# Patient Record
Sex: FEMALE | Race: ASIAN | NOT HISPANIC OR LATINO | Employment: UNEMPLOYED | URBAN - METROPOLITAN AREA
[De-identification: names, ages, dates, MRNs, and addresses within clinical notes are randomized per-mention and may not be internally consistent; named-entity substitution may affect disease eponyms.]

---

## 2017-10-17 ENCOUNTER — OFFICE VISIT (OUTPATIENT)
Dept: PRIMARY CARE CLINIC | Facility: CLINIC | Age: 72
End: 2017-10-17
Payer: MEDICARE

## 2017-10-17 VITALS
RESPIRATION RATE: 18 BRPM | TEMPERATURE: 98 F | SYSTOLIC BLOOD PRESSURE: 119 MMHG | OXYGEN SATURATION: 95 % | DIASTOLIC BLOOD PRESSURE: 69 MMHG | WEIGHT: 120.63 LBS | HEART RATE: 63 BPM | BODY MASS INDEX: 23.68 KG/M2 | HEIGHT: 60 IN

## 2017-10-17 DIAGNOSIS — J30.89 CHRONIC NON-SEASONAL ALLERGIC RHINITIS, UNSPECIFIED TRIGGER: ICD-10-CM

## 2017-10-17 DIAGNOSIS — A04.8 H. PYLORI INFECTION: ICD-10-CM

## 2017-10-17 DIAGNOSIS — R53.83 FATIGUE, UNSPECIFIED TYPE: Primary | ICD-10-CM

## 2017-10-17 DIAGNOSIS — K21.9 GASTROESOPHAGEAL REFLUX DISEASE, ESOPHAGITIS PRESENCE NOT SPECIFIED: ICD-10-CM

## 2017-10-17 DIAGNOSIS — I10 ESSENTIAL HYPERTENSION: ICD-10-CM

## 2017-10-17 DIAGNOSIS — R10.13 ABDOMINAL PAIN, EPIGASTRIC: ICD-10-CM

## 2017-10-17 PROCEDURE — 99999 PR PBB SHADOW E&M-NEW PATIENT-LVL III: CPT | Mod: PBBFAC,,, | Performed by: INTERNAL MEDICINE

## 2017-10-17 PROCEDURE — 96372 THER/PROPH/DIAG INJ SC/IM: CPT | Mod: S$GLB,,, | Performed by: INTERNAL MEDICINE

## 2017-10-17 PROCEDURE — 99213 OFFICE O/P EST LOW 20 MIN: CPT | Mod: 25,S$GLB,, | Performed by: INTERNAL MEDICINE

## 2017-10-17 PROCEDURE — 99499 UNLISTED E&M SERVICE: CPT | Mod: S$GLB,,, | Performed by: INTERNAL MEDICINE

## 2017-10-17 RX ORDER — LEVOCETIRIZINE DIHYDROCHLORIDE 5 MG/1
5 TABLET, FILM COATED ORAL NIGHTLY
Qty: 90 TABLET | Refills: 3 | Status: SHIPPED | OUTPATIENT
Start: 2017-10-17 | End: 2018-10-11 | Stop reason: SDUPTHER

## 2017-10-17 RX ORDER — CYANOCOBALAMIN 1000 UG/ML
2000 INJECTION, SOLUTION INTRAMUSCULAR; SUBCUTANEOUS
Status: COMPLETED | OUTPATIENT
Start: 2017-10-17 | End: 2017-10-17

## 2017-10-17 RX ORDER — DICLOFENAC SODIUM 50 MG/1
50 TABLET, DELAYED RELEASE ORAL 2 TIMES DAILY
Qty: 180 TABLET | Refills: 3 | Status: CANCELLED | OUTPATIENT
Start: 2017-10-17 | End: 2018-01-15

## 2017-10-17 RX ORDER — ESOMEPRAZOLE MAGNESIUM 40 MG/1
40 CAPSULE, DELAYED RELEASE ORAL
Qty: 90 CAPSULE | Refills: 3 | Status: SHIPPED | OUTPATIENT
Start: 2017-10-17 | End: 2018-10-11 | Stop reason: SDUPTHER

## 2017-10-17 RX ORDER — METOPROLOL SUCCINATE 25 MG/1
25 TABLET, EXTENDED RELEASE ORAL DAILY
COMMUNITY
End: 2018-10-11 | Stop reason: SDUPTHER

## 2017-10-17 RX ORDER — ESOMEPRAZOLE MAGNESIUM 40 MG/1
40 CAPSULE, DELAYED RELEASE ORAL
COMMUNITY
End: 2017-10-17 | Stop reason: SDUPTHER

## 2017-10-17 RX ORDER — LEVOCETIRIZINE DIHYDROCHLORIDE 5 MG/1
5 TABLET, FILM COATED ORAL NIGHTLY
COMMUNITY
End: 2017-10-17 | Stop reason: SDUPTHER

## 2017-10-17 RX ORDER — DICLOFENAC SODIUM 50 MG/1
50 TABLET, DELAYED RELEASE ORAL 2 TIMES DAILY
COMMUNITY
End: 2019-05-28 | Stop reason: ALTCHOICE

## 2017-10-17 RX ADMIN — CYANOCOBALAMIN 2000 MCG: 1000 INJECTION, SOLUTION INTRAMUSCULAR; SUBCUTANEOUS at 04:10

## 2017-10-17 NOTE — PROGRESS NOTES
B12 2,000mg IM given left gluteal, tolerated well.  No adverse reaction noted.  Voiced no complaints.

## 2017-10-18 NOTE — PROGRESS NOTES
Subjective:       Patient ID: Louis Bronson is a 72 y.o. female.    Chief Complaint: Annual Exam and Allergies    HPI  Pt was admitted to [facility] for c/o epigastric pain had cardiac w/u with Dr Davenport normal and EGD ops Hpylory as per pt no Mrs from [facility] still with epigastric pain not as bad had po abx and on PPI c/o tired anxiety  Review of Systems    Objective:      Physical Exam   Constitutional: She is oriented to person, place, and time. She appears well-developed and well-nourished. No distress.   HENT:   Head: Normocephalic and atraumatic.   Right Ear: External ear normal.   Left Ear: External ear normal.   Nose: Nose normal.   Mouth/Throat: Oropharynx is clear and moist. No oropharyngeal exudate.   Eyes: Conjunctivae and EOM are normal. Pupils are equal, round, and reactive to light. Right eye exhibits no discharge. Left eye exhibits no discharge.   Neck: Normal range of motion. Neck supple. No thyromegaly present.   Cardiovascular: Normal rate, regular rhythm, normal heart sounds and intact distal pulses.  Exam reveals no gallop and no friction rub.    No murmur heard.  Pulmonary/Chest: Effort normal and breath sounds normal. No respiratory distress. She has no wheezes. She has no rales. She exhibits no tenderness.   Abdominal: Soft. Bowel sounds are normal. She exhibits no distension. There is no tenderness. There is no rebound and no guarding.   Musculoskeletal: Normal range of motion. She exhibits no edema, tenderness or deformity.   Lymphadenopathy:     She has no cervical adenopathy.   Neurological: She is alert and oriented to person, place, and time.   Skin: Skin is warm and dry. Capillary refill takes less than 2 seconds. No rash noted. No erythema.   Psychiatric: She has a normal mood and affect. Judgment and thought content normal.   Nursing note and vitals reviewed.      Assessment:       1. Fatigue, unspecified type    2. Abdominal pain, epigastric    3. Gastroesophageal reflux disease,  esophagitis presence not specified    4. H. pylori infection    5. Chronic non-seasonal allergic rhinitis, unspecified trigger        Plan:       Fatigue, unspecified type  -     cyanocobalamin injection 2,000 mcg; Inject 2 mLs (2,000 mcg total) into the muscle one time.    Abdominal pain, epigastric  Comments:  get MRs from [facility]    Gastroesophageal reflux disease, esophagitis presence not specified  -     esomeprazole (NEXIUM) 40 MG capsule; Take 1 capsule (40 mg total) by mouth before breakfast.  Dispense: 90 capsule; Refill: 3    H. pylori infection    Chronic non-seasonal allergic rhinitis, unspecified trigger  -     levocetirizine (XYZAL) 5 MG tablet; Take 1 tablet (5 mg total) by mouth every evening.  Dispense: 90 tablet; Refill: 3    Other orders  -     Cancel: diclofenac (VOLTAREN) 50 MG EC tablet; Take 1 tablet (50 mg total) by mouth 2 (two) times daily.  Dispense: 180 tablet; Refill: 3

## 2017-10-24 ENCOUNTER — CLINICAL SUPPORT (OUTPATIENT)
Dept: PRIMARY CARE CLINIC | Facility: CLINIC | Age: 72
End: 2017-10-24
Payer: MEDICARE

## 2017-10-24 ENCOUNTER — TELEPHONE (OUTPATIENT)
Dept: PRIMARY CARE CLINIC | Facility: CLINIC | Age: 72
End: 2017-10-24

## 2017-10-24 DIAGNOSIS — Z00.00 ROUTINE GENERAL MEDICAL EXAMINATION AT A HEALTH CARE FACILITY: Primary | ICD-10-CM

## 2017-10-24 DIAGNOSIS — I10 BENIGN HYPERTENSION: ICD-10-CM

## 2017-10-24 DIAGNOSIS — E78.5 HYPERLIPIDEMIA, UNSPECIFIED HYPERLIPIDEMIA TYPE: ICD-10-CM

## 2017-10-24 LAB
ALBUMIN SERPL BCP-MCNC: 3.5 G/DL
ALP SERPL-CCNC: 93 U/L
ALT SERPL W/O P-5'-P-CCNC: 23 U/L
ANION GAP SERPL CALC-SCNC: 7 MMOL/L
AST SERPL-CCNC: 26 U/L
BASOPHILS # BLD AUTO: 0.02 K/UL
BASOPHILS NFR BLD: 0.4 %
BILIRUB SERPL-MCNC: 0.4 MG/DL
BUN SERPL-MCNC: 22 MG/DL
CALCIUM SERPL-MCNC: 9.3 MG/DL
CHLORIDE SERPL-SCNC: 105 MMOL/L
CHOLEST SERPL-MCNC: 190 MG/DL
CHOLEST/HDLC SERPL: 3.3 {RATIO}
CO2 SERPL-SCNC: 28 MMOL/L
CREAT SERPL-MCNC: 0.8 MG/DL
DIFFERENTIAL METHOD: NORMAL
EOSINOPHIL # BLD AUTO: 0.1 K/UL
EOSINOPHIL NFR BLD: 2.3 %
ERYTHROCYTE [DISTWIDTH] IN BLOOD BY AUTOMATED COUNT: 13.9 %
EST. GFR  (AFRICAN AMERICAN): >60 ML/MIN/1.73 M^2
EST. GFR  (NON AFRICAN AMERICAN): >60 ML/MIN/1.73 M^2
GLUCOSE SERPL-MCNC: 95 MG/DL
HCT VFR BLD AUTO: 38.9 %
HDLC SERPL-MCNC: 58 MG/DL
HDLC SERPL: 30.5 %
HGB BLD-MCNC: 12.6 G/DL
IMM GRANULOCYTES # BLD AUTO: 0.01 K/UL
IMM GRANULOCYTES NFR BLD AUTO: 0.2 %
LDLC SERPL CALC-MCNC: 107.6 MG/DL
LYMPHOCYTES # BLD AUTO: 1.9 K/UL
LYMPHOCYTES NFR BLD: 36.3 %
MCH RBC QN AUTO: 30.9 PG
MCHC RBC AUTO-ENTMCNC: 32.4 G/DL
MCV RBC AUTO: 95 FL
MONOCYTES # BLD AUTO: 0.4 K/UL
MONOCYTES NFR BLD: 7.9 %
NEUTROPHILS # BLD AUTO: 2.8 K/UL
NEUTROPHILS NFR BLD: 52.9 %
NONHDLC SERPL-MCNC: 132 MG/DL
NRBC BLD-RTO: 0 /100 WBC
PLATELET # BLD AUTO: 259 K/UL
PMV BLD AUTO: 9.8 FL
POTASSIUM SERPL-SCNC: 4.5 MMOL/L
PROT SERPL-MCNC: 7 G/DL
RBC # BLD AUTO: 4.08 M/UL
SODIUM SERPL-SCNC: 140 MMOL/L
TRIGL SERPL-MCNC: 122 MG/DL
TSH SERPL DL<=0.005 MIU/L-ACNC: 2.19 UIU/ML
WBC # BLD AUTO: 5.2 K/UL

## 2017-10-24 PROCEDURE — 84443 ASSAY THYROID STIM HORMONE: CPT

## 2017-10-24 PROCEDURE — 99999 PR PBB SHADOW E&M-EST. PATIENT-LVL II: CPT | Mod: PBBFAC,,,

## 2017-10-24 PROCEDURE — 85025 COMPLETE CBC W/AUTO DIFF WBC: CPT

## 2017-10-24 PROCEDURE — 80053 COMPREHEN METABOLIC PANEL: CPT

## 2017-10-24 PROCEDURE — 80061 LIPID PANEL: CPT

## 2017-11-28 RX ORDER — INSULIN PUMP SYRINGE, 3 ML
EACH MISCELLANEOUS
Qty: 1 EACH | Refills: 0 | Status: SHIPPED | OUTPATIENT
Start: 2017-11-28 | End: 2019-05-14

## 2017-12-29 ENCOUNTER — TELEPHONE (OUTPATIENT)
Dept: PRIMARY CARE CLINIC | Facility: CLINIC | Age: 72
End: 2017-12-29

## 2017-12-29 NOTE — TELEPHONE ENCOUNTER
----- Message from Bibi King sent at 12/29/2017  1:47 PM CST -----  Contact: Prisca with [facility]   Prisca with [facility] want to speak with a nurse regarding patient test strip machine and lancets. She faxed over papers and needs the diagnostic code and the dr signature. Please call back at [phone]

## 2017-12-29 NOTE — TELEPHONE ENCOUNTER
Spoke with pharmacy  And requested that they re-fax the paper that needs to have  A dx and physician signature. She stated she will fax it again right now . I explained that as soon as I receive it I will give it to Dr. Lerma to do and I will fax it. Back.

## 2017-12-29 NOTE — TELEPHONE ENCOUNTER
----- Message from Bibi King sent at 12/29/2017  1:47 PM CST -----  Contact: Prisca with [facility]   Prisca with [facility] want to speak with a nurse regarding patient test strip machine and lancets. She faxed over papers and needs the diagnostic code and the dr signature. Please call back at [phone]

## 2018-04-24 ENCOUNTER — TELEPHONE (OUTPATIENT)
Dept: OBSTETRICS AND GYNECOLOGY | Facility: CLINIC | Age: 73
End: 2018-04-24

## 2018-04-24 NOTE — TELEPHONE ENCOUNTER
"FYI - New pt - pt's son called to schedule her appt because she is really uncomfortable "down there". pts daughter will be coming to the appt with her to translate.   "

## 2018-04-30 ENCOUNTER — OFFICE VISIT (OUTPATIENT)
Dept: OBSTETRICS AND GYNECOLOGY | Facility: CLINIC | Age: 73
End: 2018-04-30
Attending: STUDENT IN AN ORGANIZED HEALTH CARE EDUCATION/TRAINING PROGRAM
Payer: MEDICARE

## 2018-04-30 VITALS
SYSTOLIC BLOOD PRESSURE: 128 MMHG | BODY MASS INDEX: 23.94 KG/M2 | DIASTOLIC BLOOD PRESSURE: 64 MMHG | WEIGHT: 121.94 LBS | HEIGHT: 60 IN

## 2018-04-30 DIAGNOSIS — Z78.0 POSTMENOPAUSAL: ICD-10-CM

## 2018-04-30 DIAGNOSIS — Z12.4 ENCOUNTER FOR PAPANICOLAOU SMEAR FOR CERVICAL CANCER SCREENING: ICD-10-CM

## 2018-04-30 DIAGNOSIS — Z12.39 BREAST CANCER SCREENING: ICD-10-CM

## 2018-04-30 DIAGNOSIS — Z11.51 ENCOUNTER FOR SCREENING FOR HUMAN PAPILLOMAVIRUS (HPV): ICD-10-CM

## 2018-04-30 DIAGNOSIS — Z01.419 WELL WOMAN EXAM: Primary | ICD-10-CM

## 2018-04-30 DIAGNOSIS — R30.0 DYSURIA: ICD-10-CM

## 2018-04-30 PROCEDURE — 87086 URINE CULTURE/COLONY COUNT: CPT

## 2018-04-30 PROCEDURE — 87624 HPV HI-RISK TYP POOLED RSLT: CPT

## 2018-04-30 PROCEDURE — G0101 CA SCREEN;PELVIC/BREAST EXAM: HCPCS | Mod: S$GLB,,, | Performed by: STUDENT IN AN ORGANIZED HEALTH CARE EDUCATION/TRAINING PROGRAM

## 2018-04-30 PROCEDURE — 87510 GARDNER VAG DNA DIR PROBE: CPT

## 2018-04-30 PROCEDURE — 99999 PR PBB SHADOW E&M-EST. PATIENT-LVL IV: CPT | Mod: PBBFAC,,, | Performed by: STUDENT IN AN ORGANIZED HEALTH CARE EDUCATION/TRAINING PROGRAM

## 2018-04-30 PROCEDURE — 88175 CYTOPATH C/V AUTO FLUID REDO: CPT

## 2018-04-30 PROCEDURE — 87480 CANDIDA DNA DIR PROBE: CPT

## 2018-04-30 RX ORDER — FLUCONAZOLE 150 MG/1
150 TABLET ORAL ONCE
Qty: 2 TABLET | Refills: 1 | Status: SHIPPED | OUTPATIENT
Start: 2018-04-30 | End: 2018-04-30

## 2018-04-30 RX ORDER — NYSTATIN AND TRIAMCINOLONE ACETONIDE 100000; 1 [USP'U]/G; MG/G
CREAM TOPICAL
Qty: 30 G | Refills: 1 | Status: SHIPPED | OUTPATIENT
Start: 2018-04-30 | End: 2019-05-14

## 2018-04-30 NOTE — PROGRESS NOTES
Chief Complaint: Vaginal Irritation     HPI:      Louis Bronson is a 72 y.o.  who presents today for evaluation of vaginal irritation and burning.  She reports her symptoms have improved over the past month.  She reports itching and redness.  She denies any blood in her urine.  Denies any vaginal bleeding or hetmatochezia.  She also reports some clumpy white discharge.  LMP: No LMP recorded (lmp unknown). Patient is postmenopausal.  No other issues, problems, or complaints.  Specifically, patient denies postmenopausal vaginal bleeding, discharge, pelvic pain, urinary problems, or changes in appetite. Ms. Bronson is not currently sexually active.     Previous Pap: No result found   Previous Mammogram: 7 years ago per patient  Most Recent Dexa: 8 years ago per patient  Colonoscopy: 18 Normal    GYN Hx:  Menarche 14.  Menopause 50-50 yo. No postmenopausal bleeding.   OB History      Para Term  AB Living    13 13 13     12    SAB TAB Ectopic Multiple Live Births            12        Past Medical History:   Diagnosis Date    Hyperlipidemia     Hypertension      History reviewed. No pertinent surgical history.  Social History     Social History    Marital status:      Spouse name: N/A    Number of children: N/A    Years of education: N/A     Occupational History    Not on file.     Social History Main Topics    Smoking status: Never Smoker    Smokeless tobacco: Never Used    Alcohol use No    Drug use: No    Sexual activity: Not on file     Other Topics Concern    Not on file     Social History Narrative    No narrative on file     Family History   Problem Relation Age of Onset    Breast cancer Neg Hx     Colon cancer Neg Hx     Ovarian cancer Neg Hx        ROS:     GENERAL: Denies unintentional weight gain or weight loss. Feeling well overall.   SKIN: Denies rash or lesions.   HEENT: Denies headaches, or vision changes.   CARDIOVASCULAR: Denies palpitations or chest  pain.   RESPIRATORY: Denies shortness of breath or dyspnea on exertion.  BREASTS: Denies pain, lumps, or nipple discharge.   ABDOMEN: Denies abdominal pain, constipation, diarrhea, nausea, vomiting, change in appetite.  URINARY: Denies frequency, dysuria, hematuria.  NEUROLOGIC: Denies syncope or weakness.   PSYCHIATRIC: Denies depression, anxiety or mood swings.    Physical Exam:      PHYSICAL EXAM:  /64   Ht 5' (1.524 m)   Wt 55.3 kg (121 lb 14.6 oz)   LMP  (LMP Unknown)   BMI 23.81 kg/m²   Body mass index is 23.81 kg/m².     APPEARANCE: Well nourished, well developed, in no acute distress.  PSYCH: Appropriate mood and affect.  SKIN: No acne or hirsutism.  NECK: Neck symmetric without masses or thyromegaly.  NODES: No inguinal, axillary, or supraclavicular lymph node enlargement.  CHEST: Normal respiratory effort.    CARDIOVASCULAR:  Regular rate and rhythm.  LUNGS:  Clear to auscultation bilaterally.  BREASTS: Symmetrical, no skin changes or visible lesions.  No palpable masses or nipple discharge bilaterally.  ABDOMEN: Soft.  No tenderness or masses.    PELVIC: Normal external genitalia without lesions.  Bilateral labia majora erythematous.  Normal hair distribution.  Adequate perineal body, normal urethral meatus.  Vagina moist and well rugated without lesions or discharge.  Cervix pink, without lesions, discharge or tenderness.  No significant cystocele or rectocele.  Bimanual exam shows uterus to be normal size, regular, mobile and nontender.  Adnexa without masses or tenderness.    EXTREMITIES: No edema.  No tenderness to palpation.    Labs:  Urine dip negative    Assessment/Plan:     72 y.o.     Postmenopausal  -     DXA Bone Density Spine And Hip; Future; Expected date: 2018    Breast cancer screening  -     Mammo Digital Screening Bilat with Tomosynthesis with CAD; Future; Expected date: 2018  -     DXA Bone Density Spine And Hip; Future; Expected date:  2018    Dysuria  -     Urine culture    Other orders  -     fluconazole (DIFLUCAN) 150 MG Tab; Take 1 tablet (150 mg total) by mouth once. Take one tablet. If symptoms persist, repeat dose in 3 days.  Dispense: 2 tablet; Refill: 1  -     nystatin-triamcinolone (MYCOLOG II) cream; Apply to affected area 2 times daily  Dispense: 30 g; Refill: 1          Counselin.  Annual exam performed today without difficulty.  Patient was counseled today on current ASCCP pap guidelines, the recommendation for yearly pelvic exams, healthy diet and exercise routines, annual mammograms.  Pap smear collected and will follow up results.  Dexa ordered.  All questions answered.    2.  Vulvovaginal candidiasis:  Diflucan and mycolog cream given.  Urine culture also sent.  RTC in 2 weeks for follow up.    3.  Follow up with PCP for other health maintenance.  4.  RTC in 2 weeks or sooner if needed.    Use of the [facility] Patient Portal discussed and encouraged during today's visit.

## 2018-05-01 LAB
BACTERIA UR CULT: NORMAL
CANDIDA RRNA VAG QL PROBE: NEGATIVE
G VAGINALIS RRNA GENITAL QL PROBE: NEGATIVE
T VAGINALIS RRNA GENITAL QL PROBE: NEGATIVE

## 2018-05-04 ENCOUNTER — PATIENT MESSAGE (OUTPATIENT)
Dept: OBSTETRICS AND GYNECOLOGY | Facility: CLINIC | Age: 73
End: 2018-05-04

## 2018-05-04 LAB
HPV16 AG SPEC QL: NEGATIVE
HPV16+18+H RISK 12 DNA CVX-IMP: NEGATIVE
HPV18 DNA SPEC QL NAA+PROBE: NEGATIVE

## 2018-05-14 ENCOUNTER — OFFICE VISIT (OUTPATIENT)
Dept: OBSTETRICS AND GYNECOLOGY | Facility: CLINIC | Age: 73
End: 2018-05-14
Attending: STUDENT IN AN ORGANIZED HEALTH CARE EDUCATION/TRAINING PROGRAM
Payer: MEDICARE

## 2018-05-14 VITALS
WEIGHT: 121.06 LBS | DIASTOLIC BLOOD PRESSURE: 72 MMHG | BODY MASS INDEX: 23.77 KG/M2 | SYSTOLIC BLOOD PRESSURE: 128 MMHG | HEIGHT: 60 IN

## 2018-05-14 DIAGNOSIS — N95.2 VAGINAL ATROPHY: Primary | ICD-10-CM

## 2018-05-14 DIAGNOSIS — N39.42 URINARY INCONTINENCE WITHOUT SENSORY AWARENESS: ICD-10-CM

## 2018-05-14 LAB
BILIRUB UR QL STRIP: NEGATIVE
CLARITY UR REFRACT.AUTO: CLEAR
COLOR UR AUTO: COLORLESS
GLUCOSE UR QL STRIP: NEGATIVE
HGB UR QL STRIP: NEGATIVE
KETONES UR QL STRIP: NEGATIVE
LEUKOCYTE ESTERASE UR QL STRIP: NEGATIVE
NITRITE UR QL STRIP: NEGATIVE
PH UR STRIP: 7 [PH] (ref 5–8)
PROT UR QL STRIP: NEGATIVE
SP GR UR STRIP: 1 (ref 1–1.03)
URN SPEC COLLECT METH UR: ABNORMAL
UROBILINOGEN UR STRIP-ACNC: NEGATIVE EU/DL

## 2018-05-14 PROCEDURE — 99213 OFFICE O/P EST LOW 20 MIN: CPT | Mod: S$GLB,,, | Performed by: STUDENT IN AN ORGANIZED HEALTH CARE EDUCATION/TRAINING PROGRAM

## 2018-05-14 PROCEDURE — 3078F DIAST BP <80 MM HG: CPT | Mod: CPTII,S$GLB,, | Performed by: STUDENT IN AN ORGANIZED HEALTH CARE EDUCATION/TRAINING PROGRAM

## 2018-05-14 PROCEDURE — 87086 URINE CULTURE/COLONY COUNT: CPT

## 2018-05-14 PROCEDURE — 3074F SYST BP LT 130 MM HG: CPT | Mod: CPTII,S$GLB,, | Performed by: STUDENT IN AN ORGANIZED HEALTH CARE EDUCATION/TRAINING PROGRAM

## 2018-05-14 PROCEDURE — 99999 PR PBB SHADOW E&M-EST. PATIENT-LVL III: CPT | Mod: PBBFAC,,, | Performed by: STUDENT IN AN ORGANIZED HEALTH CARE EDUCATION/TRAINING PROGRAM

## 2018-05-14 PROCEDURE — 81003 URINALYSIS AUTO W/O SCOPE: CPT

## 2018-05-14 RX ORDER — ESTRADIOL 0.1 MG/G
CREAM VAGINAL
Qty: 42.5 G | Refills: 1 | Status: SHIPPED | OUTPATIENT
Start: 2018-05-14 | End: 2018-12-17 | Stop reason: SDUPTHER

## 2018-05-14 NOTE — PROGRESS NOTES
Chief Complaint: Vaginal atrophy follow up     HPI:      Louis Bronson is a 73 y.o.  who presents today for follow up.  Patient reports that her symptoms improved with the mycolog and diflucan.  She does report that over the weekend she traveled to [place] for a  and started to notice the symptoms again.  She denies any other issues or concern.  No vaginal bleeding, no hematuria.      OB History      Para Term  AB Living    13 13 13     12    SAB TAB Ectopic Multiple Live Births            12        Past Medical History:   Diagnosis Date    Hyperlipidemia     Hypertension      History reviewed. No pertinent surgical history.  Social History     Social History    Marital status:      Spouse name: N/A    Number of children: N/A    Years of education: N/A     Occupational History    Not on file.     Social History Main Topics    Smoking status: Never Smoker    Smokeless tobacco: Never Used    Alcohol use No    Drug use: No    Sexual activity: Not on file     Other Topics Concern    Not on file     Social History Narrative    No narrative on file     Family History   Problem Relation Age of Onset    Breast cancer Neg Hx     Colon cancer Neg Hx     Ovarian cancer Neg Hx        ROS:     GENERAL: Denies unintentional weight gain or weight loss. Feeling well overall.   SKIN: Denies rash or lesions.   HEENT: Denies headaches, or vision changes.   CARDIOVASCULAR: Denies palpitations or chest pain.   RESPIRATORY: Denies shortness of breath or dyspnea on exertion.  BREASTS: Denies pain, lumps, or nipple discharge.   ABDOMEN: Denies abdominal pain, constipation, diarrhea, nausea, vomiting, change in appetite.  URINARY: Denies frequency, dysuria, hematuria.  NEUROLOGIC: Denies syncope or weakness.   PSYCHIATRIC: Denies depression, anxiety or mood swings.    Physical Exam:      PHYSICAL EXAM:  /72   Ht 5' (1.524 m)   Wt 54.9 kg (121 lb 0.5 oz)   LMP  (LMP Unknown)    BMI 23.64 kg/m²   Body mass index is 23.64 kg/m².     APPEARANCE: Well nourished, well developed, in no acute distress.  PSYCH: Appropriate mood and affect.  SKIN: No acne or hirsutism.  ABDOMEN: Soft.  No tenderness or masses.    PELVIC: Normal external genitalia without lesions.  Normal hair distribution.  Adequate perineal body.  Vagina atrophic and urethral meatus inflamed and erythematous. No vaginal discharge present.  Adnexa without masses or tenderness.    EXTREMITIES: No edema.  No tenderness to palpation.    Labs:  Urine dip negative    Assessment/Plan:     73 y.o.     Vaginal atrophy  -     Ambulatory Referral to Urogynecology  -     Urine culture  -     Urinalysis    Urinary incontinence without sensory awareness   -     Urine culture    Other orders  -     estradiol (ESTRACE) 0.01 % (0.1 mg/gram) vaginal cream; Apply a dime sized about nightly x 2 weeks, then 3 times per week x 2 weeks, then twice weekly x 2 weeks, then weekly.  Dispense: 42.5 g; Refill: 1          Counselin.  Vaginal atrophy:  Will start vaginal estrogen.  R/B/A of medication reviewed with patient and daughter.  Instructed on use.  All questions answered.  RTC in 6 weeks for follow up.  2.  Dysuria:  Urine culture again collected.  Inflammation present around urethral meatus.  Will follow up results and refer to Urogyn.  All questions answered.  3.  Follow up with PCP for other health maintenance.  4.  RTC in 6 weeks or sooner if needed.    Use of the [facility] Patient Portal discussed and encouraged during today's visit.

## 2018-05-15 ENCOUNTER — APPOINTMENT (OUTPATIENT)
Dept: RADIOLOGY | Facility: CLINIC | Age: 73
End: 2018-05-15
Attending: STUDENT IN AN ORGANIZED HEALTH CARE EDUCATION/TRAINING PROGRAM
Payer: MEDICARE

## 2018-05-15 DIAGNOSIS — Z12.39 BREAST CANCER SCREENING: ICD-10-CM

## 2018-05-15 DIAGNOSIS — Z78.0 POSTMENOPAUSAL: ICD-10-CM

## 2018-05-15 LAB — BACTERIA UR CULT: NO GROWTH

## 2018-05-15 PROCEDURE — 77080 DXA BONE DENSITY AXIAL: CPT | Mod: TC,PO

## 2018-05-15 PROCEDURE — 77080 DXA BONE DENSITY AXIAL: CPT | Mod: 26,,, | Performed by: INTERNAL MEDICINE

## 2018-05-23 ENCOUNTER — PATIENT MESSAGE (OUTPATIENT)
Dept: OBSTETRICS AND GYNECOLOGY | Facility: CLINIC | Age: 73
End: 2018-05-23

## 2018-05-23 NOTE — TELEPHONE ENCOUNTER
Phone call made to patient regarding bone density results.  No answer.  Voicemail left to return MD phone call.

## 2018-06-25 ENCOUNTER — OFFICE VISIT (OUTPATIENT)
Dept: OBSTETRICS AND GYNECOLOGY | Facility: CLINIC | Age: 73
End: 2018-06-25
Attending: STUDENT IN AN ORGANIZED HEALTH CARE EDUCATION/TRAINING PROGRAM
Payer: MEDICARE

## 2018-06-25 VITALS
BODY MASS INDEX: 23.74 KG/M2 | WEIGHT: 120.94 LBS | SYSTOLIC BLOOD PRESSURE: 126 MMHG | DIASTOLIC BLOOD PRESSURE: 80 MMHG | HEIGHT: 60 IN

## 2018-06-25 DIAGNOSIS — N95.2 VAGINAL ATROPHY: Primary | ICD-10-CM

## 2018-06-25 PROCEDURE — 3074F SYST BP LT 130 MM HG: CPT | Mod: CPTII,S$GLB,, | Performed by: STUDENT IN AN ORGANIZED HEALTH CARE EDUCATION/TRAINING PROGRAM

## 2018-06-25 PROCEDURE — 99999 PR PBB SHADOW E&M-EST. PATIENT-LVL III: CPT | Mod: PBBFAC,,, | Performed by: STUDENT IN AN ORGANIZED HEALTH CARE EDUCATION/TRAINING PROGRAM

## 2018-06-25 PROCEDURE — 3079F DIAST BP 80-89 MM HG: CPT | Mod: CPTII,S$GLB,, | Performed by: STUDENT IN AN ORGANIZED HEALTH CARE EDUCATION/TRAINING PROGRAM

## 2018-06-25 PROCEDURE — 99213 OFFICE O/P EST LOW 20 MIN: CPT | Mod: S$GLB,,, | Performed by: STUDENT IN AN ORGANIZED HEALTH CARE EDUCATION/TRAINING PROGRAM

## 2018-06-25 NOTE — PROGRESS NOTES
Chief Complaint: Vaginal atrophy     HPI:      Louis Bronson is a 73 y.o.  who presents today for follow up of vaginal atrophy and irritation.  Reports improvement with mycolog cream and estrogen cream.  Denies any issues or concerns.  No vaginal discharge or bleeding.  Reoprts she is now using estradiol cream once weekly.      OB History      Para Term  AB Living    13 13 13     12    SAB TAB Ectopic Multiple Live Births            12        Past Medical History:   Diagnosis Date    Hyperlipidemia     Hypertension      History reviewed. No pertinent surgical history.  Social History     Social History    Marital status:      Spouse name: N/A    Number of children: N/A    Years of education: N/A     Occupational History    Not on file.     Social History Main Topics    Smoking status: Never Smoker    Smokeless tobacco: Never Used    Alcohol use No    Drug use: No    Sexual activity: Not on file     Other Topics Concern    Not on file     Social History Narrative    No narrative on file     Family History   Problem Relation Age of Onset    Breast cancer Neg Hx     Colon cancer Neg Hx     Ovarian cancer Neg Hx        ROS:     GENERAL: Denies unintentional weight gain or weight loss. Feeling well overall.   SKIN: Denies rash or lesions.   HEENT: Denies headaches, or vision changes.   ABDOMEN: Denies abdominal pain, constipation, diarrhea, nausea, vomiting, change in appetite.  URINARY: Denies frequency, dysuria, hematuria.  NEUROLOGIC: Denies syncope or weakness.   PSYCHIATRIC: Denies depression, anxiety or mood swings.    Physical Exam:      PHYSICAL EXAM:  /80   Ht 5' (1.524 m)   Wt 54.9 kg (120 lb 14.8 oz)   LMP  (LMP Unknown)   BMI 23.62 kg/m²   Body mass index is 23.62 kg/m².     APPEARANCE: Well nourished, well developed, in no acute distress.  PSYCH: Appropriate mood and affect.  SKIN: No acne or hirsutism.  ABDOMEN: Soft.  No tenderness or masses.     PELVIC: Normal external genitalia without lesions.  Normal hair distribution.  Adequate perineal body, normal urethral meatus - remains inflamed and erythematous although some improvement.  Atrophy still present but improved.  Cervix pink, without lesions, discharge or tenderness.  No significant cystocele or rectocele.  Bimanual exam shows uterus to be normal size, regular, mobile and nontender.  Adnexa without masses or tenderness.    EXTREMITIES: No edema.  No tenderness to palpation.    Assessment/Plan:     73 y.o.     Vaginal atrophy          Counselin.  Vaginal atrophy:  Improved with estradiol - will continue current treatment.  Some continued erythema and irritation around urethral meatus.  Will send to Urogynecology for further evaluation.  Discussed with patient and daughter and all questions answered.      Use of the [facility] Patient Portal discussed and encouraged during today's visit.

## 2018-07-17 ENCOUNTER — INITIAL CONSULT (OUTPATIENT)
Dept: UROGYNECOLOGY | Facility: CLINIC | Age: 73
End: 2018-07-17
Attending: STUDENT IN AN ORGANIZED HEALTH CARE EDUCATION/TRAINING PROGRAM
Payer: MEDICARE

## 2018-07-17 VITALS
SYSTOLIC BLOOD PRESSURE: 92 MMHG | BODY MASS INDEX: 23.67 KG/M2 | WEIGHT: 120.56 LBS | HEIGHT: 60 IN | DIASTOLIC BLOOD PRESSURE: 60 MMHG

## 2018-07-17 DIAGNOSIS — K59.01 SLOW TRANSIT CONSTIPATION: ICD-10-CM

## 2018-07-17 DIAGNOSIS — R35.0 URINARY FREQUENCY: ICD-10-CM

## 2018-07-17 DIAGNOSIS — N95.2 VAGINAL ATROPHY: ICD-10-CM

## 2018-07-17 DIAGNOSIS — N36.2 URETHRAL CARUNCLE: Primary | ICD-10-CM

## 2018-07-17 PROCEDURE — 87086 URINE CULTURE/COLONY COUNT: CPT

## 2018-07-17 PROCEDURE — 3074F SYST BP LT 130 MM HG: CPT | Mod: CPTII,S$GLB,, | Performed by: OBSTETRICS & GYNECOLOGY

## 2018-07-17 PROCEDURE — 99214 OFFICE O/P EST MOD 30 MIN: CPT | Mod: S$GLB,,, | Performed by: OBSTETRICS & GYNECOLOGY

## 2018-07-17 PROCEDURE — 99999 PR PBB SHADOW E&M-EST. PATIENT-LVL III: CPT | Mod: PBBFAC,,, | Performed by: OBSTETRICS & GYNECOLOGY

## 2018-07-17 PROCEDURE — 3078F DIAST BP <80 MM HG: CPT | Mod: CPTII,S$GLB,, | Performed by: OBSTETRICS & GYNECOLOGY

## 2018-07-17 RX ORDER — DOCUSATE SODIUM 100 MG/1
100 CAPSULE, LIQUID FILLED ORAL 2 TIMES DAILY
Qty: 30 CAPSULE | Refills: 11 | Status: SHIPPED | OUTPATIENT
Start: 2018-07-17 | End: 2018-08-16

## 2018-07-17 NOTE — LETTER
July 17, 2018      Ngoc Irene MD  [address]  [address]  [facility] [address]           [facility] [address] [place] [address]  Phone: [phone]          Patient: Louis Bronson   MR Number: [id number]   YOB: 1945   Date of Visit: 7/17/2018       Dear Dr. Ngoc Irene:    Thank you for referring Louis Bronson to me for evaluation. Attached you will find relevant portions of my assessment and plan of care.    If you have questions, please do not hesitate to call me. I look forward to following Louis Bronson along with you.    Sincerely,    Batsheva Barragan,     Enclosure  CC:  No Recipients    If you would like to receive this communication electronically, please contact [email or web address] or [phone] to request more information on [facility] [employer] access.    For providers and/or their staff who would like to refer a patient to [facility], please contact us through our one-stop-shop provider referral line, [facility] , at [phone].    If you feel you have received this communication in error or would no longer like to receive these types of communications, please e-mail [email or web address]

## 2018-07-17 NOTE — PATIENT INSTRUCTIONS
1 Vaginal atrophy (dryness):  Urethral caruncle: Use 1 gram of estrogen cream in vagina nightly x 2 weeks, then twice a week thereafter.      2.  Osteopenia  Recommend evaluation with endocrine     3. Constipation:  Controlling constipation may help bladder urgency/leakage and fiber may better control cholesterol and blood glucose.  Start daily fiber.  Take 1 tsp of fiber powder (psyllium or other sugar-free powder).  Mix in 8 oz of water.  Take x 3-5 days.  Then, increase fiber by 1 tsp every 3-5 days until stool is easy to pass.  Stop and continue at that dose.   Do not exceed 6 tsps/day.  Start stool  Daily

## 2018-07-17 NOTE — PROGRESS NOTES
Subjective:       Patient ID: Louis Bronson is a 73 y.o. female.    Chief Complaint:  vaginal atrophy and vaginal lesion      History of Present Illness  HPI 73 Y O F with  has a past medical history of Hyperlipidemia and Hypertension. referred by Dr. Coates for evaluation of vaginal atrophy and a ? Vaginal lesion noted on exam.     [facility]     Question 7/17/2018 11:25 AM CDT   General Urogynecology: Are you experiencing the following?    Dysuria (painful urination) No   Nocturia:  waking up at night to empty your bladder  Yes   If you answered yes to the previous question, how many times does this happen per night? 1-2   Enuresis (urine loss during sleep) No   Dribbling urine after you urinate No   Hematuria (urine appears red) No   Type of stream Strong   Urinary frequency: How often a day are you going to the bathroom per day?  Less than 10   Urinary Tract Infections: How many Urinary Tract Infections have you had in the past year? One (1) in the past year   If you have had a UTI in the past year, what treatments have you had so far?  Other   Urinary Incontinence (General): Are you experiencing the following?    Past consultation for incontinence: Have you ever seen someone for the evaluation of incontinence? No   If you answered yes to the previous question, please select all the therapies you have tried.  None of the above   Please note the effectiveness of the therapies.    Need to wear protection to keep clothes dry  No   If you answered yes to the previous question, please emiliano the protection you use.  None   If you wear protection, how much wetness is typically on each pad? N/a- I do not wear protection   If you wear protection, how often do you have to change per day, if applicable?  0   Stress Symptoms: Are you experiencing the following?    Leakage of urine with cough, laugh and/or sneeze No   If you answered yes to the previous question, what is the frequency in days, weeks and/or months?  "Never   Leakage of urine with sex No   Leakage of urine with bending/ lifting No   Leakage of urine with briskly walking or jogging No   If you lose urine for any other reason not previously mentioned, please note it below, if applicable.     Urge Symptoms: Are you experiencing the following?    Urgency ("got to go" feeling) No   Urge: How frequently do you feel an urge to urinate (feeling like you "gotta go" to the bathroom and can't wait) Monthly   Do you experience a leakage of urine when you have a feeling of urgency?  No   Leakage of urine when unaware No   Past use of anticholinergics (medications used to treat overactive bladder) No   If you answered yes to the previous question, please emiliano the anticholinergics you have used:     Have you ever used Mirbetriq (aka Mirabegron)?  No   Prolapse Symptoms: Are you experiencing any of the following?     Falling out/ Bulging/ Heaviness in the vagina No   Vaginal/ Abdominal Pain/ Pressure No   Need to strain/ Push to void No   Need to wait on the toilet before you void No   Unusual position to urinate (using your hands to push back the vaginal bulge) No   Sensation of incomplete emptying No   Past use of pessary device No   If you answered yes to the previous question, please list the devices you have used below.     Bowel Symptoms: Are you experiencing any of the following?    Constipation Yes   Diarrhea  No   Hematochezia (bloody stool) No   Incomplete evacuation of stool No   Involuntary loss of formed stool No   Fecal smearing/urgency No   Involuntary loss of gas No   Vaginal Symptoms: Are you experiencing any of the following?     Abnormal vaginal bleeding  No   Vaginal dryness Yes   Sexually active  Yes   Dyspareunia (painful intercourse) No   Estrogen use  Yes       GYN & OB History  No LMP recorded (lmp unknown). Patient is postmenopausal.   Date of Last Pap: 5/3/2018    OB History    Para Term  AB Living   14 13 13     12   SAB TAB Ectopic " Multiple Live Births           13      # Outcome Date GA Lbr Edvin/2nd Weight Sex Delivery Anes PTL Lv   14             13 Term     M Vag-Spont   RAFAEL   12 Term     F Vag-Spont   RAFAEL   11 Term     F Vag-Spont   RAFAEL   10 Term     M Vag-Spont   RAFAEL   9 Term     M Vag-Spont   RAFAEL   8 Term     M Vag-Spont   RAFAEL   7 Term     F Vag-Spont   RAFAEL   6 Term     M Vag-Spont   RAFAEL   5 Term     M Vag-Spont   RAFAEL   4 Term     M Vag-Spont   RAFAEL   3 Term     M Vag-Spont   RAFAEL   2 Term     M Vag-Spont      1 Term     M Vag-Spont   RAFAEL        OB  Largest:  6#  Forceps: No   Episiotomy:  Yes    GYN  Menarche: 13  Menstrual cycle: n/a  Menopause: 50's  Hysterectomy: no   Ovaries: present       Review of Systems  Review of Systems   Constitutional: Negative for activity change, appetite change, chills, diaphoresis, fatigue, fever and unexpected weight change.   Respiratory: Negative for cough.    Gastrointestinal: Negative for abdominal distention, abdominal pain, anal bleeding, blood in stool, constipation, diarrhea, nausea, rectal pain and vomiting.   Genitourinary: Negative for decreased urine volume, difficulty urinating, dyspareunia, dysuria, enuresis, flank pain, frequency, genital sores, hematuria, menstrual problem, pelvic pain, urgency, vaginal bleeding, vaginal discharge and vaginal pain.   Musculoskeletal: Negative for back pain.   Skin: Negative for color change, pallor, rash and wound.   Allergic/Immunologic: Negative for environmental allergies, food allergies and immunocompromised state.   Hematological: Negative for adenopathy. Does not bruise/bleed easily.   Psychiatric/Behavioral: Negative for agitation, behavioral problems, confusion and sleep disturbance.           Objective:     Physical Exam   Constitutional: She is oriented to person, place, and time. She appears well-developed.   HENT:   Head: Normocephalic and atraumatic.   Eyes: Conjunctivae and EOM are normal.   Neck: Normal range of motion. Neck supple.    Cardiovascular: Normal rate, regular rhythm, S1 normal, S2 normal, normal heart sounds and intact distal pulses.    Pulmonary/Chest: Effort normal and breath sounds normal. She exhibits no tenderness.   Abdominal: Soft. Bowel sounds are normal. She exhibits no distension and no mass. There is no hepatosplenomegaly, splenomegaly or hepatomegaly. There is no tenderness. There is no rigidity, no rebound, no guarding and no CVA tenderness. No hernia.   Genitourinary: Pelvic exam was performed with patient supine. Rectum normal, vagina normal, uterus normal, cervix normal, skenes normal and bartholins normal. Right labia normal and left labia normal. Urethra exhibits urethral caruncle and hypermobility. Urethra exhibits no urethral diverticulum and no urethral mass. Right bartholin is not enlarged and not tender. Left bartholin is not enlarged and not tender. Rectal exam shows resting tone normal and active tone normal. Rectal exam shows no external hemorrhoid, no fissure, no tenderness, anal tone normal and no dovetailing. Guaiac negative stool. There is atrophy in the vagina. No foreign body, tenderness, bleeding, unspecified prolapse of vaginal walls, fistula, mesh exposure or lavator tenderness in the vagina. No vaginal discharge found. Right adnexum displays no mass and no tenderness. Left adnexum displays no mass and no tenderness. Cervix exhibits no motion tenderness and no discharge. Uterus is not tender and not experiencing uterine prolapse.   PVR: 80 ML  Empty cough stress test: Positive.  Kegel: 2/5    POP-Q  Aa: -1 Ba: -1 C: -5   GH: 3 PB: 2 TVL: 9   Ap: -1 Bp: -1 D: -6                     Musculoskeletal: Normal range of motion.   Lymphadenopathy:     She has no axillary adenopathy.        Right: No inguinal adenopathy present.        Left: No inguinal adenopathy present.   Neurological: She is alert and oriented to person, place, and time. She has normal strength and normal reflexes. Cranial nerves II  through XII intact. No cranial nerve deficit.   Skin: Skin is warm, dry and intact.   Psychiatric: She has a normal mood and affect. Her speech is normal and behavior is normal. Judgment normal. Cognition and memory are normal.           HCM  Pap's: ap's normal   Mammo: Normal 2018  Colonoscopy: Normal 2018  Dexa: Osteopenia 2018        Assessment:        1. Urethral caruncle    2. Slow transit constipation    3. Vaginal atrophy    4. Urinary frequency            Plan:      1 Vaginal atrophy (dryness):  Urethral caruncle: Use 1 gram of estrogen cream in vagina nightly x 2 weeks, then twice a week thereafter.      2.  Osteopenia  Recommend evaluation with endocrine     3. Constipation:  Controlling constipation may help bladder urgency/leakage and fiber may better control cholesterol and blood glucose.  Start daily fiber.  Take 1 tsp of fiber powder (psyllium or other sugar-free powder).  Mix in 8 oz of water.  Take x 3-5 days.  Then, increase fiber by 1 tsp every 3-5 days until stool is easy to pass.  Stop and continue at that dose.   Do not exceed 6 tsps/day.  Start stool  Daily colace.      Approximately  40 min were spent in consult, 90 % in discussion.     Thank you for requesting consultation of your patient.  I look forward to participating in her care.    Batsheva Barragan DO  Female Pelvic Medicine and Reconstructive Surgery  [facility] [place], [place]

## 2018-07-18 LAB — BACTERIA UR CULT: NO GROWTH

## 2018-07-21 ENCOUNTER — PATIENT MESSAGE (OUTPATIENT)
Dept: UROGYNECOLOGY | Facility: CLINIC | Age: 73
End: 2018-07-21

## 2018-10-11 ENCOUNTER — OFFICE VISIT (OUTPATIENT)
Dept: PRIMARY CARE CLINIC | Facility: CLINIC | Age: 73
End: 2018-10-11
Payer: MEDICARE

## 2018-10-11 VITALS
OXYGEN SATURATION: 98 % | SYSTOLIC BLOOD PRESSURE: 142 MMHG | HEIGHT: 60 IN | BODY MASS INDEX: 23.07 KG/M2 | TEMPERATURE: 98 F | DIASTOLIC BLOOD PRESSURE: 86 MMHG | WEIGHT: 117.5 LBS | HEART RATE: 72 BPM | RESPIRATION RATE: 18 BRPM

## 2018-10-11 DIAGNOSIS — J30.2 SEASONAL ALLERGIC RHINITIS, UNSPECIFIED TRIGGER: ICD-10-CM

## 2018-10-11 DIAGNOSIS — J30.89 CHRONIC NON-SEASONAL ALLERGIC RHINITIS: ICD-10-CM

## 2018-10-11 DIAGNOSIS — R73.9 HYPERGLYCEMIA: ICD-10-CM

## 2018-10-11 DIAGNOSIS — K29.70 GASTRITIS, PRESENCE OF BLEEDING UNSPECIFIED, UNSPECIFIED CHRONICITY, UNSPECIFIED GASTRITIS TYPE: ICD-10-CM

## 2018-10-11 DIAGNOSIS — I10 BENIGN HYPERTENSION: ICD-10-CM

## 2018-10-11 DIAGNOSIS — E78.5 HYPERLIPIDEMIA, UNSPECIFIED HYPERLIPIDEMIA TYPE: ICD-10-CM

## 2018-10-11 DIAGNOSIS — R10.13 ABDOMINAL PAIN, EPIGASTRIC: ICD-10-CM

## 2018-10-11 DIAGNOSIS — M79.10 MYALGIA: ICD-10-CM

## 2018-10-11 DIAGNOSIS — R53.83 FATIGUE, UNSPECIFIED TYPE: Primary | ICD-10-CM

## 2018-10-11 DIAGNOSIS — K21.9 GASTROESOPHAGEAL REFLUX DISEASE, ESOPHAGITIS PRESENCE NOT SPECIFIED: ICD-10-CM

## 2018-10-11 DIAGNOSIS — H10.13 ALLERGIC CONJUNCTIVITIS OF BOTH EYES: ICD-10-CM

## 2018-10-11 PROCEDURE — 99213 OFFICE O/P EST LOW 20 MIN: CPT | Mod: S$PBB,,, | Performed by: INTERNAL MEDICINE

## 2018-10-11 PROCEDURE — 1101F PT FALLS ASSESS-DOCD LE1/YR: CPT | Mod: CPTII,,, | Performed by: INTERNAL MEDICINE

## 2018-10-11 PROCEDURE — 99999 PR PBB SHADOW E&M-EST. PATIENT-LVL III: CPT | Mod: PBBFAC,,, | Performed by: INTERNAL MEDICINE

## 2018-10-11 PROCEDURE — 96372 THER/PROPH/DIAG INJ SC/IM: CPT | Mod: PBBFAC,PN

## 2018-10-11 PROCEDURE — 3077F SYST BP >= 140 MM HG: CPT | Mod: CPTII,,, | Performed by: INTERNAL MEDICINE

## 2018-10-11 PROCEDURE — 99499 UNLISTED E&M SERVICE: CPT | Mod: S$GLB,,, | Performed by: INTERNAL MEDICINE

## 2018-10-11 PROCEDURE — 3079F DIAST BP 80-89 MM HG: CPT | Mod: CPTII,,, | Performed by: INTERNAL MEDICINE

## 2018-10-11 PROCEDURE — 99213 OFFICE O/P EST LOW 20 MIN: CPT | Mod: PBBFAC,PN | Performed by: INTERNAL MEDICINE

## 2018-10-11 RX ORDER — LEVOCETIRIZINE DIHYDROCHLORIDE 5 MG/1
5 TABLET, FILM COATED ORAL NIGHTLY
Qty: 30 TABLET | Refills: 3 | Status: SHIPPED | OUTPATIENT
Start: 2018-10-11 | End: 2019-11-08 | Stop reason: SDUPTHER

## 2018-10-11 RX ORDER — ATORVASTATIN CALCIUM 20 MG/1
20 TABLET, FILM COATED ORAL DAILY
Qty: 90 TABLET | Refills: 3 | Status: SHIPPED | OUTPATIENT
Start: 2018-10-11 | End: 2019-11-08 | Stop reason: SDUPTHER

## 2018-10-11 RX ORDER — BETAMETHASONE SODIUM PHOSPHATE AND BETAMETHASONE ACETATE 3; 3 MG/ML; MG/ML
6 INJECTION, SUSPENSION INTRA-ARTICULAR; INTRALESIONAL; INTRAMUSCULAR; SOFT TISSUE
Status: COMPLETED | OUTPATIENT
Start: 2018-10-11 | End: 2018-10-11

## 2018-10-11 RX ORDER — ESOMEPRAZOLE MAGNESIUM 40 MG/1
40 CAPSULE, DELAYED RELEASE ORAL
Qty: 90 CAPSULE | Refills: 3 | Status: SHIPPED | OUTPATIENT
Start: 2018-10-11 | End: 2019-11-08 | Stop reason: SDUPTHER

## 2018-10-11 RX ORDER — CYANOCOBALAMIN 1000 UG/ML
1000 INJECTION, SOLUTION INTRAMUSCULAR; SUBCUTANEOUS
Status: COMPLETED | OUTPATIENT
Start: 2018-10-11 | End: 2018-10-11

## 2018-10-11 RX ORDER — MONTELUKAST SODIUM 10 MG/1
10 TABLET ORAL NIGHTLY
Qty: 30 TABLET | Refills: 5 | Status: SHIPPED | OUTPATIENT
Start: 2018-10-11 | End: 2018-11-10

## 2018-10-11 RX ORDER — METOPROLOL SUCCINATE 25 MG/1
25 TABLET, EXTENDED RELEASE ORAL DAILY
Qty: 90 TABLET | Refills: 3 | Status: SHIPPED | OUTPATIENT
Start: 2018-10-11 | End: 2019-05-14 | Stop reason: SDUPTHER

## 2018-10-11 RX ADMIN — CYANOCOBALAMIN 1000 MCG: 1000 INJECTION INTRAMUSCULAR; SUBCUTANEOUS at 12:10

## 2018-10-11 RX ADMIN — BETAMETHASONE SODIUM PHOSPHATE AND BETAMETHASONE ACETATE 6 MG: 3; 3 INJECTION, SUSPENSION INTRA-ARTICULAR; INTRALESIONAL; INTRAMUSCULAR at 12:10

## 2018-10-11 NOTE — PROGRESS NOTES
Patient ID by name and . NKDA. Celestone 12 mg IM injection given with B12 1000 mcg in left ventrogluteal using aseptic technique. Aspirated with no blood noted. Patient tolerated well. Given per physicians order. No adverse reactions noted.

## 2018-10-12 NOTE — PROGRESS NOTES
Subjective:       Patient ID: Louis Bronson is a 73 y.o. female.    Chief Complaint: Follow-up    HPI  patient was recently hospitalized for treatment of ulcer in the stomach had EGD and colonoscopy was normal EGD and gastritis no ulcer patients that was working outside a lot in the heat gets sick brought to the hospital by son also had cardiac workup that was negative and had her flu shot and pneumococcal vaccine in the hospital before discharge patient here for follow-up her main complaint now is some headaches and tired passed away in her lower extremities and some diffuse joint pain no fever no short of breath no skin rash no nausea vomiting or diarrhea  Review of Systems    Objective:      Physical Exam   Constitutional: She is oriented to person, place, and time. She appears well-developed and well-nourished. No distress.   HENT:   Head: Normocephalic and atraumatic.   Right Ear: External ear normal.   Left Ear: External ear normal.   Nose: Nose normal.   Mouth/Throat: Oropharynx is clear and moist. No oropharyngeal exudate.   Eyes: Conjunctivae and EOM are normal. Pupils are equal, round, and reactive to light. Right eye exhibits no discharge. Left eye exhibits no discharge.   Neck: Normal range of motion. Neck supple. No thyromegaly present.   Cardiovascular: Normal rate, regular rhythm, normal heart sounds and intact distal pulses. Exam reveals no gallop and no friction rub.   No murmur heard.  Pulmonary/Chest: Effort normal and breath sounds normal. No respiratory distress. She has no wheezes. She has no rales. She exhibits no tenderness.   Abdominal: Soft. Bowel sounds are normal. She exhibits no distension. There is no tenderness. There is no rebound and no guarding.   Musculoskeletal: Normal range of motion. She exhibits no edema, tenderness or deformity.   Lymphadenopathy:     She has no cervical adenopathy.   Neurological: She is alert and oriented to person, place, and time.   Skin: Skin is warm and  dry. Capillary refill takes less than 2 seconds. No rash noted. No erythema.   Psychiatric: She has a normal mood and affect. Judgment and thought content normal.   Nursing note and vitals reviewed.      Assessment:       1. Fatigue, unspecified type    2. Myalgia    3. Gastritis, presence of bleeding unspecified, unspecified chronicity, unspecified gastritis type    4. Hyperlipidemia, unspecified hyperlipidemia type    5. Benign hypertension    6. Abdominal pain, epigastric    7. Gastroesophageal reflux disease, esophagitis presence not specified    8. Chronic non-seasonal allergic rhinitis    9. Seasonal allergic rhinitis, unspecified trigger    10. Allergic conjunctivitis of both eyes    11. Hyperglycemia        Plan:       Fatigue, unspecified type  -     cyanocobalamin injection 1,000 mcg; Inject 1 mL (1,000 mcg total) into the muscle one time.    Myalgia  -     betamethasone acetate-betamethasone sodium phosphate injection 6 mg; Inject 1 mL (6 mg total) into the muscle one time.    Gastritis, presence of bleeding unspecified, unspecified chronicity, unspecified gastritis type    Hyperlipidemia, unspecified hyperlipidemia type  -     Lipid panel; Future; Expected date: 10/11/2018  -     atorvastatin (LIPITOR) 20 MG tablet; Take 1 tablet (20 mg total) by mouth once daily.  Dispense: 90 tablet; Refill: 3    Benign hypertension  -     CBC auto differential; Future; Expected date: 10/11/2018  -     Comprehensive metabolic panel; Future; Expected date: 10/11/2018  -     metoprolol succinate (TOPROL-XL) 25 MG 24 hr tablet; Take 1 tablet (25 mg total) by mouth once daily.  Dispense: 90 tablet; Refill: 3    Abdominal pain, epigastric  Comments:  Status post EGD with gastritis  Orders:  -     Sedimentation rate; Future; Expected date: 10/11/2018  -     Lipase; Future; Expected date: 10/11/2018    Gastroesophageal reflux disease, esophagitis presence not specified  -     esomeprazole (NEXIUM) 40 MG capsule; Take 1  capsule (40 mg total) by mouth before breakfast.  Dispense: 90 capsule; Refill: 3    Chronic non-seasonal allergic rhinitis  -     levocetirizine (XYZAL) 5 MG tablet; Take 1 tablet (5 mg total) by mouth every evening.  Dispense: 30 tablet; Refill: 3    Seasonal allergic rhinitis, unspecified trigger  -     montelukast (SINGULAIR) 10 mg tablet; Take 1 tablet (10 mg total) by mouth every evening.  Dispense: 30 tablet; Refill: 5    Allergic conjunctivitis of both eyes  -     olopatadine (PAZEO) 0.7 % Drop; Place 1 drop into both eyes once daily.  Dispense: 2.5 mL; Refill: 2    Hyperglycemia  -     blood sugar diagnostic Strp; 1 strip by Misc.(Non-Drug; Combo Route) route 2 (two) times daily.  Dispense: 100 strip; Refill: 5  -     Hemoglobin A1c; Future; Expected date: 10/11/2018  -     Microalbumin/creatinine urine ratio; Future; Expected date: 10/11/2018          a repeat blood test today and will give the injection of Celestone and B12 further treatment after the lab results available

## 2018-10-15 ENCOUNTER — OFFICE VISIT (OUTPATIENT)
Dept: UROGYNECOLOGY | Facility: CLINIC | Age: 73
End: 2018-10-15
Payer: MEDICARE

## 2018-10-15 VITALS
SYSTOLIC BLOOD PRESSURE: 110 MMHG | DIASTOLIC BLOOD PRESSURE: 80 MMHG | BODY MASS INDEX: 22.98 KG/M2 | WEIGHT: 117.06 LBS | HEIGHT: 60 IN

## 2018-10-15 DIAGNOSIS — K59.01 SLOW TRANSIT CONSTIPATION: ICD-10-CM

## 2018-10-15 DIAGNOSIS — N36.2 URETHRAL CARUNCLE: Primary | ICD-10-CM

## 2018-10-15 DIAGNOSIS — N39.3 SUI (STRESS URINARY INCONTINENCE, FEMALE): ICD-10-CM

## 2018-10-15 DIAGNOSIS — N95.2 ATROPHIC VAGINITIS: ICD-10-CM

## 2018-10-15 PROCEDURE — 3074F SYST BP LT 130 MM HG: CPT | Mod: CPTII,,, | Performed by: OBSTETRICS & GYNECOLOGY

## 2018-10-15 PROCEDURE — 3079F DIAST BP 80-89 MM HG: CPT | Mod: CPTII,,, | Performed by: OBSTETRICS & GYNECOLOGY

## 2018-10-15 PROCEDURE — 99213 OFFICE O/P EST LOW 20 MIN: CPT | Mod: PBBFAC | Performed by: OBSTETRICS & GYNECOLOGY

## 2018-10-15 PROCEDURE — 1101F PT FALLS ASSESS-DOCD LE1/YR: CPT | Mod: CPTII,,, | Performed by: OBSTETRICS & GYNECOLOGY

## 2018-10-15 PROCEDURE — 99213 OFFICE O/P EST LOW 20 MIN: CPT | Mod: S$PBB,,, | Performed by: OBSTETRICS & GYNECOLOGY

## 2018-10-15 PROCEDURE — 99999 PR PBB SHADOW E&M-EST. PATIENT-LVL III: CPT | Mod: PBBFAC,,, | Performed by: OBSTETRICS & GYNECOLOGY

## 2018-10-15 RX ORDER — DOCUSATE SODIUM 100 MG/1
100 CAPSULE, LIQUID FILLED ORAL 2 TIMES DAILY
Qty: 60 CAPSULE | Refills: 11 | Status: SHIPPED | OUTPATIENT
Start: 2018-10-15 | End: 2018-11-14

## 2018-10-15 NOTE — PROGRESS NOTES
Subjective:       Patient ID: Louis Bronson is a 73 y.o. female.    Chief Complaint:  urethral caruncle      History of Present Illness  HPI 73 Y O F with  has a past medical history of Hyperlipidemia and Hypertension. referred by Dr. Coates for evaluation of vaginal atrophy and a ? Vaginal lesion noted on exam.   Here for follow up doing well, symptoms much improved. Using the vagina estrogen cream as instructed. Does have some bothersome constipation. Rare occasional stress incontinence not bothersome.     [facility]     Question 7/17/2018 11:25 AM CDT   General Urogynecology: Are you experiencing the following?    Dysuria (painful urination) No   Nocturia:  waking up at night to empty your bladder  Yes   If you answered yes to the previous question, how many times does this happen per night? 1-2   Enuresis (urine loss during sleep) No   Dribbling urine after you urinate No   Hematuria (urine appears red) No   Type of stream Strong   Urinary frequency: How often a day are you going to the bathroom per day?  Less than 10   Urinary Tract Infections: How many Urinary Tract Infections have you had in the past year? One (1) in the past year   If you have had a UTI in the past year, what treatments have you had so far?  Other   Urinary Incontinence (General): Are you experiencing the following?    Past consultation for incontinence: Have you ever seen someone for the evaluation of incontinence? No   If you answered yes to the previous question, please select all the therapies you have tried.  None of the above   Please note the effectiveness of the therapies.    Need to wear protection to keep clothes dry  No   If you answered yes to the previous question, please emiliano the protection you use.  None   If you wear protection, how much wetness is typically on each pad? N/a- I do not wear protection   If you wear protection, how often do you have to change per day, if applicable?  0   Stress Symptoms: Are you  "experiencing the following?    Leakage of urine with cough, laugh and/or sneeze No   If you answered yes to the previous question, what is the frequency in days, weeks and/or months? Never   Leakage of urine with sex No   Leakage of urine with bending/ lifting No   Leakage of urine with briskly walking or jogging No   If you lose urine for any other reason not previously mentioned, please note it below, if applicable.     Urge Symptoms: Are you experiencing the following?    Urgency ("got to go" feeling) Yes   Urge: How frequently do you feel an urge to urinate (feeling like you "gotta go" to the bathroom and can't wait) Monthly   Do you experience a leakage of urine when you have a feeling of urgency?  No   Leakage of urine when unaware No   Past use of anticholinergics (medications used to treat overactive bladder) No   If you answered yes to the previous question, please emiliano the anticholinergics you have used:     Have you ever used Mirbetriq (aka Mirabegron)?  No   Prolapse Symptoms: Are you experiencing any of the following?     Falling out/ Bulging/ Heaviness in the vagina No   Vaginal/ Abdominal Pain/ Pressure No   Need to strain/ Push to void No   Need to wait on the toilet before you void No   Unusual position to urinate (using your hands to push back the vaginal bulge) No   Sensation of incomplete emptying No   Past use of pessary device No   If you answered yes to the previous question, please list the devices you have used below.     Bowel Symptoms: Are you experiencing any of the following?    Constipation Yes   Diarrhea  No   Hematochezia (bloody stool) No   Incomplete evacuation of stool No   Involuntary loss of formed stool No   Fecal smearing/urgency No   Involuntary loss of gas No   Vaginal Symptoms: Are you experiencing any of the following?     Abnormal vaginal bleeding  No   Vaginal dryness Yes   Sexually active  Yes   Dyspareunia (painful intercourse) No   Estrogen use  Yes       GYN & OB " History  No LMP recorded (lmp unknown). Patient is postmenopausal.   Date of Last Pap: 5/3/2018    OB History    Para Term  AB Living   14 13 13     12   SAB TAB Ectopic Multiple Live Births           13      # Outcome Date GA Lbr Edvin/2nd Weight Sex Delivery Anes PTL Lv   14             13 Term     M Vag-Spont   RAFAEL   12 Term     F Vag-Spont   RAFAEL   11 Term     F Vag-Spont   RAFAEL   10 Term     M Vag-Spont   RAFAEL   9 Term     M Vag-Spont   RAFAEL   8 Term     M Vag-Spont   RAFAEL   7 Term     F Vag-Spont   RAFAEL   6 Term     M Vag-Spont   RAFAEL   5 Term     M Vag-Spont   RAFAEL   4 Term     M Vag-Spont   RAFAEL   3 Term     M Vag-Spont   RAFAEL   2 Term     M Vag-Spont      1 Term     M Vag-Spont   RAFAEL        OB  Largest:  6#  Forceps: No   Episiotomy:  Yes    GYN  Menarche: 13  Menstrual cycle: n/a  Menopause: 50's  Hysterectomy: no   Ovaries: present       Review of Systems  Review of Systems   Constitutional: Negative for activity change, appetite change, chills, diaphoresis, fatigue, fever and unexpected weight change.   Respiratory: Negative for cough.    Gastrointestinal: Negative for abdominal distention, abdominal pain, anal bleeding, blood in stool, constipation, diarrhea, nausea, rectal pain and vomiting.   Genitourinary: Negative for decreased urine volume, difficulty urinating, dyspareunia, dysuria, enuresis, flank pain, frequency, genital sores, hematuria, menstrual problem, pelvic pain, urgency, vaginal bleeding, vaginal discharge and vaginal pain.   Musculoskeletal: Negative for back pain.   Skin: Negative for color change, pallor, rash and wound.   Allergic/Immunologic: Negative for environmental allergies, food allergies and immunocompromised state.   Hematological: Negative for adenopathy. Does not bruise/bleed easily.   Psychiatric/Behavioral: Negative for agitation, behavioral problems, confusion and sleep disturbance.           Objective:     Physical Exam   Constitutional: She is oriented to  person, place, and time. She appears well-developed.   HENT:   Head: Normocephalic and atraumatic.   Eyes: Conjunctivae and EOM are normal.   Neck: Normal range of motion. Neck supple.   Cardiovascular: Normal rate, regular rhythm, S1 normal, S2 normal, normal heart sounds and intact distal pulses.   Pulmonary/Chest: Effort normal and breath sounds normal. She exhibits no tenderness.   Abdominal: Soft. Bowel sounds are normal. She exhibits no distension and no mass. There is no hepatosplenomegaly, splenomegaly or hepatomegaly. There is no tenderness. There is no rigidity, no rebound, no guarding and no CVA tenderness. No hernia.   Genitourinary: Pelvic exam was performed with patient supine. Rectum normal, vagina normal, uterus normal, cervix normal, skenes normal and bartholins normal. Right labia normal and left labia normal. Urethra exhibits urethral caruncle and hypermobility. Urethra exhibits no urethral diverticulum and no urethral mass. Right bartholin is not enlarged and not tender. Left bartholin is not enlarged and not tender. Rectal exam shows resting tone normal and active tone normal. Rectal exam shows no external hemorrhoid, no fissure, no tenderness, anal tone normal and no dovetailing. Guaiac negative stool. There is atrophy in the vagina. No foreign body, tenderness, bleeding, unspecified prolapse of vaginal walls, fistula, mesh exposure or lavator tenderness in the vagina. No vaginal discharge found. Right adnexum displays no mass and no tenderness. Left adnexum displays no mass and no tenderness. Cervix exhibits no motion tenderness and no discharge. Uterus is not tender and not experiencing uterine prolapse.   PVR: 80 ML  Empty cough stress test: Positive.  Kegel: 2/5    POP-Q  Aa: -1 Ba: -1 C: -5   GH: 3 PB: 2 TVL: 9   Ap: -1 Bp: -1 D: -6                     Musculoskeletal: Normal range of motion.   Lymphadenopathy:     She has no axillary adenopathy.        Right: No inguinal adenopathy  present.        Left: No inguinal adenopathy present.   Neurological: She is alert and oriented to person, place, and time. She has normal strength and normal reflexes. Cranial nerves II through XII intact. No cranial nerve deficit.   Skin: Skin is warm, dry and intact.   Psychiatric: She has a normal mood and affect. Her speech is normal and behavior is normal. Judgment normal. Cognition and memory are normal.           HCM  Pap's: ap's normal   Mammo: Normal 2018  Colonoscopy: Normal 2018  Dexa: Osteopenia 2018        Assessment:        1. Urethral caruncle    2. DINAH (stress urinary incontinence, female)    3. Slow transit constipation    4. Atrophic vaginitis            Plan:      1 Vaginal atrophy (dryness):  Urethral caruncle: Use 1 gram of estrogen cream in vagina twice a week     2.  Osteopenia  Recommend evaluation with PMD     3. Constipation:   Controlling constipation may help bladder urgency/leakage and fiber may better control cholesterol and blood glucose.  Start daily fiber.  Take 1 tsp of fiber powder (psyllium or other sugar-free powder).  Mix in 8 oz of water.  Take x 3-5 days.  Then, increase fiber by 1 tsp every 3-5 days until stool is easy to pass.  Stop and continue at that dose.   Do not exceed 6 tsps/day.  Start stool  Daily colace.      4. Stress urinary Urinary incontinence rare and not bothersome to patient   --Empty bladder every 3 hours.  Empty well: wait a minute, lean forward on toilet.    --Avoid dietary irritants (see sheet).  Keep diary x 3-5 days to determine your irritants.  --KEGELS: do 10 in AM and 10 in PM, holding each x 10 seconds.  When you feel urge to go, STOP, KEGEL, and when urge has passed, then go to bathroom.  Consider PT in future.      Approximately  25 min were spent in consult, 90 % in discussion.     Batsheva Barragan DO  Female Pelvic Medicine and Reconstructive Surgery  [facility] [place], [place]

## 2018-10-15 NOTE — PATIENT INSTRUCTIONS
1 Vaginal atrophy (dryness):  Urethral caruncle: Use 1 gram of estrogen cream in vagina twice a week     2.  Osteopenia  Recommend evaluation with PMD     3. Constipation:   Controlling constipation may help bladder urgency/leakage and fiber may better control cholesterol and blood glucose.  Start daily fiber.  Take 1 tsp of fiber powder (psyllium or other sugar-free powder).  Mix in 8 oz of water.  Take x 3-5 days.  Then, increase fiber by 1 tsp every 3-5 days until stool is easy to pass.  Stop and continue at that dose.   Do not exceed 6 tsps/day.  Start stool  Daily colace.      Approximately  25 min were spent in consult, 90 % in discussion.

## 2018-10-22 ENCOUNTER — OFFICE VISIT (OUTPATIENT)
Dept: PRIMARY CARE CLINIC | Facility: CLINIC | Age: 73
End: 2018-10-22
Payer: MEDICARE

## 2018-10-22 VITALS
OXYGEN SATURATION: 96 % | RESPIRATION RATE: 18 BRPM | HEART RATE: 65 BPM | SYSTOLIC BLOOD PRESSURE: 119 MMHG | DIASTOLIC BLOOD PRESSURE: 75 MMHG | TEMPERATURE: 99 F | HEIGHT: 60 IN | WEIGHT: 116.5 LBS | BODY MASS INDEX: 22.87 KG/M2

## 2018-10-22 DIAGNOSIS — I10 BENIGN HYPERTENSION: ICD-10-CM

## 2018-10-22 DIAGNOSIS — E78.5 HYPERLIPIDEMIA, UNSPECIFIED HYPERLIPIDEMIA TYPE: ICD-10-CM

## 2018-10-22 DIAGNOSIS — M85.80 OSTEOPENIA, UNSPECIFIED LOCATION: Primary | ICD-10-CM

## 2018-10-22 PROCEDURE — 99999 PR PBB SHADOW E&M-EST. PATIENT-LVL IV: CPT | Mod: PBBFAC,,, | Performed by: INTERNAL MEDICINE

## 2018-10-22 PROCEDURE — 99213 OFFICE O/P EST LOW 20 MIN: CPT | Mod: S$PBB,,, | Performed by: INTERNAL MEDICINE

## 2018-10-22 PROCEDURE — 1101F PT FALLS ASSESS-DOCD LE1/YR: CPT | Mod: CPTII,,, | Performed by: INTERNAL MEDICINE

## 2018-10-22 PROCEDURE — 3078F DIAST BP <80 MM HG: CPT | Mod: CPTII,,, | Performed by: INTERNAL MEDICINE

## 2018-10-22 PROCEDURE — 99214 OFFICE O/P EST MOD 30 MIN: CPT | Mod: PBBFAC,PN | Performed by: INTERNAL MEDICINE

## 2018-10-22 PROCEDURE — 3074F SYST BP LT 130 MM HG: CPT | Mod: CPTII,,, | Performed by: INTERNAL MEDICINE

## 2018-10-23 NOTE — PROGRESS NOTES
Subjective:       Patient ID: Louis Bronson is a 73 y.o. female.    Chief Complaint: Follow-up; Results; and Not Feeling Well    HPI  patient is safe for blood test results :  Her labs are normal bone density osteopenia recommend calcium with vitamin D no shortness of breath or chest pain  Review of Systems    Objective:      Physical Exam   Constitutional: She is oriented to person, place, and time. She appears well-developed and well-nourished. No distress.   HENT:   Head: Normocephalic and atraumatic.   Right Ear: External ear normal.   Left Ear: External ear normal.   Nose: Nose normal.   Mouth/Throat: Oropharynx is clear and moist. No oropharyngeal exudate.   Eyes: Conjunctivae and EOM are normal. Pupils are equal, round, and reactive to light. Right eye exhibits no discharge. Left eye exhibits no discharge.   Neck: Normal range of motion. Neck supple. No thyromegaly present.   Cardiovascular: Normal rate, regular rhythm, normal heart sounds and intact distal pulses. Exam reveals no gallop and no friction rub.   No murmur heard.  Pulmonary/Chest: Effort normal and breath sounds normal. No respiratory distress. She has no wheezes. She has no rales. She exhibits no tenderness.   Abdominal: Soft. Bowel sounds are normal. She exhibits no distension. There is no tenderness. There is no rebound and no guarding.   Musculoskeletal: Normal range of motion. She exhibits no edema, tenderness or deformity.   Lymphadenopathy:     She has no cervical adenopathy.   Neurological: She is alert and oriented to person, place, and time.   Skin: Skin is warm and dry. Capillary refill takes less than 2 seconds. No rash noted. No erythema.   Psychiatric: She has a normal mood and affect. Judgment and thought content normal.   Nursing note and vitals reviewed.      Assessment:       1. Osteopenia, unspecified location    2. Benign hypertension    3. Hyperlipidemia, unspecified hyperlipidemia type        Plan:       Osteopenia,  unspecified location    Benign hypertension  Comments:  Stable on medication no change in treatment    Hyperlipidemia, unspecified hyperlipidemia type  Comments:  Control with diet and medication continue with treatment

## 2018-11-30 ENCOUNTER — TELEPHONE (OUTPATIENT)
Dept: PRIMARY CARE CLINIC | Facility: CLINIC | Age: 73
End: 2018-11-30

## 2018-11-30 NOTE — TELEPHONE ENCOUNTER
----- Message from Breanne Iqbal sent at 11/30/2018  8:06 AM CST -----  Contact: daughter, Kami Bronson  Patient needs appointment on 12/04/18 due to arms and hands hurting. Patient would like to be seen with spouse, LOKESH BRONSON [[id number]] at 9:30. Please call patient's daughter, Kami Bronson at [phone]. Thanks!

## 2018-12-04 ENCOUNTER — OFFICE VISIT (OUTPATIENT)
Dept: PRIMARY CARE CLINIC | Facility: CLINIC | Age: 73
End: 2018-12-04
Payer: MEDICARE

## 2018-12-04 VITALS
OXYGEN SATURATION: 97 % | BODY MASS INDEX: 23.07 KG/M2 | RESPIRATION RATE: 18 BRPM | HEIGHT: 60 IN | HEART RATE: 63 BPM | SYSTOLIC BLOOD PRESSURE: 107 MMHG | TEMPERATURE: 98 F | DIASTOLIC BLOOD PRESSURE: 63 MMHG | WEIGHT: 117.5 LBS

## 2018-12-04 DIAGNOSIS — S13.9XXA NECK SPRAIN, INITIAL ENCOUNTER: ICD-10-CM

## 2018-12-04 DIAGNOSIS — M75.51 BURSITIS OF RIGHT SHOULDER: Primary | ICD-10-CM

## 2018-12-04 PROCEDURE — 99999 PR PBB SHADOW E&M-EST. PATIENT-LVL IV: CPT | Mod: PBBFAC,,, | Performed by: INTERNAL MEDICINE

## 2018-12-04 PROCEDURE — 99213 OFFICE O/P EST LOW 20 MIN: CPT | Mod: 25,S$GLB,, | Performed by: INTERNAL MEDICINE

## 2018-12-04 PROCEDURE — 3078F DIAST BP <80 MM HG: CPT | Mod: CPTII,S$GLB,, | Performed by: INTERNAL MEDICINE

## 2018-12-04 PROCEDURE — 3074F SYST BP LT 130 MM HG: CPT | Mod: CPTII,S$GLB,, | Performed by: INTERNAL MEDICINE

## 2018-12-04 PROCEDURE — 1101F PT FALLS ASSESS-DOCD LE1/YR: CPT | Mod: CPTII,S$GLB,, | Performed by: INTERNAL MEDICINE

## 2018-12-04 PROCEDURE — 96372 THER/PROPH/DIAG INJ SC/IM: CPT | Mod: S$GLB,,, | Performed by: INTERNAL MEDICINE

## 2018-12-04 RX ORDER — TRIAMCINOLONE ACETONIDE 40 MG/ML
80 INJECTION, SUSPENSION INTRA-ARTICULAR; INTRAMUSCULAR
Status: COMPLETED | OUTPATIENT
Start: 2018-12-04 | End: 2018-12-04

## 2018-12-04 RX ADMIN — TRIAMCINOLONE ACETONIDE 80 MG: 40 INJECTION, SUSPENSION INTRA-ARTICULAR; INTRAMUSCULAR at 11:12

## 2018-12-04 NOTE — PROGRESS NOTES
Patient verified by name and . Allergies verified with patient. Administered Kenalog 2cc/80mg IM to Right Ventrogluteal using aseptic technique per physician's order. Aspirated with no blood noted. Patient tolerated well with no adverse effects.

## 2018-12-04 NOTE — PROGRESS NOTES
Subjective:       Patient ID: Louis Bronson is a 73 y.o. female.    Chief Complaint: Arm Pain    HPI  patient complained of right shoulder and dry upper shoulder the right lower neck pain for several day did worsen after see work in her garden every day pulling lifting.  No other joint pain no short of breath or chest pain no dyspnea with exertion cannot raise the shoulder of more than 80 degree cannot extend more than 10 degree patient did well with the cortisone injection couple months ago request another injection today  Review of Systems    Objective:      Physical Exam   Constitutional: She is oriented to person, place, and time. She appears well-developed and well-nourished. No distress.   HENT:   Head: Normocephalic and atraumatic.   Right Ear: External ear normal.   Left Ear: External ear normal.   Nose: Nose normal.   Mouth/Throat: Oropharynx is clear and moist. No oropharyngeal exudate.   Eyes: Conjunctivae and EOM are normal. Pupils are equal, round, and reactive to light. Right eye exhibits no discharge. Left eye exhibits no discharge.   Neck: Normal range of motion. Neck supple. No thyromegaly present.   Cardiovascular: Normal rate, regular rhythm, normal heart sounds and intact distal pulses. Exam reveals no gallop and no friction rub.   No murmur heard.  Pulmonary/Chest: Effort normal and breath sounds normal. No respiratory distress. She has no wheezes. She has no rales. She exhibits no tenderness.   Abdominal: Soft. Bowel sounds are normal. She exhibits no distension. There is no tenderness. There is no rebound and no guarding.   Musculoskeletal: Normal range of motion. She exhibits tenderness (Tenderness of the right shoulder right scapula and right lower leg right lower neck a with abduction of the right shoulder more than 70 degree or any extension). She exhibits no edema or deformity.   Lymphadenopathy:     She has no cervical adenopathy.   Neurological: She is alert and oriented to person,  place, and time.   Skin: Skin is warm and dry. Capillary refill takes less than 2 seconds. No rash noted. No erythema.   Psychiatric: She has a normal mood and affect. Judgment and thought content normal.   Nursing note and vitals reviewed.      Assessment:       1. Bursitis of right shoulder    2. Neck sprain, initial encounter        Plan:       Bursitis of right shoulder  -     triamcinolone acetonide injection 80 mg    Neck sprain, initial encounter  -     triamcinolone acetonide injection 80 mg

## 2018-12-05 NOTE — PATIENT INSTRUCTIONS
Discussed physical therapy with patient live far away cannot come 3 times a week for therapy need transportation

## 2018-12-17 RX ORDER — ESTRADIOL 0.1 MG/G
CREAM VAGINAL
Qty: 42.5 G | Refills: 1 | Status: SHIPPED | OUTPATIENT
Start: 2018-12-17 | End: 2019-12-23

## 2019-02-04 ENCOUNTER — OFFICE VISIT (OUTPATIENT)
Dept: PRIMARY CARE CLINIC | Facility: CLINIC | Age: 74
End: 2019-02-04
Payer: MEDICARE

## 2019-02-04 VITALS
TEMPERATURE: 98 F | HEART RATE: 86 BPM | SYSTOLIC BLOOD PRESSURE: 115 MMHG | RESPIRATION RATE: 18 BRPM | HEIGHT: 62 IN | WEIGHT: 114.38 LBS | BODY MASS INDEX: 21.05 KG/M2 | OXYGEN SATURATION: 97 % | DIASTOLIC BLOOD PRESSURE: 72 MMHG

## 2019-02-04 DIAGNOSIS — I10 BENIGN HYPERTENSION: Primary | ICD-10-CM

## 2019-02-04 DIAGNOSIS — M75.51 BURSITIS OF RIGHT SHOULDER: ICD-10-CM

## 2019-02-04 PROCEDURE — 99999 PR PBB SHADOW E&M-EST. PATIENT-LVL IV: ICD-10-PCS | Mod: PBBFAC,,, | Performed by: INTERNAL MEDICINE

## 2019-02-04 PROCEDURE — 3074F SYST BP LT 130 MM HG: CPT | Mod: CPTII,S$GLB,, | Performed by: INTERNAL MEDICINE

## 2019-02-04 PROCEDURE — 1101F PT FALLS ASSESS-DOCD LE1/YR: CPT | Mod: CPTII,S$GLB,, | Performed by: INTERNAL MEDICINE

## 2019-02-04 PROCEDURE — 1101F PR PT FALLS ASSESS DOC 0-1 FALLS W/OUT INJ PAST YR: ICD-10-PCS | Mod: CPTII,S$GLB,, | Performed by: INTERNAL MEDICINE

## 2019-02-04 PROCEDURE — 99499 RISK ADDL DX/OHS AUDIT: ICD-10-PCS | Mod: S$GLB,,, | Performed by: INTERNAL MEDICINE

## 2019-02-04 PROCEDURE — 3078F PR MOST RECENT DIASTOLIC BLOOD PRESSURE < 80 MM HG: ICD-10-PCS | Mod: CPTII,S$GLB,, | Performed by: INTERNAL MEDICINE

## 2019-02-04 PROCEDURE — 99213 OFFICE O/P EST LOW 20 MIN: CPT | Mod: S$GLB,,, | Performed by: INTERNAL MEDICINE

## 2019-02-04 PROCEDURE — 3078F DIAST BP <80 MM HG: CPT | Mod: CPTII,S$GLB,, | Performed by: INTERNAL MEDICINE

## 2019-02-04 PROCEDURE — 99499 UNLISTED E&M SERVICE: CPT | Mod: S$GLB,,, | Performed by: INTERNAL MEDICINE

## 2019-02-04 PROCEDURE — 99999 PR PBB SHADOW E&M-EST. PATIENT-LVL IV: CPT | Mod: PBBFAC,,, | Performed by: INTERNAL MEDICINE

## 2019-02-04 PROCEDURE — 3074F PR MOST RECENT SYSTOLIC BLOOD PRESSURE < 130 MM HG: ICD-10-PCS | Mod: CPTII,S$GLB,, | Performed by: INTERNAL MEDICINE

## 2019-02-04 PROCEDURE — 99213 PR OFFICE/OUTPT VISIT, EST, LEVL III, 20-29 MIN: ICD-10-PCS | Mod: S$GLB,,, | Performed by: INTERNAL MEDICINE

## 2019-02-04 NOTE — PROGRESS NOTES
Subjective:       Patient ID: Louis Bronson is a 73 y.o. female.    Chief Complaint: Follow-up    HPI patient is here for follow-up complain of aching pain in the right shoulder when working outside in her garden no short of breath or chest pain blood pressure stable had flu vaccine a needs refill on her medication  Review of Systems    Objective:      Physical Exam   Constitutional: She is oriented to person, place, and time. She appears well-developed and well-nourished. No distress.   HENT:   Head: Normocephalic and atraumatic.   Right Ear: External ear normal.   Left Ear: External ear normal.   Nose: Nose normal.   Mouth/Throat: Oropharynx is clear and moist. No oropharyngeal exudate.   Eyes: Conjunctivae and EOM are normal. Pupils are equal, round, and reactive to light. Right eye exhibits no discharge. Left eye exhibits no discharge.   Neck: Normal range of motion. Neck supple. No thyromegaly present.   Cardiovascular: Normal rate, regular rhythm, normal heart sounds and intact distal pulses. Exam reveals no gallop and no friction rub.   No murmur heard.  Pulmonary/Chest: Effort normal and breath sounds normal. No respiratory distress. She has no wheezes. She has no rales. She exhibits no tenderness.   Abdominal: Soft. Bowel sounds are normal. She exhibits no distension. There is no tenderness. There is no rebound and no guarding.   Musculoskeletal: Normal range of motion. She exhibits no edema, tenderness or deformity.   Lymphadenopathy:     She has no cervical adenopathy.   Neurological: She is alert and oriented to person, place, and time.   Skin: Skin is warm and dry. Capillary refill takes less than 2 seconds. No rash noted. No erythema.   Psychiatric: She has a normal mood and affect. Judgment and thought content normal.   Nursing note and vitals reviewed.      Assessment:       1. Benign hypertension    2. Bursitis of right shoulder        Plan:       Benign hypertension  Comments:  Stable on  medication    Bursitis of right shoulder  Comments:  Tylenol p.r.n. pain avoid NSAID due to history of peptic ulcer disease a

## 2019-04-30 ENCOUNTER — PATIENT OUTREACH (OUTPATIENT)
Dept: ADMINISTRATIVE | Facility: HOSPITAL | Age: 74
End: 2019-04-30

## 2019-04-30 DIAGNOSIS — Z12.39 BREAST CANCER SCREENING: ICD-10-CM

## 2019-04-30 DIAGNOSIS — Z11.59 NEED FOR HEPATITIS C SCREENING TEST: ICD-10-CM

## 2019-04-30 NOTE — PROGRESS NOTES
Immunizations reviewed. Legacy reviewed. Orders placed for Hepatitis C Screening and Mammogram. Placed call to patient. Spoke w/ patient's daughter, Hieu Bronson. Patient scheduled for routine Mammogram. Can do Hep C Screening at appt. Pre-visit chart review completed.

## 2019-05-02 ENCOUNTER — TELEPHONE (OUTPATIENT)
Dept: PRIMARY CARE CLINIC | Facility: CLINIC | Age: 74
End: 2019-05-02

## 2019-05-02 NOTE — TELEPHONE ENCOUNTER
Spoke with daughter and notified that I changed patients mammogram appointment to have her mammogram done after her appointment with Dr Lerma

## 2019-05-02 NOTE — TELEPHONE ENCOUNTER
----- Message from Karla Black sent at 5/2/2019  8:04 AM CDT -----  Please call Daughter Kami [phone] ... Pt has appt on 05/14 th for Dr and 05/15 for mammogram .. Asking to have mammogram rescheduled to 05/14 after her Dr visit ? Last year 's mammogram was 05/14/2018

## 2019-05-14 ENCOUNTER — OFFICE VISIT (OUTPATIENT)
Dept: PRIMARY CARE CLINIC | Facility: CLINIC | Age: 74
End: 2019-05-14
Payer: MEDICARE

## 2019-05-14 ENCOUNTER — CLINICAL SUPPORT (OUTPATIENT)
Dept: PRIMARY CARE CLINIC | Facility: CLINIC | Age: 74
End: 2019-05-14
Payer: MEDICARE

## 2019-05-14 VITALS
BODY MASS INDEX: 20.48 KG/M2 | DIASTOLIC BLOOD PRESSURE: 75 MMHG | RESPIRATION RATE: 18 BRPM | SYSTOLIC BLOOD PRESSURE: 124 MMHG | OXYGEN SATURATION: 95 % | TEMPERATURE: 98 F | HEIGHT: 62 IN | HEART RATE: 77 BPM | WEIGHT: 111.31 LBS

## 2019-05-14 DIAGNOSIS — R10.13 ABDOMINAL PAIN, EPIGASTRIC: Primary | ICD-10-CM

## 2019-05-14 DIAGNOSIS — E78.5 HYPERLIPIDEMIA, UNSPECIFIED HYPERLIPIDEMIA TYPE: ICD-10-CM

## 2019-05-14 DIAGNOSIS — R53.83 FATIGUE, UNSPECIFIED TYPE: ICD-10-CM

## 2019-05-14 DIAGNOSIS — I10 BENIGN HYPERTENSION: ICD-10-CM

## 2019-05-14 DIAGNOSIS — R10.13 ABDOMINAL PAIN, EPIGASTRIC: ICD-10-CM

## 2019-05-14 DIAGNOSIS — Z11.59 NEED FOR HEPATITIS C SCREENING TEST: ICD-10-CM

## 2019-05-14 LAB
ALBUMIN SERPL BCP-MCNC: 4 G/DL (ref 3.5–5.2)
ALP SERPL-CCNC: 59 U/L (ref 38–126)
ALT SERPL W/O P-5'-P-CCNC: 24 U/L (ref 14–54)
ANION GAP SERPL CALC-SCNC: 9 MMOL/L (ref 8–16)
AST SERPL-CCNC: 29 U/L (ref 15–41)
BASOPHILS # BLD AUTO: 0.1 K/UL (ref 0–0.2)
BASOPHILS NFR BLD: 1.5 % (ref 0–1.9)
BILIRUB SERPL-MCNC: 0.8 MG/DL (ref 0.3–1.2)
BUN SERPL-MCNC: 18 MG/DL (ref 8–23)
CALCIUM SERPL-MCNC: 9.1 MG/DL (ref 8.6–10)
CHLORIDE SERPL-SCNC: 103 MMOL/L (ref 101–111)
CHOLEST SERPL-MCNC: 157 MG/DL (ref 80–200)
CHOLEST/HDLC SERPL: 2.5 {RATIO} (ref 2–5)
CO2 SERPL-SCNC: 28 MMOL/L (ref 23–29)
CREAT SERPL-MCNC: 0.7 MG/DL (ref 0.5–1.4)
DIFFERENTIAL METHOD: ABNORMAL
EOSINOPHIL # BLD AUTO: 0.1 K/UL (ref 0–0.5)
EOSINOPHIL NFR BLD: 2.8 % (ref 0–8)
ERYTHROCYTE [DISTWIDTH] IN BLOOD BY AUTOMATED COUNT: 13.6 % (ref 11.5–14.5)
ERYTHROCYTE [SEDIMENTATION RATE] IN BLOOD BY WESTERGREN METHOD: 10 MM/HR (ref 0–20)
EST. GFR  (AFRICAN AMERICAN): >60 ML/MIN/1.73 M^2
EST. GFR  (NON AFRICAN AMERICAN): >60 ML/MIN/1.73 M^2
GLUCOSE SERPL-MCNC: 85 MG/DL (ref 74–118)
HCT VFR BLD AUTO: 40.6 % (ref 37–48.5)
HDLC SERPL-MCNC: 62 MG/DL (ref 40–75)
HDLC SERPL: 39.5 % (ref 20–50)
HGB BLD-MCNC: 13.6 G/DL (ref 12–16)
LDLC SERPL CALC-MCNC: 75 MG/DL
LIPASE SERPL-CCNC: 32 U/L (ref 4–60)
LYMPHOCYTES # BLD AUTO: 1.9 K/UL (ref 1–4.8)
LYMPHOCYTES NFR BLD: 38.8 % (ref 18–48)
MCH RBC QN AUTO: 32 PG (ref 27–31)
MCHC RBC AUTO-ENTMCNC: 33.4 G/DL (ref 32–36)
MCV RBC AUTO: 96 FL (ref 82–98)
MONOCYTES # BLD AUTO: 0.4 K/UL (ref 0.3–1)
MONOCYTES NFR BLD: 7.4 % (ref 4–15)
NEUTROPHILS # BLD AUTO: 2.4 K/UL (ref 1.8–7.7)
NEUTROPHILS NFR BLD: 49.5 % (ref 38–73)
NONHDLC SERPL-MCNC: 95 MG/DL
PLATELET # BLD AUTO: 268 K/UL (ref 150–350)
PMV BLD AUTO: 7.9 FL (ref 9.2–12.9)
POTASSIUM SERPL-SCNC: 4.2 MMOL/L (ref 3.5–5.1)
PROT SERPL-MCNC: 7.3 G/DL (ref 6–8.4)
RBC # BLD AUTO: 4.24 M/UL (ref 4–5.4)
SODIUM SERPL-SCNC: 140 MMOL/L (ref 136–145)
T4 FREE SERPL-MCNC: 1.12 NG/DL (ref 0.61–1.12)
TRIGL SERPL-MCNC: 100 MG/DL (ref 30–150)
TSH SERPL DL<=0.005 MIU/L-ACNC: 0.31 UIU/ML (ref 0.45–5.33)
WBC # BLD AUTO: 4.8 K/UL (ref 3.9–12.7)

## 2019-05-14 PROCEDURE — 3078F PR MOST RECENT DIASTOLIC BLOOD PRESSURE < 80 MM HG: ICD-10-PCS | Mod: CPTII,S$GLB,, | Performed by: INTERNAL MEDICINE

## 2019-05-14 PROCEDURE — 99213 PR OFFICE/OUTPT VISIT, EST, LEVL III, 20-29 MIN: ICD-10-PCS | Mod: 25,S$GLB,, | Performed by: INTERNAL MEDICINE

## 2019-05-14 PROCEDURE — 80053 COMPREHEN METABOLIC PANEL: CPT

## 2019-05-14 PROCEDURE — 3074F PR MOST RECENT SYSTOLIC BLOOD PRESSURE < 130 MM HG: ICD-10-PCS | Mod: CPTII,S$GLB,, | Performed by: INTERNAL MEDICINE

## 2019-05-14 PROCEDURE — 1101F PT FALLS ASSESS-DOCD LE1/YR: CPT | Mod: CPTII,S$GLB,, | Performed by: INTERNAL MEDICINE

## 2019-05-14 PROCEDURE — 85025 COMPLETE CBC W/AUTO DIFF WBC: CPT

## 2019-05-14 PROCEDURE — 99213 OFFICE O/P EST LOW 20 MIN: CPT | Mod: 25,S$GLB,, | Performed by: INTERNAL MEDICINE

## 2019-05-14 PROCEDURE — 96372 THER/PROPH/DIAG INJ SC/IM: CPT | Mod: 59,S$GLB,, | Performed by: INTERNAL MEDICINE

## 2019-05-14 PROCEDURE — 99999 PR PBB SHADOW E&M-EST. PATIENT-LVL I: ICD-10-PCS | Mod: PBBFAC,,,

## 2019-05-14 PROCEDURE — 99499 RISK ADDL DX/OHS AUDIT: ICD-10-PCS | Mod: S$GLB,,, | Performed by: INTERNAL MEDICINE

## 2019-05-14 PROCEDURE — 3078F DIAST BP <80 MM HG: CPT | Mod: CPTII,S$GLB,, | Performed by: INTERNAL MEDICINE

## 2019-05-14 PROCEDURE — 99499 UNLISTED E&M SERVICE: CPT | Mod: S$GLB,,, | Performed by: INTERNAL MEDICINE

## 2019-05-14 PROCEDURE — 3074F SYST BP LT 130 MM HG: CPT | Mod: CPTII,S$GLB,, | Performed by: INTERNAL MEDICINE

## 2019-05-14 PROCEDURE — 99999 PR PBB SHADOW E&M-EST. PATIENT-LVL I: CPT | Mod: PBBFAC,,,

## 2019-05-14 PROCEDURE — 83690 ASSAY OF LIPASE: CPT

## 2019-05-14 PROCEDURE — 84443 ASSAY THYROID STIM HORMONE: CPT

## 2019-05-14 PROCEDURE — 1101F PR PT FALLS ASSESS DOC 0-1 FALLS W/OUT INJ PAST YR: ICD-10-PCS | Mod: CPTII,S$GLB,, | Performed by: INTERNAL MEDICINE

## 2019-05-14 PROCEDURE — 85651 RBC SED RATE NONAUTOMATED: CPT

## 2019-05-14 PROCEDURE — 99999 PR PBB SHADOW E&M-EST. PATIENT-LVL IV: ICD-10-PCS | Mod: PBBFAC,,, | Performed by: INTERNAL MEDICINE

## 2019-05-14 PROCEDURE — 86803 HEPATITIS C AB TEST: CPT

## 2019-05-14 PROCEDURE — 84439 ASSAY OF FREE THYROXINE: CPT

## 2019-05-14 PROCEDURE — 80061 LIPID PANEL: CPT

## 2019-05-14 PROCEDURE — 96372 PR INJECTION,THERAP/PROPH/DIAG2ST, IM OR SUBCUT: ICD-10-PCS | Mod: 59,S$GLB,, | Performed by: INTERNAL MEDICINE

## 2019-05-14 PROCEDURE — 99999 PR PBB SHADOW E&M-EST. PATIENT-LVL IV: CPT | Mod: PBBFAC,,, | Performed by: INTERNAL MEDICINE

## 2019-05-14 RX ORDER — CYANOCOBALAMIN 1000 UG/ML
2000 INJECTION, SOLUTION INTRAMUSCULAR; SUBCUTANEOUS
Status: DISCONTINUED | OUTPATIENT
Start: 2019-05-14 | End: 2022-03-15

## 2019-05-14 RX ORDER — METOPROLOL SUCCINATE 25 MG/1
25 TABLET, EXTENDED RELEASE ORAL DAILY
Qty: 90 TABLET | Refills: 3 | Status: SHIPPED | OUTPATIENT
Start: 2019-05-14 | End: 2020-05-25

## 2019-05-14 RX ADMIN — CYANOCOBALAMIN 2000 MCG: 1000 INJECTION, SOLUTION INTRAMUSCULAR; SUBCUTANEOUS at 12:05

## 2019-05-14 NOTE — PROGRESS NOTES
Subjective:       Patient ID: Louis Bronson is a 74 y.o. female.    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 Davenport no intervention needed will try to get medical record patient blood tests look very good diabetes well control no need to change any medication patient request B12 injection with has been helping her fatigue tired and patient scheduled for mammogram today to be done today at the [facility]  Review of Systems    Objective:      Physical Exam   Constitutional: She is oriented to person, place, and time. She appears well-developed and well-nourished. No distress.   HENT:   Head: Normocephalic and atraumatic.   Right Ear: External ear normal.   Left Ear: External ear normal.   Nose: Nose normal.   Mouth/Throat: Oropharynx is clear and moist. No oropharyngeal exudate.   Eyes: Pupils are equal, round, and reactive to light. Conjunctivae and EOM are normal. Right eye exhibits no discharge. Left eye exhibits no discharge.   Neck: Normal range of motion. Neck supple. No thyromegaly present.   Cardiovascular: Normal rate, regular rhythm, normal heart sounds and intact distal pulses. Exam reveals no gallop and no friction rub.   No murmur heard.  Pulmonary/Chest: Effort normal and breath sounds normal. No respiratory distress. She has no wheezes. She has no rales. She exhibits no tenderness.   Abdominal: Soft. Bowel sounds are normal. She exhibits no distension. There is no tenderness. There is no rebound and no guarding.   Musculoskeletal: Normal range of motion. She exhibits no edema, tenderness or deformity.   Lymphadenopathy:     She has no cervical adenopathy.   Neurological: She is alert and oriented to  person, place, and time.   Skin: Skin is warm and dry. Capillary refill takes less than 2 seconds. No rash noted. No erythema.   Psychiatric: She has a normal mood and affect. Judgment and thought content normal.   Nursing note and vitals reviewed.      Assessment:       1. Abdominal pain, epigastric    2. Benign hypertension    3. Hyperlipidemia, unspecified hyperlipidemia type    4. Fatigue, unspecified type        Plan:       Abdominal pain, epigastric  -     US Abdomen Complete; Future; Expected date: 05/14/2019  -     Lipase; Future; Expected date: 05/14/2019    Benign hypertension  -     metoprolol succinate (TOPROL-XL) 25 MG 24 hr tablet; Take 1 tablet (25 mg total) by mouth once daily.  Dispense: 90 tablet; Refill: 3  -     CBC auto differential; Future; Expected date: 05/14/2019  -     Comprehensive metabolic panel; Future; Expected date: 05/14/2019  -     Sedimentation rate; Future; Expected date: 05/14/2019  -     TSH; Future; Expected date: 05/14/2019    Hyperlipidemia, unspecified hyperlipidemia type  -     Lipid panel; Future; Expected date: 05/14/2019    Fatigue, unspecified type  -     cyanocobalamin injection 2,000 mcg  -     TSH; Future; Expected date: 05/14/2019

## 2019-05-14 NOTE — PROGRESS NOTES
Verified pt ID using name and  per family member. NKDA. Administered B12 2000 mcg in left VG per physician order using aseptic technique. Aspirated and no blood return noted. Pt tolerated well with no adverse reactions noted.

## 2019-05-15 LAB — HCV AB SERPL QL IA: NEGATIVE

## 2019-05-23 ENCOUNTER — TELEPHONE (OUTPATIENT)
Dept: PRIMARY CARE CLINIC | Facility: CLINIC | Age: 74
End: 2019-05-23

## 2019-05-23 DIAGNOSIS — R10.13 ABDOMINAL PAIN, EPIGASTRIC: Primary | ICD-10-CM

## 2019-05-23 DIAGNOSIS — K86.89 PANCREATIC MASS: ICD-10-CM

## 2019-05-23 NOTE — TELEPHONE ENCOUNTER
----- Message from Rai Lerma MD sent at 5/23/2019  7:55 AM CDT -----  Please call the patient regarding her abd u/s ? Pancreatic mass need CT abd and pelvic with and without contrast asap

## 2019-05-27 ENCOUNTER — HOSPITAL ENCOUNTER (OUTPATIENT)
Dept: RADIOLOGY | Facility: HOSPITAL | Age: 74
Discharge: HOME | End: 2019-05-27
Attending: INTERNAL MEDICINE
Payer: MEDICARE

## 2019-05-27 ENCOUNTER — OFFICE VISIT (OUTPATIENT)
Dept: PRIMARY CARE CLINIC | Facility: CLINIC | Age: 74
End: 2019-05-27
Payer: MEDICARE

## 2019-05-27 VITALS
DIASTOLIC BLOOD PRESSURE: 57 MMHG | OXYGEN SATURATION: 97 % | RESPIRATION RATE: 18 BRPM | SYSTOLIC BLOOD PRESSURE: 96 MMHG | TEMPERATURE: 98 F | HEIGHT: 62 IN | HEART RATE: 96 BPM | WEIGHT: 110.69 LBS | BODY MASS INDEX: 20.37 KG/M2

## 2019-05-27 DIAGNOSIS — K86.89 PANCREATIC MASS: Primary | ICD-10-CM

## 2019-05-27 DIAGNOSIS — K86.89 PANCREATIC MASS: ICD-10-CM

## 2019-05-27 PROCEDURE — 74177 CT ABD & PELVIS W/CONTRAST: CPT | Mod: 26,,, | Performed by: RADIOLOGY

## 2019-05-27 PROCEDURE — 25500020 PHARM REV CODE 255: Performed by: INTERNAL MEDICINE

## 2019-05-27 PROCEDURE — 3074F SYST BP LT 130 MM HG: CPT | Mod: CPTII,S$GLB,, | Performed by: INTERNAL MEDICINE

## 2019-05-27 PROCEDURE — 74177 CT ABD & PELVIS W/CONTRAST: CPT | Mod: TC

## 2019-05-27 PROCEDURE — 1101F PT FALLS ASSESS-DOCD LE1/YR: CPT | Mod: CPTII,S$GLB,, | Performed by: INTERNAL MEDICINE

## 2019-05-27 PROCEDURE — 1101F PR PT FALLS ASSESS DOC 0-1 FALLS W/OUT INJ PAST YR: ICD-10-PCS | Mod: CPTII,S$GLB,, | Performed by: INTERNAL MEDICINE

## 2019-05-27 PROCEDURE — 3078F DIAST BP <80 MM HG: CPT | Mod: CPTII,S$GLB,, | Performed by: INTERNAL MEDICINE

## 2019-05-27 PROCEDURE — 99999 PR PBB SHADOW E&M-EST. PATIENT-LVL III: CPT | Mod: PBBFAC,,, | Performed by: INTERNAL MEDICINE

## 2019-05-27 PROCEDURE — 99212 PR OFFICE/OUTPT VISIT, EST, LEVL II, 10-19 MIN: ICD-10-PCS | Mod: S$GLB,,, | Performed by: INTERNAL MEDICINE

## 2019-05-27 PROCEDURE — 99999 PR PBB SHADOW E&M-EST. PATIENT-LVL III: ICD-10-PCS | Mod: PBBFAC,,, | Performed by: INTERNAL MEDICINE

## 2019-05-27 PROCEDURE — 99212 OFFICE O/P EST SF 10 MIN: CPT | Mod: S$GLB,,, | Performed by: INTERNAL MEDICINE

## 2019-05-27 PROCEDURE — 74177 CT ABDOMEN PELVIS WITH CONTRAST: ICD-10-PCS | Mod: 26,,, | Performed by: RADIOLOGY

## 2019-05-27 PROCEDURE — 3074F PR MOST RECENT SYSTOLIC BLOOD PRESSURE < 130 MM HG: ICD-10-PCS | Mod: CPTII,S$GLB,, | Performed by: INTERNAL MEDICINE

## 2019-05-27 PROCEDURE — 3078F PR MOST RECENT DIASTOLIC BLOOD PRESSURE < 80 MM HG: ICD-10-PCS | Mod: CPTII,S$GLB,, | Performed by: INTERNAL MEDICINE

## 2019-05-27 RX ADMIN — IOHEXOL 75 ML: 350 INJECTION, SOLUTION INTRAVENOUS at 05:05

## 2019-05-28 NOTE — PROGRESS NOTES
Subjective:       Patient ID: Louis Bronson is a 74 y.o. female.    Chief Complaint: Follow-up    HPI Pt I luize for f/u had abd u/s to evaluate epigastric pain u/s show ? Pancreatic mass need CT scan abd pelvis w and w/o contrast   Review of Systems    Objective:      Physical Exam   Constitutional: She is oriented to person, place, and time. She appears well-developed and well-nourished. No distress.   HENT:   Head: Normocephalic and atraumatic.   Right Ear: External ear normal.   Left Ear: External ear normal.   Nose: Nose normal.   Mouth/Throat: Oropharynx is clear and moist. No oropharyngeal exudate.   Eyes: Pupils are equal, round, and reactive to light. Conjunctivae and EOM are normal. Right eye exhibits no discharge. Left eye exhibits no discharge.   Neck: Normal range of motion. Neck supple. No thyromegaly present.   Cardiovascular: Normal rate, regular rhythm, normal heart sounds and intact distal pulses. Exam reveals no gallop and no friction rub.   No murmur heard.  Pulmonary/Chest: Effort normal and breath sounds normal. No respiratory distress. She has no wheezes. She has no rales. She exhibits no tenderness.   Abdominal: Soft. Bowel sounds are normal. She exhibits no distension. There is no tenderness. There is no rebound and no guarding.   Musculoskeletal: Normal range of motion. She exhibits no edema, tenderness or deformity.   Lymphadenopathy:     She has no cervical adenopathy.   Neurological: She is alert and oriented to person, place, and time.   Skin: Skin is warm and dry. Capillary refill takes less than 2 seconds. No rash noted. No erythema.   Psychiatric: She has a normal mood and affect. Judgment and thought content normal.   Nursing note and vitals reviewed.      Assessment:       1. Pancreatic mass        Plan:       Pancreatic mass  Comments:  on abd u/s need ct scan abd and pelvic try to do today  Orders:  -     Cancel: CT Abdomen Pelvis W Wo Contrast; Future; Expected date:  05/27/2019

## 2019-06-26 ENCOUNTER — HOSPITAL ENCOUNTER (OUTPATIENT)
Dept: RADIOLOGY | Facility: OTHER | Age: 74
Discharge: HOME | End: 2019-06-26
Attending: INTERNAL MEDICINE
Payer: MEDICARE

## 2019-06-26 DIAGNOSIS — K25.9 GASTRIC ULCER DUE TO HELICOBACTER PYLORI: ICD-10-CM

## 2019-06-26 DIAGNOSIS — R10.13 ABDOMINAL PAIN, EPIGASTRIC: ICD-10-CM

## 2019-06-26 DIAGNOSIS — B96.81 GASTRIC ULCER DUE TO HELICOBACTER PYLORI: ICD-10-CM

## 2019-06-26 DIAGNOSIS — R93.89 ULTRASOUND SCAN ABNORMAL: ICD-10-CM

## 2019-06-26 DIAGNOSIS — K86.89 MASS OF PANCREAS: ICD-10-CM

## 2019-06-26 PROCEDURE — 74160 CT ABDOMEN W/CONTRAST: CPT | Mod: TC

## 2019-06-26 PROCEDURE — 74160 CT ABDOMEN W/CONTRAST: CPT | Mod: 26,,, | Performed by: RADIOLOGY

## 2019-06-26 PROCEDURE — 25500020 PHARM REV CODE 255: Performed by: INTERNAL MEDICINE

## 2019-06-26 PROCEDURE — 74160 CT ABDOMEN WITH CONTRAST: ICD-10-PCS | Mod: 26,,, | Performed by: RADIOLOGY

## 2019-06-26 RX ADMIN — IOHEXOL 75 ML: 350 INJECTION, SOLUTION INTRAVENOUS at 10:06

## 2019-11-08 DIAGNOSIS — E78.5 HYPERLIPIDEMIA, UNSPECIFIED HYPERLIPIDEMIA TYPE: ICD-10-CM

## 2019-11-08 DIAGNOSIS — K21.9 GASTROESOPHAGEAL REFLUX DISEASE, ESOPHAGITIS PRESENCE NOT SPECIFIED: ICD-10-CM

## 2019-11-08 DIAGNOSIS — J30.89 CHRONIC NON-SEASONAL ALLERGIC RHINITIS: ICD-10-CM

## 2019-11-10 RX ORDER — ATORVASTATIN CALCIUM 20 MG/1
TABLET, FILM COATED ORAL
Qty: 90 TABLET | Refills: 0 | Status: SHIPPED | OUTPATIENT
Start: 2019-11-10 | End: 2020-04-06

## 2019-11-10 RX ORDER — LEVOCETIRIZINE DIHYDROCHLORIDE 5 MG/1
TABLET, FILM COATED ORAL
Qty: 30 TABLET | Refills: 0 | Status: SHIPPED | OUTPATIENT
Start: 2019-11-10 | End: 2020-01-02

## 2019-11-10 RX ORDER — ESOMEPRAZOLE MAGNESIUM 40 MG/1
CAPSULE, DELAYED RELEASE ORAL
Qty: 90 CAPSULE | Refills: 0 | Status: SHIPPED | OUTPATIENT
Start: 2019-11-10 | End: 2020-02-26

## 2019-12-23 RX ORDER — ESTRADIOL 0.1 MG/G
CREAM VAGINAL
Qty: 43 G | Refills: 0 | Status: SHIPPED | OUTPATIENT
Start: 2019-12-23 | End: 2020-09-21 | Stop reason: SDUPTHER

## 2019-12-23 NOTE — TELEPHONE ENCOUNTER
Called patient to schedule annual exam. No answer. Left a message asking patient to return call so that appointment could be scheduled.

## 2020-01-01 DIAGNOSIS — J30.89 CHRONIC NON-SEASONAL ALLERGIC RHINITIS: ICD-10-CM

## 2020-01-02 RX ORDER — LEVOCETIRIZINE DIHYDROCHLORIDE 5 MG/1
TABLET, FILM COATED ORAL
Qty: 30 TABLET | Refills: 0 | Status: SHIPPED | OUTPATIENT
Start: 2020-01-02 | End: 2020-04-06

## 2020-02-24 DIAGNOSIS — K21.9 GASTROESOPHAGEAL REFLUX DISEASE, ESOPHAGITIS PRESENCE NOT SPECIFIED: ICD-10-CM

## 2020-02-26 RX ORDER — ESOMEPRAZOLE MAGNESIUM 40 MG/1
CAPSULE, DELAYED RELEASE ORAL
Qty: 90 CAPSULE | Refills: 0 | Status: SHIPPED | OUTPATIENT
Start: 2020-02-26 | End: 2020-05-25

## 2020-03-09 ENCOUNTER — OFFICE VISIT (OUTPATIENT)
Dept: PRIMARY CARE CLINIC | Facility: CLINIC | Age: 75
End: 2020-03-09
Payer: MEDICARE

## 2020-03-09 VITALS
BODY MASS INDEX: 19.13 KG/M2 | HEART RATE: 100 BPM | HEIGHT: 62 IN | DIASTOLIC BLOOD PRESSURE: 76 MMHG | WEIGHT: 103.94 LBS | SYSTOLIC BLOOD PRESSURE: 112 MMHG | TEMPERATURE: 98 F | RESPIRATION RATE: 18 BRPM | OXYGEN SATURATION: 97 %

## 2020-03-09 DIAGNOSIS — H10.13 ALLERGIC CONJUNCTIVITIS OF BOTH EYES: ICD-10-CM

## 2020-03-09 DIAGNOSIS — R39.9 UTI SYMPTOMS: Primary | ICD-10-CM

## 2020-03-09 DIAGNOSIS — N30.90 CYSTITIS: ICD-10-CM

## 2020-03-09 DIAGNOSIS — R31.0 GROSS HEMATURIA: ICD-10-CM

## 2020-03-09 LAB
BILIRUB SERPL-MCNC: NORMAL MG/DL
BLOOD URINE, POC: ABNORMAL
COLOR, POC UA: YELLOW
GLUCOSE UR QL STRIP: NORMAL
KETONES UR QL STRIP: ABNORMAL
LEUKOCYTE ESTERASE URINE, POC: ABNORMAL
NITRITE, POC UA: ABNORMAL
PH, POC UA: 6
PROTEIN, POC: ABNORMAL
SPECIFIC GRAVITY, POC UA: 1.01
UROBILINOGEN, POC UA: ABNORMAL

## 2020-03-09 PROCEDURE — 81002 URINALYSIS NONAUTO W/O SCOPE: CPT | Mod: S$GLB,,, | Performed by: INTERNAL MEDICINE

## 2020-03-09 PROCEDURE — 1125F AMNT PAIN NOTED PAIN PRSNT: CPT | Mod: S$GLB,,, | Performed by: INTERNAL MEDICINE

## 2020-03-09 PROCEDURE — 99213 OFFICE O/P EST LOW 20 MIN: CPT | Mod: 25,S$GLB,, | Performed by: INTERNAL MEDICINE

## 2020-03-09 PROCEDURE — 99213 PR OFFICE/OUTPT VISIT, EST, LEVL III, 20-29 MIN: ICD-10-PCS | Mod: 25,S$GLB,, | Performed by: INTERNAL MEDICINE

## 2020-03-09 PROCEDURE — 3074F PR MOST RECENT SYSTOLIC BLOOD PRESSURE < 130 MM HG: ICD-10-PCS | Mod: CPTII,S$GLB,, | Performed by: INTERNAL MEDICINE

## 2020-03-09 PROCEDURE — 81002 POCT URINE DIPSTICK WITHOUT MICROSCOPE: ICD-10-PCS | Mod: S$GLB,,, | Performed by: INTERNAL MEDICINE

## 2020-03-09 PROCEDURE — 99999 PR PBB SHADOW E&M-EST. PATIENT-LVL III: ICD-10-PCS | Mod: PBBFAC,,, | Performed by: INTERNAL MEDICINE

## 2020-03-09 PROCEDURE — 1125F PR PAIN SEVERITY QUANTIFIED, PAIN PRESENT: ICD-10-PCS | Mod: S$GLB,,, | Performed by: INTERNAL MEDICINE

## 2020-03-09 PROCEDURE — 99999 PR PBB SHADOW E&M-EST. PATIENT-LVL III: CPT | Mod: PBBFAC,,, | Performed by: INTERNAL MEDICINE

## 2020-03-09 PROCEDURE — 3078F DIAST BP <80 MM HG: CPT | Mod: CPTII,S$GLB,, | Performed by: INTERNAL MEDICINE

## 2020-03-09 PROCEDURE — 1101F PR PT FALLS ASSESS DOC 0-1 FALLS W/OUT INJ PAST YR: ICD-10-PCS | Mod: CPTII,S$GLB,, | Performed by: INTERNAL MEDICINE

## 2020-03-09 PROCEDURE — 3074F SYST BP LT 130 MM HG: CPT | Mod: CPTII,S$GLB,, | Performed by: INTERNAL MEDICINE

## 2020-03-09 PROCEDURE — 1159F PR MEDICATION LIST DOCUMENTED IN MEDICAL RECORD: ICD-10-PCS | Mod: S$GLB,,, | Performed by: INTERNAL MEDICINE

## 2020-03-09 PROCEDURE — 1101F PT FALLS ASSESS-DOCD LE1/YR: CPT | Mod: CPTII,S$GLB,, | Performed by: INTERNAL MEDICINE

## 2020-03-09 PROCEDURE — 3078F PR MOST RECENT DIASTOLIC BLOOD PRESSURE < 80 MM HG: ICD-10-PCS | Mod: CPTII,S$GLB,, | Performed by: INTERNAL MEDICINE

## 2020-03-09 PROCEDURE — 1159F MED LIST DOCD IN RCRD: CPT | Mod: S$GLB,,, | Performed by: INTERNAL MEDICINE

## 2020-03-09 RX ORDER — CIPROFLOXACIN 500 MG/1
500 TABLET ORAL 2 TIMES DAILY
Qty: 20 TABLET | Refills: 0 | Status: SHIPPED | OUTPATIENT
Start: 2020-03-09 | End: 2020-04-06

## 2020-03-09 RX ORDER — PHENAZOPYRIDINE HYDROCHLORIDE 200 MG/1
200 TABLET, FILM COATED ORAL 2 TIMES DAILY PRN
Qty: 15 TABLET | Refills: 0 | Status: SHIPPED | OUTPATIENT
Start: 2020-03-09 | End: 2020-03-19

## 2020-03-09 NOTE — PROGRESS NOTES
Subjective:       Patient ID: Louis Bronson is a 74 y.o. female.    Chief Complaint: Urinary Tract Infection (x 5 days)    HPI pt is here with her daughter c/o see bright blood in urine on and off x 4 days and lower abd dyscomfort ank  lower back pain no fever chill no n/v no sob cp pt also has intermittent  Epigastric pain seen by GI no EGD yet but had alb  Review of Systems  s told stomach infection put on po abx still symptomatic but has seen blood since this am pt is not on any anticoagulants  Objective:since       Physical Exam   Constitutional: She is oriented to person, place, and time. She appears well-developed and well-nourished. No distress.   HENT:   Head: Normocephalic and atraumatic.   Right Ear: External ear normal.   Left Ear: External ear normal.   Nose: Nose normal.   Mouth/Throat: Oropharynx is clear and moist. No oropharyngeal exudate.   Eyes: Pupils are equal, round, and reactive to light. Conjunctivae and EOM are normal. Right eye exhibits no discharge. Left eye exhibits no discharge.   Neck: Normal range of motion. Neck supple. No thyromegaly present.   Cardiovascular: Normal rate, regular rhythm, normal heart sounds and intact distal pulses. Exam reveals no gallop and no friction rub.   No murmur heard.  Pulmonary/Chest: Effort normal and breath sounds normal. No respiratory distress. She has no wheezes. She has no rales. She exhibits no tenderness.   Abdominal: Soft. Bowel sounds are normal. She exhibits no distension. There is no tenderness. There is no rebound and no guarding.   Musculoskeletal: Normal range of motion. She exhibits no edema, tenderness or deformity.   Lymphadenopathy:     She has no cervical adenopathy.   Neurological: She is alert and oriented to person, place, and time.   Skin: Skin is warm and dry. Capillary refill takes less than 2 seconds. No rash noted. No erythema.   Psychiatric: She has a normal mood and affect. Judgment and thought content normal.   Nursing note  and vitals reviewed.      Assessment:       1. UTI symptoms    2. Cystitis    3. Allergic conjunctivitis of both eyes    4. Gross hematuria        Plan:       UTI symptoms  -     Cancel: POCT URINE DIPSTICK WITHOUT MICROSCOPE  -     POCT urine dipstick without microscope  -     ciprofloxacin HCl (CIPRO) 500 MG tablet; Take 1 tablet (500 mg total) by mouth 2 (two) times daily.  Dispense: 20 tablet; Refill: 0  -     phenazopyridine (PYRIDIUM) 200 MG tablet; Take 1 tablet (200 mg total) by mouth 2 (two) times daily as needed for Pain.  Dispense: 15 tablet; Refill: 0    Cystitis  -     POCT urine dipstick without microscope  -     ciprofloxacin HCl (CIPRO) 500 MG tablet; Take 1 tablet (500 mg total) by mouth 2 (two) times daily.  Dispense: 20 tablet; Refill: 0  -     phenazopyridine (PYRIDIUM) 200 MG tablet; Take 1 tablet (200 mg total) by mouth 2 (two) times daily as needed for Pain.  Dispense: 15 tablet; Refill: 0    Allergic conjunctivitis of both eyes  -     olopatadine (PAZEO) 0.7 % Drop; Place 1 drop into both eyes once daily.  Dispense: 2.5 mL; Refill: 2    Gross hematuria  Comments:  had ct scan abd pelvic kidneys were normal on CT scan5/19

## 2020-03-23 ENCOUNTER — CLINICAL SUPPORT (OUTPATIENT)
Dept: PRIMARY CARE CLINIC | Facility: CLINIC | Age: 75
End: 2020-03-23
Payer: MEDICARE

## 2020-03-23 DIAGNOSIS — R39.9 UTI SYMPTOMS: Primary | ICD-10-CM

## 2020-03-23 LAB
BILIRUB SERPL-MCNC: NORMAL MG/DL
BLOOD URINE, POC: NORMAL
COLOR, POC UA: YELLOW
GLUCOSE UR QL STRIP: NORMAL
KETONES UR QL STRIP: NORMAL
LEUKOCYTE ESTERASE URINE, POC: NORMAL
NITRITE, POC UA: NORMAL
PH, POC UA: 6
PROTEIN, POC: NORMAL
SPECIFIC GRAVITY, POC UA: 1.01
UROBILINOGEN, POC UA: NORMAL

## 2020-03-23 PROCEDURE — 81002 URINALYSIS NONAUTO W/O SCOPE: CPT | Mod: S$GLB,,, | Performed by: INTERNAL MEDICINE

## 2020-03-23 PROCEDURE — 81002 POCT URINE DIPSTICK WITHOUT MICROSCOPE: ICD-10-PCS | Mod: S$GLB,,, | Performed by: INTERNAL MEDICINE

## 2020-04-06 DIAGNOSIS — J30.89 CHRONIC NON-SEASONAL ALLERGIC RHINITIS: ICD-10-CM

## 2020-04-06 DIAGNOSIS — N30.90 CYSTITIS: ICD-10-CM

## 2020-04-06 DIAGNOSIS — E78.5 HYPERLIPIDEMIA, UNSPECIFIED HYPERLIPIDEMIA TYPE: ICD-10-CM

## 2020-04-06 DIAGNOSIS — R39.9 UTI SYMPTOMS: ICD-10-CM

## 2020-04-06 RX ORDER — ATORVASTATIN CALCIUM 20 MG/1
TABLET, FILM COATED ORAL
Qty: 90 TABLET | Refills: 0 | Status: SHIPPED | OUTPATIENT
Start: 2020-04-06 | End: 2020-08-18

## 2020-04-06 RX ORDER — LEVOCETIRIZINE DIHYDROCHLORIDE 5 MG/1
TABLET, FILM COATED ORAL
Qty: 30 TABLET | Refills: 1 | Status: SHIPPED | OUTPATIENT
Start: 2020-04-06 | End: 2020-04-27

## 2020-04-06 RX ORDER — CIPROFLOXACIN 500 MG/1
TABLET ORAL
Qty: 14 TABLET | Refills: 0 | Status: SHIPPED | OUTPATIENT
Start: 2020-04-06 | End: 2020-09-21

## 2020-04-25 DIAGNOSIS — J30.89 CHRONIC NON-SEASONAL ALLERGIC RHINITIS: ICD-10-CM

## 2020-04-27 RX ORDER — LEVOCETIRIZINE DIHYDROCHLORIDE 5 MG/1
5 TABLET, FILM COATED ORAL NIGHTLY PRN
Qty: 30 TABLET | Refills: 0 | Status: SHIPPED | OUTPATIENT
Start: 2020-04-27 | End: 2020-06-18

## 2020-05-21 DIAGNOSIS — I10 BENIGN HYPERTENSION: ICD-10-CM

## 2020-05-23 DIAGNOSIS — I10 BENIGN HYPERTENSION: ICD-10-CM

## 2020-05-23 DIAGNOSIS — K21.9 GASTROESOPHAGEAL REFLUX DISEASE, ESOPHAGITIS PRESENCE NOT SPECIFIED: ICD-10-CM

## 2020-05-25 RX ORDER — METOPROLOL SUCCINATE 25 MG/1
TABLET, EXTENDED RELEASE ORAL
Qty: 90 TABLET | Refills: 1 | Status: SHIPPED | OUTPATIENT
Start: 2020-05-25 | End: 2020-12-14

## 2020-05-25 RX ORDER — ESOMEPRAZOLE MAGNESIUM 40 MG/1
CAPSULE, DELAYED RELEASE ORAL
Qty: 90 CAPSULE | Refills: 1 | Status: SHIPPED | OUTPATIENT
Start: 2020-05-25 | End: 2020-10-22

## 2020-07-03 DIAGNOSIS — Z12.31 ENCOUNTER FOR SCREENING MAMMOGRAM FOR BREAST CANCER: ICD-10-CM

## 2020-08-18 DIAGNOSIS — J30.89 CHRONIC NON-SEASONAL ALLERGIC RHINITIS: ICD-10-CM

## 2020-08-18 DIAGNOSIS — E78.5 HYPERLIPIDEMIA, UNSPECIFIED HYPERLIPIDEMIA TYPE: ICD-10-CM

## 2020-08-18 RX ORDER — LEVOCETIRIZINE DIHYDROCHLORIDE 5 MG/1
TABLET, FILM COATED ORAL
Qty: 30 TABLET | Refills: 0 | Status: SHIPPED | OUTPATIENT
Start: 2020-08-18 | End: 2020-10-09

## 2020-08-18 RX ORDER — ATORVASTATIN CALCIUM 20 MG/1
TABLET, FILM COATED ORAL
Qty: 90 TABLET | Refills: 0 | Status: SHIPPED | OUTPATIENT
Start: 2020-08-18 | End: 2020-12-14

## 2020-08-18 NOTE — TELEPHONE ENCOUNTER
Spoke with pt. Daughter notified 2 rx's have been sent to the pharmacy - scheduled f/u apt. Same day as pt.  on 9/21. Both pt. Will arrive at 2:30

## 2020-08-25 ENCOUNTER — TELEPHONE (OUTPATIENT)
Dept: PRIMARY CARE CLINIC | Facility: CLINIC | Age: 75
End: 2020-08-25

## 2020-08-25 DIAGNOSIS — I10 BENIGN HYPERTENSION: Primary | ICD-10-CM

## 2020-08-25 DIAGNOSIS — E78.5 HYPERLIPIDEMIA, UNSPECIFIED HYPERLIPIDEMIA TYPE: ICD-10-CM

## 2020-08-25 NOTE — TELEPHONE ENCOUNTER
----- Message from Keyona Miller sent at 8/25/2020  9:18 AM CDT -----  Contact: flaco(daughter) [phone]  Pt daughter would like orders placed for a full comprehensive metabolic and lipid panel. Please call and advise once orders have been placed. Thank you

## 2020-09-21 ENCOUNTER — OFFICE VISIT (OUTPATIENT)
Dept: PRIMARY CARE CLINIC | Facility: CLINIC | Age: 75
End: 2020-09-21
Payer: MEDICARE

## 2020-09-21 VITALS
DIASTOLIC BLOOD PRESSURE: 58 MMHG | HEART RATE: 102 BPM | BODY MASS INDEX: 19.88 KG/M2 | SYSTOLIC BLOOD PRESSURE: 100 MMHG | HEIGHT: 62 IN | WEIGHT: 108 LBS | RESPIRATION RATE: 16 BRPM | TEMPERATURE: 99 F | OXYGEN SATURATION: 97 %

## 2020-09-21 DIAGNOSIS — H10.13 ALLERGIC CONJUNCTIVITIS OF BOTH EYES: ICD-10-CM

## 2020-09-21 DIAGNOSIS — N89.8 VAGINAL DRYNESS: ICD-10-CM

## 2020-09-21 DIAGNOSIS — N30.00 ACUTE CYSTITIS WITHOUT HEMATURIA: ICD-10-CM

## 2020-09-21 DIAGNOSIS — Z23 ENCOUNTER FOR VACCINATION: ICD-10-CM

## 2020-09-21 DIAGNOSIS — U07.1 COVID-19 VIRUS INFECTION: ICD-10-CM

## 2020-09-21 DIAGNOSIS — R53.83 FATIGUE, UNSPECIFIED TYPE: Primary | ICD-10-CM

## 2020-09-21 DIAGNOSIS — I10 BENIGN HYPERTENSION: ICD-10-CM

## 2020-09-21 PROCEDURE — 96372 PR INJECTION,THERAP/PROPH/DIAG2ST, IM OR SUBCUT: ICD-10-PCS | Mod: S$GLB,,, | Performed by: INTERNAL MEDICINE

## 2020-09-21 PROCEDURE — 99499 RISK ADDL DX/OHS AUDIT: ICD-10-PCS | Mod: S$GLB,,, | Performed by: INTERNAL MEDICINE

## 2020-09-21 PROCEDURE — 3078F DIAST BP <80 MM HG: CPT | Mod: CPTII,S$GLB,, | Performed by: INTERNAL MEDICINE

## 2020-09-21 PROCEDURE — 99214 PR OFFICE/OUTPT VISIT, EST, LEVL IV, 30-39 MIN: ICD-10-PCS | Mod: 25,S$GLB,, | Performed by: INTERNAL MEDICINE

## 2020-09-21 PROCEDURE — 1126F AMNT PAIN NOTED NONE PRSNT: CPT | Mod: S$GLB,,, | Performed by: INTERNAL MEDICINE

## 2020-09-21 PROCEDURE — 96372 THER/PROPH/DIAG INJ SC/IM: CPT | Mod: S$GLB,,, | Performed by: INTERNAL MEDICINE

## 2020-09-21 PROCEDURE — 99999 PR PBB SHADOW E&M-EST. PATIENT-LVL IV: ICD-10-PCS | Mod: PBBFAC,,, | Performed by: INTERNAL MEDICINE

## 2020-09-21 PROCEDURE — 1159F MED LIST DOCD IN RCRD: CPT | Mod: S$GLB,,, | Performed by: INTERNAL MEDICINE

## 2020-09-21 PROCEDURE — 3078F PR MOST RECENT DIASTOLIC BLOOD PRESSURE < 80 MM HG: ICD-10-PCS | Mod: CPTII,S$GLB,, | Performed by: INTERNAL MEDICINE

## 2020-09-21 PROCEDURE — 1101F PT FALLS ASSESS-DOCD LE1/YR: CPT | Mod: CPTII,S$GLB,, | Performed by: INTERNAL MEDICINE

## 2020-09-21 PROCEDURE — 99499 UNLISTED E&M SERVICE: CPT | Mod: S$GLB,,, | Performed by: INTERNAL MEDICINE

## 2020-09-21 PROCEDURE — 1126F PR PAIN SEVERITY QUANTIFIED, NO PAIN PRESENT: ICD-10-PCS | Mod: S$GLB,,, | Performed by: INTERNAL MEDICINE

## 2020-09-21 PROCEDURE — 1101F PR PT FALLS ASSESS DOC 0-1 FALLS W/OUT INJ PAST YR: ICD-10-PCS | Mod: CPTII,S$GLB,, | Performed by: INTERNAL MEDICINE

## 2020-09-21 PROCEDURE — 99999 PR PBB SHADOW E&M-EST. PATIENT-LVL IV: CPT | Mod: PBBFAC,,, | Performed by: INTERNAL MEDICINE

## 2020-09-21 PROCEDURE — 3074F PR MOST RECENT SYSTOLIC BLOOD PRESSURE < 130 MM HG: ICD-10-PCS | Mod: CPTII,S$GLB,, | Performed by: INTERNAL MEDICINE

## 2020-09-21 PROCEDURE — 99214 OFFICE O/P EST MOD 30 MIN: CPT | Mod: 25,S$GLB,, | Performed by: INTERNAL MEDICINE

## 2020-09-21 PROCEDURE — 3074F SYST BP LT 130 MM HG: CPT | Mod: CPTII,S$GLB,, | Performed by: INTERNAL MEDICINE

## 2020-09-21 PROCEDURE — 1159F PR MEDICATION LIST DOCUMENTED IN MEDICAL RECORD: ICD-10-PCS | Mod: S$GLB,,, | Performed by: INTERNAL MEDICINE

## 2020-09-21 RX ORDER — CIPROFLOXACIN 500 MG/1
500 TABLET ORAL 2 TIMES DAILY
Qty: 20 TABLET | Refills: 0 | Status: SHIPPED | OUTPATIENT
Start: 2020-09-21 | End: 2021-03-17

## 2020-09-21 RX ORDER — CYANOCOBALAMIN 1000 UG/ML
2000 INJECTION, SOLUTION INTRAMUSCULAR; SUBCUTANEOUS ONCE
Status: COMPLETED | OUTPATIENT
Start: 2020-09-21 | End: 2020-09-21

## 2020-09-21 RX ORDER — ZOSTER VACCINE RECOMBINANT, ADJUVANTED 50 MCG/0.5
0.5 KIT INTRAMUSCULAR ONCE
Qty: 1 EACH | Refills: 0 | Status: SHIPPED | OUTPATIENT
Start: 2020-09-21 | End: 2020-09-21

## 2020-09-21 RX ORDER — ESTRADIOL 0.1 MG/G
CREAM VAGINAL
Qty: 43 G | Refills: 2 | Status: SHIPPED | OUTPATIENT
Start: 2020-09-21 | End: 2022-03-15

## 2020-09-21 RX ADMIN — CYANOCOBALAMIN 2000 MCG: 1000 INJECTION, SOLUTION INTRAMUSCULAR; SUBCUTANEOUS at 04:09

## 2020-09-21 NOTE — PROGRESS NOTES
Subjective:       Patient ID: Louis Bronson is a 75 y.o. female.    Chief Complaint: Follow-up (review labs)    HPI  Pt vsist today for f/u she ahs c/o UTI symptoms on and off better lats time with cipro request refill to be used prn she also ha svag dryness seen by GYN estrogen vag cream prescribe better and her rt shoulder hurt sometime after working in her veg garden no sob cp no DORSEY no wt loss or gain no change in bowel habbit or urination no fever chill no n/v/d pt had covid 19 infection but fully recovered  Review of Systems    Objective:      Physical Exam  Vitals signs and nursing note reviewed.   Constitutional:       General: She is not in acute distress.     Appearance: She is well-developed.   HENT:      Head: Normocephalic and atraumatic.      Right Ear: Ear canal and external ear normal.      Left Ear: Ear canal and external ear normal.      Nose: Nose normal.      Mouth/Throat:      Pharynx: No oropharyngeal exudate.   Eyes:      Extraocular Movements: Extraocular movements intact.      Conjunctiva/sclera: Conjunctivae normal.      Pupils: Pupils are equal, round, and reactive to light.      Comments: Edema lower eye lids   Neck:      Musculoskeletal: Normal range of motion and neck supple.      Thyroid: No thyromegaly.   Cardiovascular:      Rate and Rhythm: Normal rate and regular rhythm.      Heart sounds: Normal heart sounds. No murmur. No friction rub. No gallop.    Pulmonary:      Effort: Pulmonary effort is normal. No respiratory distress.      Breath sounds: Normal breath sounds. No wheezing.   Abdominal:      General: Bowel sounds are normal. There is no distension.      Palpations: Abdomen is soft.      Tenderness: There is no abdominal tenderness.   Musculoskeletal: Normal range of motion.         General: No tenderness or deformity.   Lymphadenopathy:      Cervical: No cervical adenopathy.   Skin:     General: Skin is warm and dry.      Findings: No erythema or rash.   Neurological:       General: No focal deficit present.      Mental Status: She is alert and oriented to person, place, and time.   Psychiatric:         Mood and Affect: Mood normal.         Thought Content: Thought content normal.         Judgment: Judgment normal.         Assessment:       1. Fatigue, unspecified type    2. Allergic conjunctivitis of both eyes    3. Vaginal dryness    4. Acute cystitis without hematuria    5. Encounter for vaccination    6. Benign hypertension    7. COVID-19 virus infection        Plan:       Fatigue, unspecified type  -     cyanocobalamin injection 2,000 mcg    Allergic conjunctivitis of both eyes  -     olopatadine (PAZEO) 0.7 % Drop; Place 1 drop into both eyes once daily.  Dispense: 2.5 mL; Refill: 2    Vaginal dryness  -     estradioL (ESTRACE) 0.01 % (0.1 mg/gram) vaginal cream; Apply vag 2 x a week  Dispense: 43 g; Refill: 2    Acute cystitis without hematuria  -     ciprofloxacin HCl (CIPRO) 500 MG tablet; Take 1 tablet (500 mg total) by mouth 2 (two) times daily.  Dispense: 20 tablet; Refill: 0    Encounter for vaccination  -     varicella-zoster gE-AS01B, PF, (SHINGRIX, PF,) 50 mcg/0.5 mL injection; Inject 0.5 mLs into the muscle once. for 1 dose  Dispense: 1 each; Refill: 0    Benign hypertension  Comments:  stable on medications    COVID-19 virus infection  Comments:  no complication except fatigue        Medication List with Changes/Refills   New Medications    CIPROFLOXACIN HCL (CIPRO) 500 MG TABLET    Take 1 tablet (500 mg total) by mouth 2 (two) times daily.    VARICELLA-ZOSTER GE-AS01B, PF, (SHINGRIX, PF,) 50 MCG/0.5 ML INJECTION    Inject 0.5 mLs into the muscle once. for 1 dose   Current Medications    ATORVASTATIN (LIPITOR) 20 MG TABLET    Take 1 tablet by mouth once daily    ESOMEPRAZOLE (NEXIUM) 40 MG CAPSULE    TAKE 1 CAPSULE BY MOUTH ONCE DAILY BEFORE BREAKFAST    LEVOCETIRIZINE (XYZAL) 5 MG TABLET    TAKE 1 TABLET BY MOUTH NIGHTLY AS NEEDED FOR ALLERGIES    METOPROLOL  SUCCINATE (TOPROL-XL) 25 MG 24 HR TABLET    Take 1 tablet by mouth once daily   Changed and/or Refilled Medications    Modified Medication Previous Medication    ESTRADIOL (ESTRACE) 0.01 % (0.1 MG/GRAM) VAGINAL CREAM estradiol (ESTRACE) 0.01 % (0.1 mg/gram) vaginal cream       Apply vag 2 x a week    APPLY A DIME SIZED AMOUNT WEEKLY    OLOPATADINE (PAZEO) 0.7 % DROP olopatadine (PAZEO) 0.7 % Drop       Place 1 drop into both eyes once daily.    Place 1 drop into both eyes once daily.   Discontinued Medications    CIPROFLOXACIN HCL (CIPRO) 500 MG TABLET    Take 1 tablet by mouth twice daily

## 2020-09-21 NOTE — PROGRESS NOTES
Verified pt ID using name and . Allergies verified with pt daughter. Administered B-12 2,000 mcg IM in Left VG per physician order using aseptic technique. Aspirated and no blood return noted. Pt tolerated well with no adverse reactions noted.

## 2020-12-14 ENCOUNTER — OFFICE VISIT (OUTPATIENT)
Dept: PRIMARY CARE CLINIC | Facility: CLINIC | Age: 75
End: 2020-12-14
Payer: MEDICARE

## 2020-12-14 VITALS
HEIGHT: 62 IN | HEART RATE: 102 BPM | WEIGHT: 107.5 LBS | SYSTOLIC BLOOD PRESSURE: 102 MMHG | TEMPERATURE: 98 F | RESPIRATION RATE: 18 BRPM | OXYGEN SATURATION: 100 % | DIASTOLIC BLOOD PRESSURE: 62 MMHG | BODY MASS INDEX: 19.78 KG/M2

## 2020-12-14 DIAGNOSIS — L50.9 URTICARIA: ICD-10-CM

## 2020-12-14 DIAGNOSIS — Z23 ENCOUNTER FOR VACCINATION: ICD-10-CM

## 2020-12-14 DIAGNOSIS — E78.5 HYPERLIPIDEMIA, UNSPECIFIED HYPERLIPIDEMIA TYPE: ICD-10-CM

## 2020-12-14 DIAGNOSIS — M75.51 BURSITIS OF RIGHT SHOULDER: Primary | ICD-10-CM

## 2020-12-14 PROCEDURE — 1126F PR PAIN SEVERITY QUANTIFIED, NO PAIN PRESENT: ICD-10-PCS | Mod: S$GLB,,, | Performed by: INTERNAL MEDICINE

## 2020-12-14 PROCEDURE — 99499 UNLISTED E&M SERVICE: CPT | Mod: S$GLB,,, | Performed by: INTERNAL MEDICINE

## 2020-12-14 PROCEDURE — 1159F MED LIST DOCD IN RCRD: CPT | Mod: S$GLB,,, | Performed by: INTERNAL MEDICINE

## 2020-12-14 PROCEDURE — 3074F PR MOST RECENT SYSTOLIC BLOOD PRESSURE < 130 MM HG: ICD-10-PCS | Mod: CPTII,S$GLB,, | Performed by: INTERNAL MEDICINE

## 2020-12-14 PROCEDURE — G0009 PNEUMOCOCCAL POLYSACCHARIDE VACCINE 23-VALENT =>2YO SQ IM: ICD-10-PCS | Mod: S$GLB,,, | Performed by: INTERNAL MEDICINE

## 2020-12-14 PROCEDURE — 99499 RISK ADDL DX/OHS AUDIT: ICD-10-PCS | Mod: S$GLB,,, | Performed by: INTERNAL MEDICINE

## 2020-12-14 PROCEDURE — 99999 PR PBB SHADOW E&M-EST. PATIENT-LVL IV: ICD-10-PCS | Mod: PBBFAC,,, | Performed by: INTERNAL MEDICINE

## 2020-12-14 PROCEDURE — 1101F PT FALLS ASSESS-DOCD LE1/YR: CPT | Mod: CPTII,S$GLB,, | Performed by: INTERNAL MEDICINE

## 2020-12-14 PROCEDURE — 1126F AMNT PAIN NOTED NONE PRSNT: CPT | Mod: S$GLB,,, | Performed by: INTERNAL MEDICINE

## 2020-12-14 PROCEDURE — 99213 OFFICE O/P EST LOW 20 MIN: CPT | Mod: 25,S$GLB,, | Performed by: INTERNAL MEDICINE

## 2020-12-14 PROCEDURE — 99999 PR PBB SHADOW E&M-EST. PATIENT-LVL IV: CPT | Mod: PBBFAC,,, | Performed by: INTERNAL MEDICINE

## 2020-12-14 PROCEDURE — 3074F SYST BP LT 130 MM HG: CPT | Mod: CPTII,S$GLB,, | Performed by: INTERNAL MEDICINE

## 2020-12-14 PROCEDURE — 3288F FALL RISK ASSESSMENT DOCD: CPT | Mod: CPTII,S$GLB,, | Performed by: INTERNAL MEDICINE

## 2020-12-14 PROCEDURE — 99213 PR OFFICE/OUTPT VISIT, EST, LEVL III, 20-29 MIN: ICD-10-PCS | Mod: 25,S$GLB,, | Performed by: INTERNAL MEDICINE

## 2020-12-14 PROCEDURE — 3288F PR FALLS RISK ASSESSMENT DOCUMENTED: ICD-10-PCS | Mod: CPTII,S$GLB,, | Performed by: INTERNAL MEDICINE

## 2020-12-14 PROCEDURE — 3078F PR MOST RECENT DIASTOLIC BLOOD PRESSURE < 80 MM HG: ICD-10-PCS | Mod: CPTII,S$GLB,, | Performed by: INTERNAL MEDICINE

## 2020-12-14 PROCEDURE — 90732 PNEUMOCOCCAL POLYSACCHARIDE VACCINE 23-VALENT =>2YO SQ IM: ICD-10-PCS | Mod: S$GLB,,, | Performed by: INTERNAL MEDICINE

## 2020-12-14 PROCEDURE — 3078F DIAST BP <80 MM HG: CPT | Mod: CPTII,S$GLB,, | Performed by: INTERNAL MEDICINE

## 2020-12-14 PROCEDURE — 1159F PR MEDICATION LIST DOCUMENTED IN MEDICAL RECORD: ICD-10-PCS | Mod: S$GLB,,, | Performed by: INTERNAL MEDICINE

## 2020-12-14 PROCEDURE — G0009 ADMIN PNEUMOCOCCAL VACCINE: HCPCS | Mod: S$GLB,,, | Performed by: INTERNAL MEDICINE

## 2020-12-14 PROCEDURE — 1101F PR PT FALLS ASSESS DOC 0-1 FALLS W/OUT INJ PAST YR: ICD-10-PCS | Mod: CPTII,S$GLB,, | Performed by: INTERNAL MEDICINE

## 2020-12-14 PROCEDURE — 90732 PPSV23 VACC 2 YRS+ SUBQ/IM: CPT | Mod: S$GLB,,, | Performed by: INTERNAL MEDICINE

## 2020-12-14 RX ORDER — DICLOFENAC SODIUM 10 MG/G
2 GEL TOPICAL 3 TIMES DAILY
Qty: 200 G | Refills: 2 | Status: SHIPPED | OUTPATIENT
Start: 2020-12-14 | End: 2021-04-06

## 2020-12-14 RX ORDER — PREDNISONE 20 MG/1
20 TABLET ORAL 2 TIMES DAILY
Qty: 10 TABLET | Refills: 0 | Status: SHIPPED | OUTPATIENT
Start: 2020-12-14 | End: 2020-12-19

## 2020-12-14 RX ORDER — ZOSTER VACCINE RECOMBINANT, ADJUVANTED 50 MCG/0.5
0.5 KIT INTRAMUSCULAR ONCE
Qty: 1 EACH | Refills: 0 | Status: SHIPPED | OUTPATIENT
Start: 2020-12-14 | End: 2020-12-14

## 2020-12-14 NOTE — PROGRESS NOTES
Verified pt ID using name and . NKDA. Administered Pneumococcal Polysaccharide - 23 Valent 0.5ML in left deltoid per physician order using aseptic technique. Aspirated and no blood return noted. Pt tolerated well with no adverse reactions noted.

## 2020-12-14 NOTE — PROGRESS NOTES
Subjective:       Patient ID: Louis Bronson is a 75 y.o. female.    Chief Complaint: Follow-up (3 month follow up)    HPI  patient visit today for routine follow-up she also complained of her right shoulder right arm has been hurting worse than when see try to liver shoulder or try to scratch her back she has been working outside in her garden every day and pulling weeds she deny any history of trauma she denies short of breath chest dyspnea with exertion she also complained of the itching skin rash sometime when she eats certain foods has been trying to avoid them and she reported that she had EGD done twice with Dr. Jose F resendiz X by Nico infection and was treated with oral antibiotic she currently taking Nexium 40 mg 1 a day and as needed she deny any other symptom no change in bowel habit or urination no weight gain weight loss.  And patient refused to do any further mammogram her she had no symptoms  Review of Systems    Objective:      Physical Exam  Vitals signs and nursing note reviewed.   Constitutional:       General: She is not in acute distress.     Appearance: She is well-developed.   HENT:      Head: Normocephalic and atraumatic.      Right Ear: External ear normal.      Left Ear: External ear normal.      Nose: Nose normal.      Mouth/Throat:      Pharynx: No oropharyngeal exudate.   Eyes:      Extraocular Movements: Extraocular movements intact.      Conjunctiva/sclera: Conjunctivae normal.      Pupils: Pupils are equal, round, and reactive to light.   Neck:      Musculoskeletal: Normal range of motion and neck supple.      Thyroid: No thyromegaly.   Cardiovascular:      Rate and Rhythm: Normal rate and regular rhythm.      Heart sounds: Normal heart sounds. No murmur. No friction rub. No gallop.    Pulmonary:      Effort: Pulmonary effort is normal. No respiratory distress.      Breath sounds: Normal breath sounds. No wheezing or rales.   Chest:      Chest wall: No tenderness.   Abdominal:       General: Bowel sounds are normal. There is no distension.      Palpations: Abdomen is soft.      Tenderness: There is no abdominal tenderness. There is no guarding or rebound.   Musculoskeletal: Normal range of motion.         General: Tenderness (Subjective tenderness anterior to the right shoulder joint and anterior right arm with palpation no swelling no erythema decreased abduction extension conceivably due to pain) present. No deformity.   Lymphadenopathy:      Cervical: No cervical adenopathy.   Skin:     General: Skin is warm and dry.      Findings: No erythema or rash.   Neurological:      Mental Status: She is alert and oriented to person, place, and time.   Psychiatric:         Mood and Affect: Mood normal.         Thought Content: Thought content normal.         Judgment: Judgment normal.         Assessment:       1. Bursitis of right shoulder    2. Encounter for vaccination    3. Urticaria    4. Hyperlipidemia, unspecified hyperlipidemia type        Plan:       Bursitis of right shoulder  Comments:  Consider steroid injection if not better or physical therapy and xray  Orders:  -     predniSONE (DELTASONE) 20 MG tablet; Take 1 tablet (20 mg total) by mouth 2 (two) times daily. for 5 days  Dispense: 10 tablet; Refill: 0  -     diclofenac sodium (VOLTAREN) 1 % Gel; Apply 2 g topically 3 (three) times daily.  Dispense: 200 g; Refill: 2    Encounter for vaccination  -     varicella-zoster gE-AS01B, PF, (SHINGRIX, PF,) 50 mcg/0.5 mL injection; Inject 0.5 mLs into the muscle once. for 1 dose  Dispense: 1 each; Refill: 0  -     Pneumococcal Polysaccharide Vaccine (23 Valent) (SQ/IM)    Urticaria  -     predniSONE (DELTASONE) 20 MG tablet; Take 1 tablet (20 mg total) by mouth 2 (two) times daily. for 5 days  Dispense: 10 tablet; Refill: 0  -     CBC Auto Differential; Future; Expected date: 12/14/2020  -     Comprehensive Metabolic Panel; Future; Expected date: 12/14/2020    Hyperlipidemia, unspecified  hyperlipidemia type  -     Lipid Panel; Future; Expected date: 12/14/2020        Medication List with Changes/Refills   New Medications    DICLOFENAC SODIUM (VOLTAREN) 1 % GEL    Apply 2 g topically 3 (three) times daily.    PREDNISONE (DELTASONE) 20 MG TABLET    Take 1 tablet (20 mg total) by mouth 2 (two) times daily. for 5 days    VARICELLA-ZOSTER GE-AS01B, PF, (SHINGRIX, PF,) 50 MCG/0.5 ML INJECTION    Inject 0.5 mLs into the muscle once. for 1 dose   Current Medications    ATORVASTATIN (LIPITOR) 20 MG TABLET    Take 1 tablet by mouth once daily    CIPROFLOXACIN HCL (CIPRO) 500 MG TABLET    Take 1 tablet (500 mg total) by mouth 2 (two) times daily.    ESOMEPRAZOLE (NEXIUM) 40 MG CAPSULE    TAKE 1 CAPSULE BY MOUTH ONCE DAILY BEFORE BREAKFAST    ESTRADIOL (ESTRACE) 0.01 % (0.1 MG/GRAM) VAGINAL CREAM    Apply vag 2 x a week    LEVOCETIRIZINE (XYZAL) 5 MG TABLET    TAKE 1 TABLET BY MOUTH NIGHTLY AS NEEDED FOR  ALLERGIES    METOPROLOL SUCCINATE (TOPROL-XL) 25 MG 24 HR TABLET    Take 1 tablet by mouth once daily    OLOPATADINE (PAZEO) 0.7 % DROP    Place 1 drop into both eyes once daily.

## 2021-02-08 ENCOUNTER — LAB VISIT (OUTPATIENT)
Dept: PRIMARY CARE CLINIC | Facility: CLINIC | Age: 76
End: 2021-02-08
Payer: MEDICARE

## 2021-02-08 DIAGNOSIS — E78.5 HYPERLIPIDEMIA, UNSPECIFIED HYPERLIPIDEMIA TYPE: ICD-10-CM

## 2021-02-08 DIAGNOSIS — L50.9 URTICARIA: ICD-10-CM

## 2021-02-08 LAB
ALBUMIN SERPL BCP-MCNC: 3.6 G/DL (ref 3.5–5.2)
ALP SERPL-CCNC: 85 U/L (ref 55–135)
ALT SERPL W/O P-5'-P-CCNC: 31 U/L (ref 10–44)
ANION GAP SERPL CALC-SCNC: 7 MMOL/L (ref 8–16)
AST SERPL-CCNC: 31 U/L (ref 10–40)
BASOPHILS # BLD AUTO: 0.03 K/UL (ref 0–0.2)
BASOPHILS NFR BLD: 0.7 % (ref 0–1.9)
BILIRUB SERPL-MCNC: 0.7 MG/DL (ref 0.1–1)
BUN SERPL-MCNC: 15 MG/DL (ref 8–23)
CALCIUM SERPL-MCNC: 8.7 MG/DL (ref 8.7–10.5)
CHLORIDE SERPL-SCNC: 106 MMOL/L (ref 95–110)
CHOLEST SERPL-MCNC: 152 MG/DL (ref 120–199)
CHOLEST/HDLC SERPL: 2.8 {RATIO} (ref 2–5)
CO2 SERPL-SCNC: 29 MMOL/L (ref 23–29)
CREAT SERPL-MCNC: 0.8 MG/DL (ref 0.5–1.4)
DIFFERENTIAL METHOD: ABNORMAL
EOSINOPHIL # BLD AUTO: 0.2 K/UL (ref 0–0.5)
EOSINOPHIL NFR BLD: 4.9 % (ref 0–8)
ERYTHROCYTE [DISTWIDTH] IN BLOOD BY AUTOMATED COUNT: 13.5 % (ref 11.5–14.5)
EST. GFR  (AFRICAN AMERICAN): >60 ML/MIN/1.73 M^2
EST. GFR  (NON AFRICAN AMERICAN): >60 ML/MIN/1.73 M^2
GLUCOSE SERPL-MCNC: 95 MG/DL (ref 70–110)
HCT VFR BLD AUTO: 41.9 % (ref 37–48.5)
HDLC SERPL-MCNC: 55 MG/DL (ref 40–75)
HDLC SERPL: 36.2 % (ref 20–50)
HGB BLD-MCNC: 13.3 G/DL (ref 12–16)
IMM GRANULOCYTES # BLD AUTO: 0.01 K/UL (ref 0–0.04)
IMM GRANULOCYTES NFR BLD AUTO: 0.2 % (ref 0–0.5)
LDLC SERPL CALC-MCNC: 76.8 MG/DL (ref 63–159)
LYMPHOCYTES # BLD AUTO: 2 K/UL (ref 1–4.8)
LYMPHOCYTES NFR BLD: 43.9 % (ref 18–48)
MCH RBC QN AUTO: 31.4 PG (ref 27–31)
MCHC RBC AUTO-ENTMCNC: 31.7 G/DL (ref 32–36)
MCV RBC AUTO: 99 FL (ref 82–98)
MONOCYTES # BLD AUTO: 0.4 K/UL (ref 0.3–1)
MONOCYTES NFR BLD: 8 % (ref 4–15)
NEUTROPHILS # BLD AUTO: 1.9 K/UL (ref 1.8–7.7)
NEUTROPHILS NFR BLD: 42.3 % (ref 38–73)
NONHDLC SERPL-MCNC: 97 MG/DL
NRBC BLD-RTO: 0 /100 WBC
PLATELET # BLD AUTO: 223 K/UL (ref 150–350)
PMV BLD AUTO: 9.7 FL (ref 9.2–12.9)
POTASSIUM SERPL-SCNC: 4.3 MMOL/L (ref 3.5–5.1)
PROT SERPL-MCNC: 6.7 G/DL (ref 6–8.4)
RBC # BLD AUTO: 4.24 M/UL (ref 4–5.4)
SODIUM SERPL-SCNC: 142 MMOL/L (ref 136–145)
TRIGL SERPL-MCNC: 101 MG/DL (ref 30–150)
WBC # BLD AUTO: 4.49 K/UL (ref 3.9–12.7)

## 2021-02-08 PROCEDURE — 36415 PR COLLECTION VENOUS BLOOD,VENIPUNCTURE: ICD-10-PCS | Mod: S$GLB,,, | Performed by: INTERNAL MEDICINE

## 2021-02-08 PROCEDURE — 80061 LIPID PANEL: CPT

## 2021-02-08 PROCEDURE — 85025 COMPLETE CBC W/AUTO DIFF WBC: CPT

## 2021-02-08 PROCEDURE — 80053 COMPREHEN METABOLIC PANEL: CPT

## 2021-02-08 PROCEDURE — 36415 COLL VENOUS BLD VENIPUNCTURE: CPT

## 2021-02-08 PROCEDURE — 36415 COLL VENOUS BLD VENIPUNCTURE: CPT | Mod: S$GLB,,, | Performed by: INTERNAL MEDICINE

## 2021-03-16 ENCOUNTER — PATIENT OUTREACH (OUTPATIENT)
Dept: ADMINISTRATIVE | Facility: HOSPITAL | Age: 76
End: 2021-03-16

## 2021-03-17 ENCOUNTER — OFFICE VISIT (OUTPATIENT)
Dept: PRIMARY CARE CLINIC | Facility: CLINIC | Age: 76
End: 2021-03-17
Payer: MEDICARE

## 2021-03-17 VITALS
DIASTOLIC BLOOD PRESSURE: 62 MMHG | WEIGHT: 110.56 LBS | RESPIRATION RATE: 18 BRPM | SYSTOLIC BLOOD PRESSURE: 94 MMHG | HEIGHT: 62 IN | TEMPERATURE: 98 F | HEART RATE: 97 BPM | BODY MASS INDEX: 20.35 KG/M2 | OXYGEN SATURATION: 99 %

## 2021-03-17 DIAGNOSIS — E78.5 HYPERLIPIDEMIA, UNSPECIFIED HYPERLIPIDEMIA TYPE: ICD-10-CM

## 2021-03-17 DIAGNOSIS — A04.8 HELICOBACTER PYLORI INFECTION: Primary | ICD-10-CM

## 2021-03-17 DIAGNOSIS — I10 BENIGN HYPERTENSION: ICD-10-CM

## 2021-03-17 PROCEDURE — 1101F PT FALLS ASSESS-DOCD LE1/YR: CPT | Mod: CPTII,S$GLB,, | Performed by: INTERNAL MEDICINE

## 2021-03-17 PROCEDURE — 99213 OFFICE O/P EST LOW 20 MIN: CPT | Mod: S$GLB,,, | Performed by: INTERNAL MEDICINE

## 2021-03-17 PROCEDURE — 3074F PR MOST RECENT SYSTOLIC BLOOD PRESSURE < 130 MM HG: ICD-10-PCS | Mod: CPTII,S$GLB,, | Performed by: INTERNAL MEDICINE

## 2021-03-17 PROCEDURE — 3288F PR FALLS RISK ASSESSMENT DOCUMENTED: ICD-10-PCS | Mod: CPTII,S$GLB,, | Performed by: INTERNAL MEDICINE

## 2021-03-17 PROCEDURE — 1125F PR PAIN SEVERITY QUANTIFIED, PAIN PRESENT: ICD-10-PCS | Mod: S$GLB,,, | Performed by: INTERNAL MEDICINE

## 2021-03-17 PROCEDURE — 1159F PR MEDICATION LIST DOCUMENTED IN MEDICAL RECORD: ICD-10-PCS | Mod: S$GLB,,, | Performed by: INTERNAL MEDICINE

## 2021-03-17 PROCEDURE — 3078F PR MOST RECENT DIASTOLIC BLOOD PRESSURE < 80 MM HG: ICD-10-PCS | Mod: CPTII,S$GLB,, | Performed by: INTERNAL MEDICINE

## 2021-03-17 PROCEDURE — 99499 RISK ADDL DX/OHS AUDIT: ICD-10-PCS | Mod: S$GLB,,, | Performed by: INTERNAL MEDICINE

## 2021-03-17 PROCEDURE — 99999 PR PBB SHADOW E&M-EST. PATIENT-LVL V: CPT | Mod: PBBFAC,,, | Performed by: INTERNAL MEDICINE

## 2021-03-17 PROCEDURE — 99999 PR PBB SHADOW E&M-EST. PATIENT-LVL V: ICD-10-PCS | Mod: PBBFAC,,, | Performed by: INTERNAL MEDICINE

## 2021-03-17 PROCEDURE — 1101F PR PT FALLS ASSESS DOC 0-1 FALLS W/OUT INJ PAST YR: ICD-10-PCS | Mod: CPTII,S$GLB,, | Performed by: INTERNAL MEDICINE

## 2021-03-17 PROCEDURE — 3074F SYST BP LT 130 MM HG: CPT | Mod: CPTII,S$GLB,, | Performed by: INTERNAL MEDICINE

## 2021-03-17 PROCEDURE — 3288F FALL RISK ASSESSMENT DOCD: CPT | Mod: CPTII,S$GLB,, | Performed by: INTERNAL MEDICINE

## 2021-03-17 PROCEDURE — 1125F AMNT PAIN NOTED PAIN PRSNT: CPT | Mod: S$GLB,,, | Performed by: INTERNAL MEDICINE

## 2021-03-17 PROCEDURE — 3078F DIAST BP <80 MM HG: CPT | Mod: CPTII,S$GLB,, | Performed by: INTERNAL MEDICINE

## 2021-03-17 PROCEDURE — 1159F MED LIST DOCD IN RCRD: CPT | Mod: S$GLB,,, | Performed by: INTERNAL MEDICINE

## 2021-03-17 PROCEDURE — 99213 PR OFFICE/OUTPT VISIT, EST, LEVL III, 20-29 MIN: ICD-10-PCS | Mod: S$GLB,,, | Performed by: INTERNAL MEDICINE

## 2021-03-17 PROCEDURE — 99499 UNLISTED E&M SERVICE: CPT | Mod: S$GLB,,, | Performed by: INTERNAL MEDICINE

## 2021-03-30 ENCOUNTER — TELEPHONE (OUTPATIENT)
Dept: PRIMARY CARE CLINIC | Facility: CLINIC | Age: 76
End: 2021-03-30

## 2021-03-30 DIAGNOSIS — M75.51 BURSITIS OF RIGHT SHOULDER: Primary | ICD-10-CM

## 2021-04-06 ENCOUNTER — OFFICE VISIT (OUTPATIENT)
Dept: PRIMARY CARE CLINIC | Facility: CLINIC | Age: 76
End: 2021-04-06
Payer: MEDICARE

## 2021-04-06 DIAGNOSIS — M75.51 BURSITIS OF RIGHT SHOULDER: ICD-10-CM

## 2021-04-06 DIAGNOSIS — M54.2 PAIN RADIATING TO NECK: Primary | ICD-10-CM

## 2021-04-06 PROCEDURE — 99442 PR PHYSICIAN TELEPHONE EVALUATION 11-20 MIN: ICD-10-PCS | Mod: 95,,, | Performed by: INTERNAL MEDICINE

## 2021-04-06 PROCEDURE — 1159F MED LIST DOCD IN RCRD: CPT | Mod: 95,,, | Performed by: INTERNAL MEDICINE

## 2021-04-06 PROCEDURE — 99442 PR PHYSICIAN TELEPHONE EVALUATION 11-20 MIN: CPT | Mod: 95,,, | Performed by: INTERNAL MEDICINE

## 2021-04-06 PROCEDURE — 1159F PR MEDICATION LIST DOCUMENTED IN MEDICAL RECORD: ICD-10-PCS | Mod: 95,,, | Performed by: INTERNAL MEDICINE

## 2021-04-06 RX ORDER — MELOXICAM 7.5 MG/1
7.5 TABLET ORAL DAILY
Qty: 30 TABLET | Refills: 0 | Status: SHIPPED | OUTPATIENT
Start: 2021-04-06 | End: 2021-06-08

## 2021-04-06 RX ORDER — TIZANIDINE 4 MG/1
4 TABLET ORAL 2 TIMES DAILY PRN
Qty: 30 TABLET | Refills: 0 | Status: SHIPPED | OUTPATIENT
Start: 2021-04-06 | End: 2021-06-08

## 2021-04-26 ENCOUNTER — TELEPHONE (OUTPATIENT)
Dept: PRIMARY CARE CLINIC | Facility: CLINIC | Age: 76
End: 2021-04-26

## 2021-04-26 DIAGNOSIS — M75.51 BURSITIS OF RIGHT SHOULDER: Primary | ICD-10-CM

## 2021-04-26 DIAGNOSIS — M25.511 CHRONIC RIGHT SHOULDER PAIN: ICD-10-CM

## 2021-04-26 DIAGNOSIS — G89.29 CHRONIC RIGHT SHOULDER PAIN: ICD-10-CM

## 2021-05-05 ENCOUNTER — TELEPHONE (OUTPATIENT)
Dept: PRIMARY CARE CLINIC | Facility: CLINIC | Age: 76
End: 2021-05-05

## 2021-06-05 DIAGNOSIS — M75.51 BURSITIS OF RIGHT SHOULDER: ICD-10-CM

## 2021-06-05 DIAGNOSIS — M54.2 PAIN RADIATING TO NECK: ICD-10-CM

## 2021-06-08 RX ORDER — MELOXICAM 7.5 MG/1
TABLET ORAL
Qty: 30 TABLET | Refills: 0 | Status: SHIPPED | OUTPATIENT
Start: 2021-06-08 | End: 2021-11-15

## 2021-06-08 RX ORDER — TIZANIDINE 4 MG/1
4 TABLET ORAL 2 TIMES DAILY
Qty: 30 TABLET | Refills: 0 | Status: SHIPPED | OUTPATIENT
Start: 2021-06-08 | End: 2021-11-15

## 2021-06-08 RX ORDER — TIZANIDINE 4 MG/1
TABLET ORAL
Qty: 30 TABLET | Refills: 0 | Status: SHIPPED | OUTPATIENT
Start: 2021-06-08 | End: 2021-06-08 | Stop reason: SDUPTHER

## 2021-11-15 ENCOUNTER — OFFICE VISIT (OUTPATIENT)
Dept: PRIMARY CARE CLINIC | Facility: CLINIC | Age: 76
End: 2021-11-15
Payer: MEDICARE

## 2021-11-15 VITALS
DIASTOLIC BLOOD PRESSURE: 64 MMHG | SYSTOLIC BLOOD PRESSURE: 110 MMHG | BODY MASS INDEX: 20.14 KG/M2 | OXYGEN SATURATION: 97 % | WEIGHT: 109.44 LBS | HEIGHT: 62 IN | HEART RATE: 86 BPM

## 2021-11-15 DIAGNOSIS — K21.9 GASTROESOPHAGEAL REFLUX DISEASE, UNSPECIFIED WHETHER ESOPHAGITIS PRESENT: ICD-10-CM

## 2021-11-15 DIAGNOSIS — Z78.0 ENCOUNTER FOR OSTEOPOROSIS SCREENING IN ASYMPTOMATIC POSTMENOPAUSAL PATIENT: ICD-10-CM

## 2021-11-15 DIAGNOSIS — Z13.6 ENCOUNTER FOR LIPID SCREENING FOR CARDIOVASCULAR DISEASE: ICD-10-CM

## 2021-11-15 DIAGNOSIS — Z13.220 ENCOUNTER FOR LIPID SCREENING FOR CARDIOVASCULAR DISEASE: ICD-10-CM

## 2021-11-15 DIAGNOSIS — R52 GENERALIZED PAIN: ICD-10-CM

## 2021-11-15 DIAGNOSIS — I10 BENIGN HYPERTENSION: Primary | ICD-10-CM

## 2021-11-15 DIAGNOSIS — Z13.820 ENCOUNTER FOR OSTEOPOROSIS SCREENING IN ASYMPTOMATIC POSTMENOPAUSAL PATIENT: ICD-10-CM

## 2021-11-15 DIAGNOSIS — H10.13 ALLERGIC CONJUNCTIVITIS OF BOTH EYES: ICD-10-CM

## 2021-11-15 PROCEDURE — 3288F FALL RISK ASSESSMENT DOCD: CPT | Mod: CPTII,S$GLB,, | Performed by: FAMILY MEDICINE

## 2021-11-15 PROCEDURE — 99499 UNLISTED E&M SERVICE: CPT | Mod: S$GLB,,, | Performed by: FAMILY MEDICINE

## 2021-11-15 PROCEDURE — 1160F PR REVIEW ALL MEDS BY PRESCRIBER/CLIN PHARMACIST DOCUMENTED: ICD-10-PCS | Mod: CPTII,S$GLB,, | Performed by: FAMILY MEDICINE

## 2021-11-15 PROCEDURE — 1160F RVW MEDS BY RX/DR IN RCRD: CPT | Mod: CPTII,S$GLB,, | Performed by: FAMILY MEDICINE

## 2021-11-15 PROCEDURE — 1126F PR PAIN SEVERITY QUANTIFIED, NO PAIN PRESENT: ICD-10-PCS | Mod: CPTII,S$GLB,, | Performed by: FAMILY MEDICINE

## 2021-11-15 PROCEDURE — 3288F PR FALLS RISK ASSESSMENT DOCUMENTED: ICD-10-PCS | Mod: CPTII,S$GLB,, | Performed by: FAMILY MEDICINE

## 2021-11-15 PROCEDURE — 99204 PR OFFICE/OUTPT VISIT, NEW, LEVL IV, 45-59 MIN: ICD-10-PCS | Mod: S$GLB,,, | Performed by: FAMILY MEDICINE

## 2021-11-15 PROCEDURE — 1159F MED LIST DOCD IN RCRD: CPT | Mod: CPTII,S$GLB,, | Performed by: FAMILY MEDICINE

## 2021-11-15 PROCEDURE — 1101F PT FALLS ASSESS-DOCD LE1/YR: CPT | Mod: CPTII,S$GLB,, | Performed by: FAMILY MEDICINE

## 2021-11-15 PROCEDURE — 1101F PR PT FALLS ASSESS DOC 0-1 FALLS W/OUT INJ PAST YR: ICD-10-PCS | Mod: CPTII,S$GLB,, | Performed by: FAMILY MEDICINE

## 2021-11-15 PROCEDURE — 99204 OFFICE O/P NEW MOD 45 MIN: CPT | Mod: S$GLB,,, | Performed by: FAMILY MEDICINE

## 2021-11-15 PROCEDURE — 1126F AMNT PAIN NOTED NONE PRSNT: CPT | Mod: CPTII,S$GLB,, | Performed by: FAMILY MEDICINE

## 2021-11-15 PROCEDURE — 3078F PR MOST RECENT DIASTOLIC BLOOD PRESSURE < 80 MM HG: ICD-10-PCS | Mod: CPTII,S$GLB,, | Performed by: FAMILY MEDICINE

## 2021-11-15 PROCEDURE — 1159F PR MEDICATION LIST DOCUMENTED IN MEDICAL RECORD: ICD-10-PCS | Mod: CPTII,S$GLB,, | Performed by: FAMILY MEDICINE

## 2021-11-15 PROCEDURE — 3074F SYST BP LT 130 MM HG: CPT | Mod: CPTII,S$GLB,, | Performed by: FAMILY MEDICINE

## 2021-11-15 PROCEDURE — 99999 PR PBB SHADOW E&M-EST. PATIENT-LVL III: ICD-10-PCS | Mod: PBBFAC,,, | Performed by: FAMILY MEDICINE

## 2021-11-15 PROCEDURE — 99999 PR PBB SHADOW E&M-EST. PATIENT-LVL III: CPT | Mod: PBBFAC,,, | Performed by: FAMILY MEDICINE

## 2021-11-15 PROCEDURE — 3078F DIAST BP <80 MM HG: CPT | Mod: CPTII,S$GLB,, | Performed by: FAMILY MEDICINE

## 2021-11-15 PROCEDURE — 3074F PR MOST RECENT SYSTOLIC BLOOD PRESSURE < 130 MM HG: ICD-10-PCS | Mod: CPTII,S$GLB,, | Performed by: FAMILY MEDICINE

## 2021-11-15 PROCEDURE — 99499 RISK ADDL DX/OHS AUDIT: ICD-10-PCS | Mod: S$GLB,,, | Performed by: FAMILY MEDICINE

## 2021-11-15 RX ORDER — ESOMEPRAZOLE MAGNESIUM 40 MG/1
40 CAPSULE, DELAYED RELEASE ORAL DAILY
Qty: 90 CAPSULE | Refills: 0 | Status: SHIPPED | OUTPATIENT
Start: 2021-11-15 | End: 2022-03-15 | Stop reason: SDUPTHER

## 2021-11-15 RX ORDER — DICLOFENAC SODIUM 10 MG/G
2 GEL TOPICAL 4 TIMES DAILY
Qty: 100 G | Refills: 3 | Status: SHIPPED | OUTPATIENT
Start: 2021-11-15 | End: 2022-03-15 | Stop reason: SDUPTHER

## 2021-11-15 RX ORDER — METOPROLOL SUCCINATE 25 MG/1
25 TABLET, EXTENDED RELEASE ORAL DAILY
Qty: 90 TABLET | Refills: 3 | Status: SHIPPED | OUTPATIENT
Start: 2021-11-15 | End: 2022-10-18 | Stop reason: SDUPTHER

## 2021-11-26 ENCOUNTER — TELEPHONE (OUTPATIENT)
Dept: PRIMARY CARE CLINIC | Facility: CLINIC | Age: 76
End: 2021-11-26
Payer: MEDICARE

## 2022-01-31 ENCOUNTER — LAB VISIT (OUTPATIENT)
Dept: PRIMARY CARE CLINIC | Facility: CLINIC | Age: 77
End: 2022-01-31
Payer: MEDICARE

## 2022-01-31 DIAGNOSIS — Z13.220 ENCOUNTER FOR LIPID SCREENING FOR CARDIOVASCULAR DISEASE: ICD-10-CM

## 2022-01-31 DIAGNOSIS — Z13.6 ENCOUNTER FOR LIPID SCREENING FOR CARDIOVASCULAR DISEASE: ICD-10-CM

## 2022-01-31 DIAGNOSIS — I10 BENIGN HYPERTENSION: ICD-10-CM

## 2022-01-31 LAB
ALBUMIN SERPL BCP-MCNC: 3.6 G/DL (ref 3.5–5.2)
ALP SERPL-CCNC: 89 U/L (ref 55–135)
ALT SERPL W/O P-5'-P-CCNC: 31 U/L (ref 10–44)
ANION GAP SERPL CALC-SCNC: 8 MMOL/L (ref 8–16)
AST SERPL-CCNC: 31 U/L (ref 10–40)
BILIRUB SERPL-MCNC: 0.8 MG/DL (ref 0.1–1)
BUN SERPL-MCNC: 18 MG/DL (ref 8–23)
CALCIUM SERPL-MCNC: 9 MG/DL (ref 8.7–10.5)
CHLORIDE SERPL-SCNC: 107 MMOL/L (ref 95–110)
CHOLEST SERPL-MCNC: 165 MG/DL (ref 120–199)
CHOLEST/HDLC SERPL: 2.6 {RATIO} (ref 2–5)
CO2 SERPL-SCNC: 25 MMOL/L (ref 23–29)
CREAT SERPL-MCNC: 0.8 MG/DL (ref 0.5–1.4)
EST. GFR  (AFRICAN AMERICAN): >60 ML/MIN/1.73 M^2
EST. GFR  (NON AFRICAN AMERICAN): >60 ML/MIN/1.73 M^2
GLUCOSE SERPL-MCNC: 93 MG/DL (ref 70–110)
HDLC SERPL-MCNC: 63 MG/DL (ref 40–75)
HDLC SERPL: 38.2 % (ref 20–50)
LDLC SERPL CALC-MCNC: 89.2 MG/DL (ref 63–159)
NONHDLC SERPL-MCNC: 102 MG/DL
POTASSIUM SERPL-SCNC: 4.3 MMOL/L (ref 3.5–5.1)
PROT SERPL-MCNC: 6.3 G/DL (ref 6–8.4)
SODIUM SERPL-SCNC: 140 MMOL/L (ref 136–145)
TRIGL SERPL-MCNC: 64 MG/DL (ref 30–150)

## 2022-01-31 PROCEDURE — 36415 COLL VENOUS BLD VENIPUNCTURE: CPT | Mod: S$GLB,,, | Performed by: FAMILY MEDICINE

## 2022-01-31 PROCEDURE — 80061 LIPID PANEL: CPT | Performed by: FAMILY MEDICINE

## 2022-01-31 PROCEDURE — 80053 COMPREHEN METABOLIC PANEL: CPT | Performed by: FAMILY MEDICINE

## 2022-01-31 PROCEDURE — 36415 PR COLLECTION VENOUS BLOOD,VENIPUNCTURE: ICD-10-PCS | Mod: S$GLB,,, | Performed by: FAMILY MEDICINE

## 2022-03-15 ENCOUNTER — OFFICE VISIT (OUTPATIENT)
Dept: PRIMARY CARE CLINIC | Facility: CLINIC | Age: 77
End: 2022-03-15
Payer: MEDICARE

## 2022-03-15 VITALS
BODY MASS INDEX: 20.51 KG/M2 | DIASTOLIC BLOOD PRESSURE: 65 MMHG | OXYGEN SATURATION: 98 % | WEIGHT: 111.44 LBS | HEIGHT: 62 IN | HEART RATE: 75 BPM | SYSTOLIC BLOOD PRESSURE: 140 MMHG

## 2022-03-15 DIAGNOSIS — K21.9 GASTROESOPHAGEAL REFLUX DISEASE, UNSPECIFIED WHETHER ESOPHAGITIS PRESENT: ICD-10-CM

## 2022-03-15 DIAGNOSIS — R52 GENERALIZED PAIN: ICD-10-CM

## 2022-03-15 DIAGNOSIS — J06.9 UPPER RESPIRATORY TRACT INFECTION, UNSPECIFIED TYPE: Primary | ICD-10-CM

## 2022-03-15 DIAGNOSIS — I10 ESSENTIAL HYPERTENSION: ICD-10-CM

## 2022-03-15 PROCEDURE — 99213 PR OFFICE/OUTPT VISIT, EST, LEVL III, 20-29 MIN: ICD-10-PCS | Mod: S$GLB,,, | Performed by: FAMILY MEDICINE

## 2022-03-15 PROCEDURE — 3288F FALL RISK ASSESSMENT DOCD: CPT | Mod: CPTII,S$GLB,, | Performed by: FAMILY MEDICINE

## 2022-03-15 PROCEDURE — 3077F SYST BP >= 140 MM HG: CPT | Mod: CPTII,S$GLB,, | Performed by: FAMILY MEDICINE

## 2022-03-15 PROCEDURE — 3077F PR MOST RECENT SYSTOLIC BLOOD PRESSURE >= 140 MM HG: ICD-10-PCS | Mod: CPTII,S$GLB,, | Performed by: FAMILY MEDICINE

## 2022-03-15 PROCEDURE — 1126F AMNT PAIN NOTED NONE PRSNT: CPT | Mod: CPTII,S$GLB,, | Performed by: FAMILY MEDICINE

## 2022-03-15 PROCEDURE — 1159F PR MEDICATION LIST DOCUMENTED IN MEDICAL RECORD: ICD-10-PCS | Mod: CPTII,S$GLB,, | Performed by: FAMILY MEDICINE

## 2022-03-15 PROCEDURE — 1101F PT FALLS ASSESS-DOCD LE1/YR: CPT | Mod: CPTII,S$GLB,, | Performed by: FAMILY MEDICINE

## 2022-03-15 PROCEDURE — 1160F PR REVIEW ALL MEDS BY PRESCRIBER/CLIN PHARMACIST DOCUMENTED: ICD-10-PCS | Mod: CPTII,S$GLB,, | Performed by: FAMILY MEDICINE

## 2022-03-15 PROCEDURE — 1160F RVW MEDS BY RX/DR IN RCRD: CPT | Mod: CPTII,S$GLB,, | Performed by: FAMILY MEDICINE

## 2022-03-15 PROCEDURE — 1159F MED LIST DOCD IN RCRD: CPT | Mod: CPTII,S$GLB,, | Performed by: FAMILY MEDICINE

## 2022-03-15 PROCEDURE — 1101F PR PT FALLS ASSESS DOC 0-1 FALLS W/OUT INJ PAST YR: ICD-10-PCS | Mod: CPTII,S$GLB,, | Performed by: FAMILY MEDICINE

## 2022-03-15 PROCEDURE — 3288F PR FALLS RISK ASSESSMENT DOCUMENTED: ICD-10-PCS | Mod: CPTII,S$GLB,, | Performed by: FAMILY MEDICINE

## 2022-03-15 PROCEDURE — 3078F PR MOST RECENT DIASTOLIC BLOOD PRESSURE < 80 MM HG: ICD-10-PCS | Mod: CPTII,S$GLB,, | Performed by: FAMILY MEDICINE

## 2022-03-15 PROCEDURE — 99213 OFFICE O/P EST LOW 20 MIN: CPT | Mod: S$GLB,,, | Performed by: FAMILY MEDICINE

## 2022-03-15 PROCEDURE — 3078F DIAST BP <80 MM HG: CPT | Mod: CPTII,S$GLB,, | Performed by: FAMILY MEDICINE

## 2022-03-15 PROCEDURE — 1126F PR PAIN SEVERITY QUANTIFIED, NO PAIN PRESENT: ICD-10-PCS | Mod: CPTII,S$GLB,, | Performed by: FAMILY MEDICINE

## 2022-03-15 PROCEDURE — 99999 PR PBB SHADOW E&M-EST. PATIENT-LVL III: CPT | Mod: PBBFAC,,, | Performed by: FAMILY MEDICINE

## 2022-03-15 PROCEDURE — 99999 PR PBB SHADOW E&M-EST. PATIENT-LVL III: ICD-10-PCS | Mod: PBBFAC,,, | Performed by: FAMILY MEDICINE

## 2022-03-15 RX ORDER — ESOMEPRAZOLE MAGNESIUM 40 MG/1
40 CAPSULE, DELAYED RELEASE ORAL DAILY
Qty: 90 CAPSULE | Refills: 2 | Status: SHIPPED | OUTPATIENT
Start: 2022-03-15 | End: 2022-07-29

## 2022-03-15 RX ORDER — DICLOFENAC SODIUM 10 MG/G
2 GEL TOPICAL 4 TIMES DAILY
Qty: 100 G | Refills: 3 | Status: SHIPPED | OUTPATIENT
Start: 2022-03-15 | End: 2022-06-16 | Stop reason: SDUPTHER

## 2022-03-15 RX ORDER — AMOXICILLIN 500 MG/1
500 CAPSULE ORAL 3 TIMES DAILY
COMMUNITY
Start: 2021-11-29 | End: 2022-03-15

## 2022-03-15 RX ORDER — IBUPROFEN 800 MG/1
800 TABLET ORAL EVERY 6 HOURS PRN
COMMUNITY
Start: 2021-11-29 | End: 2022-06-14

## 2022-03-15 RX ORDER — HYDROCODONE BITARTRATE AND ACETAMINOPHEN 5; 325 MG/1; MG/1
1 TABLET ORAL EVERY 6 HOURS PRN
COMMUNITY
Start: 2021-12-23 | End: 2022-03-15

## 2022-03-15 RX ORDER — INFLUENZA A VIRUS A/VICTORIA/2570/2019 IVR-215 (H1N1) ANTIGEN (FORMALDEHYDE INACTIVATED), INFLUENZA A VIRUS A/TASMANIA/503/2020 IVR-221 (H3N2) ANTIGEN (FORMALDEHYDE INACTIVATED), INFLUENZA B VIRUS B/PHUKET/3073/2013 ANTIGEN (FORMALDEHYDE INACTIVATED), AND INFLUENZA B VIRUS B/WASHINGTON/02/2019 ANTIGEN (FORMALDEHYDE INACTIVATED) 60; 60; 60; 60 UG/.7ML; UG/.7ML; UG/.7ML; UG/.7ML
INJECTION, SUSPENSION INTRAMUSCULAR
COMMUNITY
Start: 2021-10-04 | End: 2022-03-15

## 2022-03-15 NOTE — PROGRESS NOTES
Clinic Note  3/15/2022      Subjective:       Patient ID:  Louis is a 76 y.o. female being seen for an established visit.    Chief Complaint: Follow-up (3 month)    Follow-up-patient here with  and daughter    Hypertension-patient occasionally takes her metoprolol for high blood pressure    Hyperlipidemia-taking her statin    URI-over the last several days, patient reports some congestion and cough that has abdomen improving.  Denies any fever, chills, nausea, vomiting, sore throat, dyspnea, body aches.  Has completed COVID-19 vaccination series, does not plan on getting the booster at this time    GERD - takes her PPI p.r.n.    Osteoarthritis-uses Voltaren gel      Review of Systems   Constitutional: Negative for chills, fever, malaise/fatigue and weight loss.   HENT: Negative for congestion, sinus pain and sore throat.    Respiratory: Negative for cough, shortness of breath and wheezing.    Cardiovascular: Negative for chest pain and palpitations.   Gastrointestinal: Negative for constipation, diarrhea, nausea and vomiting.   Genitourinary: Negative for dysuria, frequency and urgency.   Musculoskeletal: Negative for myalgias.   Skin: Negative for rash.   Neurological: Negative for headaches.       Medication List with Changes/Refills   Current Medications    ATORVASTATIN (LIPITOR) 20 MG TABLET    Take 1 tablet by mouth once daily    IBUPROFEN (ADVIL,MOTRIN) 800 MG TABLET    Take 800 mg by mouth every 6 (six) hours as needed.    METOPROLOL SUCCINATE (TOPROL-XL) 25 MG 24 HR TABLET    Take 1 tablet (25 mg total) by mouth once daily. Thuoc fady (lizzy vega)    PAZEO 0.7 % DROP    INSTILL 1 DROP INTO RIGHT EYE ONCE DAILY (THUOC  MAT)   Changed and/or Refilled Medications    Modified Medication Previous Medication    DICLOFENAC SODIUM (VOLTAREN) 1 % GEL diclofenac sodium (VOLTAREN) 1 % Gel       Apply 2 g topically 4 (four) times daily.    Apply 2 g topically 4 (four) times daily.    ESOMEPRAZOLE (NEXIUM) 40 MG  "CAPSULE esomeprazole (NEXIUM) 40 MG capsule       Take 1 capsule (40 mg total) by mouth once daily. Arti barker    Take 1 capsule (40 mg total) by mouth once daily. Arti barker   Discontinued Medications    AMOXICILLIN (AMOXIL) 500 MG CAPSULE    Take 500 mg by mouth 3 (three) times daily.    ESTRADIOL (ESTRACE) 0.01 % (0.1 MG/GRAM) VAGINAL CREAM    Apply vag 2 x a week    FLUZONE HIGHDOSE QUAD 21-22  MCG/0.7 ML SYRG        HYDROCODONE-ACETAMINOPHEN (NORCO) 5-325 MG PER TABLET    Take 1 tablet by mouth every 6 (six) hours as needed.       Patient Active Problem List   Diagnosis    Vaginal atrophy           Objective:      BP (!) 140/65 (BP Location: Left arm, Patient Position: Sitting, BP Method: Medium (Automatic))   Pulse 75   Ht 5' 2" (1.575 m)   Wt 50.5 kg (111 lb 7.1 oz)   LMP  (LMP Unknown)   SpO2 98%   BMI 20.38 kg/m²   Estimated body mass index is 20.38 kg/m² as calculated from the following:    Height as of this encounter: 5' 2" (1.575 m).    Weight as of this encounter: 50.5 kg (111 lb 7.1 oz).  Physical Exam  Vitals reviewed.   Constitutional:       General: She is not in acute distress.     Appearance: She is not diaphoretic.   HENT:      Head: Normocephalic and atraumatic.   Eyes:      Conjunctiva/sclera: Conjunctivae normal.   Cardiovascular:      Rate and Rhythm: Normal rate and regular rhythm.      Heart sounds: Normal heart sounds.   Pulmonary:      Effort: Pulmonary effort is normal. No respiratory distress.      Breath sounds: Normal breath sounds. No wheezing.   Abdominal:      General: Bowel sounds are normal.      Palpations: Abdomen is soft.   Musculoskeletal:         General: Normal range of motion.      Cervical back: Normal range of motion.   Skin:     General: Skin is warm and dry.      Findings: No erythema or rash.   Neurological:      Mental Status: She is alert and oriented to person, place, and time.   Psychiatric:         Mood and Affect: Mood and affect normal.         " Behavior: Behavior normal.         Thought Content: Thought content normal.         Judgment: Judgment normal.           Assessment and Plan:     1. Upper respiratory tract infection, unspecified type  - vital signs stable, normal physical exam, continue supportive therapy    2. Generalized pain  - diclofenac sodium (VOLTAREN) 1 % Gel; Apply 2 g topically 4 (four) times daily.  Dispense: 100 g; Refill: 3    3. Gastroesophageal reflux disease, unspecified whether esophagitis present  - discussed trying to limit the use of PPI  - esomeprazole (NEXIUM) 40 MG capsule; Take 1 capsule (40 mg total) by mouth once daily. [facility]  Dispense: 90 capsule; Refill: 2    4. Essential hypertension  - blood pressure today 140/65, patient did not take her metoprolol with 25 mg this morning.  Takes the medication intermittently.  Discussed with patient the importance of taking it daily        Follow Up:   Follow up in about 3 months (around 6/15/2022) for follow-up.    Other Orders Placed This Visit:  No orders of the defined types were placed in this encounter.        Nhan Bronson MD        This note is dictated on M*Modal word recognition program.  There are word recognition mistakes that are occasionally missed on review.

## 2022-06-14 ENCOUNTER — OFFICE VISIT (OUTPATIENT)
Dept: PRIMARY CARE CLINIC | Facility: CLINIC | Age: 77
End: 2022-06-14
Payer: MEDICARE

## 2022-06-14 VITALS
OXYGEN SATURATION: 96 % | WEIGHT: 106.38 LBS | HEART RATE: 89 BPM | BODY MASS INDEX: 19.57 KG/M2 | HEIGHT: 62 IN | DIASTOLIC BLOOD PRESSURE: 64 MMHG | SYSTOLIC BLOOD PRESSURE: 109 MMHG

## 2022-06-14 DIAGNOSIS — R10.13 EPIGASTRIC PAIN: ICD-10-CM

## 2022-06-14 DIAGNOSIS — E78.5 HYPERLIPIDEMIA, UNSPECIFIED HYPERLIPIDEMIA TYPE: ICD-10-CM

## 2022-06-14 DIAGNOSIS — M19.90 OSTEOARTHRITIS, UNSPECIFIED OSTEOARTHRITIS TYPE, UNSPECIFIED SITE: ICD-10-CM

## 2022-06-14 DIAGNOSIS — Z86.19 HISTORY OF HELICOBACTER PYLORI INFECTION: Primary | ICD-10-CM

## 2022-06-14 DIAGNOSIS — I10 HYPERTENSION, UNSPECIFIED TYPE: ICD-10-CM

## 2022-06-14 PROCEDURE — 99214 PR OFFICE/OUTPT VISIT, EST, LEVL IV, 30-39 MIN: ICD-10-PCS | Mod: S$GLB,,, | Performed by: FAMILY MEDICINE

## 2022-06-14 PROCEDURE — 1160F PR REVIEW ALL MEDS BY PRESCRIBER/CLIN PHARMACIST DOCUMENTED: ICD-10-PCS | Mod: CPTII,S$GLB,, | Performed by: FAMILY MEDICINE

## 2022-06-14 PROCEDURE — 3074F SYST BP LT 130 MM HG: CPT | Mod: CPTII,S$GLB,, | Performed by: FAMILY MEDICINE

## 2022-06-14 PROCEDURE — 1159F MED LIST DOCD IN RCRD: CPT | Mod: CPTII,S$GLB,, | Performed by: FAMILY MEDICINE

## 2022-06-14 PROCEDURE — 3288F FALL RISK ASSESSMENT DOCD: CPT | Mod: CPTII,S$GLB,, | Performed by: FAMILY MEDICINE

## 2022-06-14 PROCEDURE — 3078F PR MOST RECENT DIASTOLIC BLOOD PRESSURE < 80 MM HG: ICD-10-PCS | Mod: CPTII,S$GLB,, | Performed by: FAMILY MEDICINE

## 2022-06-14 PROCEDURE — 99214 OFFICE O/P EST MOD 30 MIN: CPT | Mod: S$GLB,,, | Performed by: FAMILY MEDICINE

## 2022-06-14 PROCEDURE — 3288F PR FALLS RISK ASSESSMENT DOCUMENTED: ICD-10-PCS | Mod: CPTII,S$GLB,, | Performed by: FAMILY MEDICINE

## 2022-06-14 PROCEDURE — 3078F DIAST BP <80 MM HG: CPT | Mod: CPTII,S$GLB,, | Performed by: FAMILY MEDICINE

## 2022-06-14 PROCEDURE — 1126F PR PAIN SEVERITY QUANTIFIED, NO PAIN PRESENT: ICD-10-PCS | Mod: CPTII,S$GLB,, | Performed by: FAMILY MEDICINE

## 2022-06-14 PROCEDURE — 1126F AMNT PAIN NOTED NONE PRSNT: CPT | Mod: CPTII,S$GLB,, | Performed by: FAMILY MEDICINE

## 2022-06-14 PROCEDURE — 1159F PR MEDICATION LIST DOCUMENTED IN MEDICAL RECORD: ICD-10-PCS | Mod: CPTII,S$GLB,, | Performed by: FAMILY MEDICINE

## 2022-06-14 PROCEDURE — 99999 PR PBB SHADOW E&M-EST. PATIENT-LVL IV: ICD-10-PCS | Mod: PBBFAC,,, | Performed by: FAMILY MEDICINE

## 2022-06-14 PROCEDURE — 1101F PR PT FALLS ASSESS DOC 0-1 FALLS W/OUT INJ PAST YR: ICD-10-PCS | Mod: CPTII,S$GLB,, | Performed by: FAMILY MEDICINE

## 2022-06-14 PROCEDURE — 1101F PT FALLS ASSESS-DOCD LE1/YR: CPT | Mod: CPTII,S$GLB,, | Performed by: FAMILY MEDICINE

## 2022-06-14 PROCEDURE — 3074F PR MOST RECENT SYSTOLIC BLOOD PRESSURE < 130 MM HG: ICD-10-PCS | Mod: CPTII,S$GLB,, | Performed by: FAMILY MEDICINE

## 2022-06-14 PROCEDURE — 1160F RVW MEDS BY RX/DR IN RCRD: CPT | Mod: CPTII,S$GLB,, | Performed by: FAMILY MEDICINE

## 2022-06-14 PROCEDURE — 99999 PR PBB SHADOW E&M-EST. PATIENT-LVL IV: CPT | Mod: PBBFAC,,, | Performed by: FAMILY MEDICINE

## 2022-06-14 NOTE — PROGRESS NOTES
Clinic Note  6/14/2022      Subjective:       Patient ID:  Louis is a 77 y.o. female being seen for an established visit.    Chief Complaint: Follow-up (3 month)    Follow-up-patient with a history of hypertension, hyperlipidemia, H pylori infection here for follow-up    Epigastric pain/dyspepsia-patient reports that over the last month, her epigastric pain and dyspepsia has returned with associated fatigue.  Pain may be associated with food, other times not associated food.  Has a history of H pylori infection x2, last H pylori stool 1 year ago was negative.  Taking her PPI p.r.n..  Has not had an upper endoscopy in several years    Hypertension-taking metoprolol without problems    Osteoarthritis-doing well on Voltaren gel.  Taking multivitamins daily          Review of Systems   Constitutional: Negative for chills, fever, malaise/fatigue and weight loss.   HENT: Negative for congestion, sinus pain and sore throat.    Respiratory: Negative for cough, shortness of breath and wheezing.    Cardiovascular: Negative for chest pain and palpitations.   Gastrointestinal: Positive for abdominal pain. Negative for blood in stool, constipation, diarrhea, heartburn, melena, nausea and vomiting.   Genitourinary: Negative for dysuria, frequency and urgency.   Musculoskeletal: Negative for myalgias.   Skin: Negative for rash.   Neurological: Negative for headaches.       Medication List with Changes/Refills   Current Medications    ATORVASTATIN (LIPITOR) 20 MG TABLET    Take 1 tablet by mouth once daily    DICLOFENAC SODIUM (VOLTAREN) 1 % GEL    Apply 2 g topically 4 (four) times daily.    ESOMEPRAZOLE (NEXIUM) 40 MG CAPSULE    Take 1 capsule (40 mg total) by mouth once daily. [facility] melissa tu    METOPROLOL SUCCINATE (TOPROL-XL) 25 MG 24 HR TABLET    Take 1 tablet (25 mg total) by mouth once daily. uo fady (lizzy vega)    PAZEO 0.7 % DROP    INSTILL 1 DROP INTO RIGHT EYE ONCE DAILY ([facility]  MAT)   Discontinued Medications     "IBUPROFEN (ADVIL,MOTRIN) 800 MG TABLET    Take 800 mg by mouth every 6 (six) hours as needed.       Patient Active Problem List   Diagnosis    Vaginal atrophy    Hyperlipidemia    Hypertension           Objective:      /64 (BP Location: Right arm, Patient Position: Sitting, BP Method: Small (Manual))   Pulse 89   Ht 5' 2" (1.575 m)   Wt 48.2 kg (106 lb 6 oz)   LMP  (LMP Unknown)   SpO2 96%   BMI 19.46 kg/m²   Estimated body mass index is 19.46 kg/m² as calculated from the following:    Height as of this encounter: 5' 2" (1.575 m).    Weight as of this encounter: 48.2 kg (106 lb 6 oz).  Physical Exam  Vitals reviewed.   Constitutional:       General: She is not in acute distress.     Appearance: She is not diaphoretic.   HENT:      Head: Normocephalic and atraumatic.   Eyes:      Conjunctiva/sclera: Conjunctivae normal.   Cardiovascular:      Rate and Rhythm: Normal rate and regular rhythm.      Heart sounds: Normal heart sounds.   Pulmonary:      Effort: Pulmonary effort is normal. No respiratory distress.      Breath sounds: Normal breath sounds. No wheezing.   Abdominal:      General: Bowel sounds are normal.      Palpations: Abdomen is soft.   Musculoskeletal:         General: Normal range of motion.      Cervical back: Normal range of motion.   Skin:     General: Skin is warm and dry.      Findings: No erythema or rash.   Neurological:      Mental Status: She is alert and oriented to person, place, and time.   Psychiatric:         Mood and Affect: Mood and affect normal.         Behavior: Behavior normal.         Thought Content: Thought content normal.         Judgment: Judgment normal.           Assessment and Plan:     1. History of Helicobacter pylori infection / Epigastric pain  - patient with recurring epigastric pain and history of H pylori infection x2, can recheck H pylori antigen stool at this time.  However it may not be accurate because patient is taking PPI p.r.n..  Refer to " Gastroenterology for further evaluation and possible repeat endoscopy  - H. pylori antigen, stool; Future  - Ambulatory referral/consult to Gastroenterology; Future    3. Hyperlipidemia, unspecified hyperlipidemia type  - recent lipid panel unremarkable, can continue atorvastatin    4. Hypertension, unspecified type  - blood pressure controlled on metoprolol 25 mg q.d.    5. Osteoarthritis, unspecified osteoarthritis type, unspecified site  - stable on voltaren gel      Follow Up:   Follow up in about 4 months (around 10/14/2022) for follow-up.    Other Orders Placed This Visit:  Orders Placed This Encounter   Procedures    H. pylori antigen, stool    Ambulatory referral/consult to Gastroenterology         Nhan Bronson MD        This note is dictated on M*Modal word recognition program.  There are word recognition mistakes that are occasionally missed on review.

## 2022-06-15 ENCOUNTER — LAB VISIT (OUTPATIENT)
Dept: PRIMARY CARE CLINIC | Facility: CLINIC | Age: 77
End: 2022-06-15
Payer: MEDICARE

## 2022-06-15 DIAGNOSIS — R52 GENERALIZED PAIN: ICD-10-CM

## 2022-06-15 DIAGNOSIS — Z86.19 HISTORY OF HELICOBACTER PYLORI INFECTION: ICD-10-CM

## 2022-06-15 PROCEDURE — 87338 HPYLORI STOOL AG IA: CPT | Performed by: FAMILY MEDICINE

## 2022-06-15 NOTE — TELEPHONE ENCOUNTER
----- Message from Isela Sawyer sent at 6/15/2022  2:21 PM CDT -----  Regarding: refills  Contact: Jennifer Prisca [phone]  Requesting an RX refill or new RX.  Is this a refill or new RX: refill  RX name and strength (diclofenac sodium (VOLTAREN) 1 % Gel  Is this a 30 day or 90 day RX:   Pharmacy name and phone # (  [facility] [place], [place] - [address]  [address]  [facility] [address]  Phone: [phone] Fax: [phone]    The doctors have asked that we provide their patients with the following 2 reminders -- prescription refills can take up to 72 hours, and a friendly reminder that in the future you can use your [facility] account to request refills: yes

## 2022-06-16 DIAGNOSIS — R52 GENERALIZED PAIN: ICD-10-CM

## 2022-06-16 RX ORDER — DICLOFENAC SODIUM 10 MG/G
2 GEL TOPICAL 4 TIMES DAILY
Qty: 100 G | Refills: 3 | Status: SHIPPED | OUTPATIENT
Start: 2022-06-16 | End: 2022-10-18 | Stop reason: SDUPTHER

## 2022-06-16 RX ORDER — DICLOFENAC SODIUM 10 MG/G
2 GEL TOPICAL 4 TIMES DAILY
Qty: 100 G | Refills: 3 | OUTPATIENT
Start: 2022-06-16

## 2022-06-22 ENCOUNTER — PATIENT MESSAGE (OUTPATIENT)
Dept: PRIMARY CARE CLINIC | Facility: CLINIC | Age: 77
End: 2022-06-22
Payer: MEDICARE

## 2022-06-22 LAB
H PYLORI AG STL QL IA: NOT DETECTED
SPECIMEN SOURCE: 5

## 2022-07-29 ENCOUNTER — OFFICE VISIT (OUTPATIENT)
Dept: GASTROENTEROLOGY | Facility: CLINIC | Age: 77
End: 2022-07-29
Payer: MEDICARE

## 2022-07-29 VITALS
DIASTOLIC BLOOD PRESSURE: 57 MMHG | SYSTOLIC BLOOD PRESSURE: 111 MMHG | BODY MASS INDEX: 20.69 KG/M2 | OXYGEN SATURATION: 99 % | WEIGHT: 112.44 LBS | HEART RATE: 77 BPM | HEIGHT: 62 IN

## 2022-07-29 DIAGNOSIS — R10.13 EPIGASTRIC PAIN: Primary | ICD-10-CM

## 2022-07-29 DIAGNOSIS — Z86.19 HISTORY OF HELICOBACTER PYLORI INFECTION: ICD-10-CM

## 2022-07-29 PROCEDURE — 99214 OFFICE O/P EST MOD 30 MIN: CPT | Mod: S$GLB,,, | Performed by: NURSE PRACTITIONER

## 2022-07-29 PROCEDURE — 99999 PR PBB SHADOW E&M-EST. PATIENT-LVL IV: CPT | Mod: PBBFAC,,, | Performed by: NURSE PRACTITIONER

## 2022-07-29 PROCEDURE — 3288F PR FALLS RISK ASSESSMENT DOCUMENTED: ICD-10-PCS | Mod: CPTII,S$GLB,, | Performed by: NURSE PRACTITIONER

## 2022-07-29 PROCEDURE — 3288F FALL RISK ASSESSMENT DOCD: CPT | Mod: CPTII,S$GLB,, | Performed by: NURSE PRACTITIONER

## 2022-07-29 PROCEDURE — 3078F DIAST BP <80 MM HG: CPT | Mod: CPTII,S$GLB,, | Performed by: NURSE PRACTITIONER

## 2022-07-29 PROCEDURE — 3074F SYST BP LT 130 MM HG: CPT | Mod: CPTII,S$GLB,, | Performed by: NURSE PRACTITIONER

## 2022-07-29 PROCEDURE — 3074F PR MOST RECENT SYSTOLIC BLOOD PRESSURE < 130 MM HG: ICD-10-PCS | Mod: CPTII,S$GLB,, | Performed by: NURSE PRACTITIONER

## 2022-07-29 PROCEDURE — 1101F PR PT FALLS ASSESS DOC 0-1 FALLS W/OUT INJ PAST YR: ICD-10-PCS | Mod: CPTII,S$GLB,, | Performed by: NURSE PRACTITIONER

## 2022-07-29 PROCEDURE — 1125F AMNT PAIN NOTED PAIN PRSNT: CPT | Mod: CPTII,S$GLB,, | Performed by: NURSE PRACTITIONER

## 2022-07-29 PROCEDURE — 1125F PR PAIN SEVERITY QUANTIFIED, PAIN PRESENT: ICD-10-PCS | Mod: CPTII,S$GLB,, | Performed by: NURSE PRACTITIONER

## 2022-07-29 PROCEDURE — 3078F PR MOST RECENT DIASTOLIC BLOOD PRESSURE < 80 MM HG: ICD-10-PCS | Mod: CPTII,S$GLB,, | Performed by: NURSE PRACTITIONER

## 2022-07-29 PROCEDURE — 99214 PR OFFICE/OUTPT VISIT, EST, LEVL IV, 30-39 MIN: ICD-10-PCS | Mod: S$GLB,,, | Performed by: NURSE PRACTITIONER

## 2022-07-29 PROCEDURE — 99999 PR PBB SHADOW E&M-EST. PATIENT-LVL IV: ICD-10-PCS | Mod: PBBFAC,,, | Performed by: NURSE PRACTITIONER

## 2022-07-29 PROCEDURE — 1101F PT FALLS ASSESS-DOCD LE1/YR: CPT | Mod: CPTII,S$GLB,, | Performed by: NURSE PRACTITIONER

## 2022-07-29 RX ORDER — PANTOPRAZOLE SODIUM 40 MG/1
40 TABLET, DELAYED RELEASE ORAL DAILY
Qty: 30 TABLET | Refills: 12 | Status: SHIPPED | OUTPATIENT
Start: 2022-07-29 | End: 2022-09-14

## 2022-07-29 RX ORDER — LEVOCETIRIZINE DIHYDROCHLORIDE 5 MG/1
5 TABLET, FILM COATED ORAL NIGHTLY
COMMUNITY
End: 2022-10-18 | Stop reason: SDUPTHER

## 2022-07-29 RX ORDER — ASPIRIN 81 MG/1
81 TABLET ORAL DAILY
COMMUNITY
End: 2022-10-18

## 2022-07-29 NOTE — PROGRESS NOTES
GASTROENTEROLOGY CLINIC NOTE    Chief Complaint: The primary encounter diagnosis was Epigastric pain. A diagnosis of History of Helicobacter pylori infection was also pertinent to this visit.  Referring provider/PCP: Nhan Bronson MD    HPI:  Louis Bronson is a 77 y.o. female who is a new patient to me with a PMH that's significant for H.pylori. [employer]  was present for office visit. Additional information was obtained after office visit from patient's daughter.  She is here today to establish care for epigastric pain.  This is a new problem that began about 3-4 months ago and occurs daily.  It occurs both prior to and after eating.  The pain is located in the upper abdomen not localized to epigastrium and described as cramping/squeezing. Additionally, she reports dysphagia with rice and bananas. Liquid will clear food. Does not occur with liquids or medications. Denies reflux or pyrosis, nausea or vomiting.  No changes in bowel movements. Occur daily. No melena or hematochezia noted.     It is unknown when previous EGD performed. Last colonoscopy 2-3 years ago; no abnormal findings per patient. Spoke with patient's daughter. [facility] Will request records.     Treatments Tried: Nexium every other day; PCP advised taking every other day b/c of affects of PPI on bone density; better relief noted with daily Nexium  NSAIDs: No  Anticoagulation or Antiplatelet: No      Prior Upper Endoscopy: Unsure  Prior Colonoscopy: 2-3 years ago; no abnormal finidngs.   Family h/o Colon Cancer: No  Family h/o Crohn's Disease or Ulcerative Colitis: No  Family h/o Celiac Sprue: No  Abdominal Surgeries: No    Review of Systems   Constitutional: Negative for weight loss.   HENT: Negative for sore throat.    Eyes: Negative for blurred vision.   Respiratory: Negative for cough.    Cardiovascular: Negative for chest pain.   Gastrointestinal: Positive for abdominal pain (upper). Negative for  "blood in stool, constipation, diarrhea, heartburn, melena, nausea and vomiting.   Genitourinary: Negative for dysuria.   Musculoskeletal: Negative for myalgias.   Skin: Negative for rash.   Neurological: Negative for headaches.   Endo/Heme/Allergies: Negative for environmental allergies.   Psychiatric/Behavioral: Negative for suicidal ideas. The patient is not nervous/anxious.        Past Medical History: has a past medical history of Hyperlipidemia and Hypertension.    Past Surgical History: has no past surgical history on file.    Family History:family history is not on file.    Allergies: Review of patient's allergies indicates:  No Known Allergies    Social History: reports that she has never smoked. She has never used smokeless tobacco. She reports that she does not drink alcohol and does not use drugs.    Home medications:   Current Outpatient Medications on File Prior to Visit   Medication Sig Dispense Refill    atorvastatin (LIPITOR) 20 MG tablet Take 1 tablet by mouth once daily 90 tablet 0    diclofenac sodium (VOLTAREN) 1 % Gel Apply 2 g topically 4 (four) times daily. 100 g 3    esomeprazole (NEXIUM) 40 MG capsule Take 1 capsule (40 mg total) by mouth once daily. [facility]ouc melissa tu 90 capsule 2    metoprolol succinate (TOPROL-XL) 25 MG 24 hr tablet Take 1 tablet (25 mg total) by mouth once daily. Thuoc fady (va fish silvia) 90 tablet 3    PAZEO 0.7 % Drop INSTILL 1 DROP INTO RIGHT EYE ONCE DAILY (TH[facility]OC  MAT) 3 mL 1     No current facility-administered medications on file prior to visit.       Vital signs:  BP (!) 111/57 (BP Location: Right arm, Patient Position: Sitting, BP Method: Medium (Automatic))   Pulse 77   Ht 5' 2" (1.575 m)   Wt 51 kg (112 lb 7 oz)   LMP  (LMP Unknown)   SpO2 99%   BMI 20.56 kg/m²     Physical Exam  Vitals reviewed.   Constitutional:       General: She is not in acute distress.     Appearance: Normal appearance. She is not ill-appearing.   HENT:      Head: Normocephalic. "   Cardiovascular:      Rate and Rhythm: Normal rate and regular rhythm.      Heart sounds: Normal heart sounds. No murmur heard.  Pulmonary:      Effort: Pulmonary effort is normal. No respiratory distress.      Breath sounds: Normal breath sounds.   Chest:      Chest wall: No tenderness.   Abdominal:      General: Bowel sounds are normal. There is no distension.      Palpations: Abdomen is soft.      Tenderness: There is no abdominal tenderness. Negative signs include West's sign.      Hernia: No hernia is present.   Skin:     General: Skin is warm.   Neurological:      Mental Status: She is alert and oriented to person, place, and time.   Psychiatric:         Mood and Affect: Mood normal.         Behavior: Behavior normal.         Routine labs:  Lab Results   Component Value Date    WBC 4.49 02/08/2021    HGB 13.3 02/08/2021    HCT 41.9 02/08/2021    MCV 99 (H) 02/08/2021     02/08/2021     No results found for: INR  No results found for: IRON, FERRITIN, TIBC, FESATURATED  Lab Results   Component Value Date     01/31/2022    K 4.3 01/31/2022     01/31/2022    CO2 25 01/31/2022    BUN 18 01/31/2022    CREATININE 0.8 01/31/2022     Lab Results   Component Value Date    ALBUMIN 3.6 01/31/2022    ALT 31 01/31/2022    AST 31 01/31/2022    ALKPHOS 89 01/31/2022    BILITOT 0.8 01/31/2022     No results found for: GLUCOSE  Lab Results   Component Value Date    TSH 0.31 (L) 05/14/2019     Lab Results   Component Value Date    CALCIUM 9.0 01/31/2022       Imaging:      I have reviewed prior labs, imaging, and notes.      Assessment:  1. Epigastric pain    2. History of Helicobacter pylori infection        Plan:  Orders Placed This Encounter    US Abdomen Complete    pantoprazole (PROTONIX) 40 MG tablet    Case Request Endoscopy: EGD (ESOPHAGOGASTRODUODENOSCOPY)     EGD d/t upper abdominal pain and h/o H.pylori. Consider biopsy for H.pylori.   Stop Nexium  Start Protonix 40mg daily. Take 30 minutes  before first meal of the day.   Abdominal ultrasound d/t abdominal pain  Request records from [facility]      Plan of care discussed with patient who is in agreement and verbalized understanding.     I have explained the planned procedures to the patient.The risks, benefits and alternatives of the procedure were also explained in detail. Patient verbalized understanding, all questions were answered. The patient agrees to proceed as planned    Follow Up: As Needed Pending Above Workup          Coleen Rivas, MICHAEL,FNP-BC  [facility]

## 2022-08-17 ENCOUNTER — TELEPHONE (OUTPATIENT)
Dept: GASTROENTEROLOGY | Facility: CLINIC | Age: 77
End: 2022-08-17
Payer: MEDICARE

## 2022-08-17 NOTE — TELEPHONE ENCOUNTER
Spoke to patient daughter regarding her mom ultrasound results and that NP Coleen stated that Ultrasound without any abnormal findings. If symptoms continue, recommend having EGD that was previously scheduled verbal understanding.

## 2022-08-17 NOTE — TELEPHONE ENCOUNTER
----- Message from Coleen Weinstein NP sent at 8/14/2022 10:58 PM CDT -----  Ultrasound without any abnormal findings. If symptoms continue, recommend having EGD that was previously scheduled.

## 2022-09-14 ENCOUNTER — OFFICE VISIT (OUTPATIENT)
Dept: CARDIOLOGY | Facility: CLINIC | Age: 77
End: 2022-09-14
Payer: MEDICARE

## 2022-09-14 VITALS
HEIGHT: 62 IN | BODY MASS INDEX: 20.35 KG/M2 | DIASTOLIC BLOOD PRESSURE: 54 MMHG | OXYGEN SATURATION: 97 % | SYSTOLIC BLOOD PRESSURE: 90 MMHG | HEART RATE: 63 BPM | WEIGHT: 110.56 LBS

## 2022-09-14 DIAGNOSIS — E78.5 HYPERLIPIDEMIA, UNSPECIFIED HYPERLIPIDEMIA TYPE: ICD-10-CM

## 2022-09-14 DIAGNOSIS — I48.91 ATRIAL FIBRILLATION, UNSPECIFIED TYPE: ICD-10-CM

## 2022-09-14 DIAGNOSIS — I10 PRIMARY HYPERTENSION: Primary | ICD-10-CM

## 2022-09-14 PROCEDURE — 3074F PR MOST RECENT SYSTOLIC BLOOD PRESSURE < 130 MM HG: ICD-10-PCS | Mod: CPTII,S$GLB,, | Performed by: INTERNAL MEDICINE

## 2022-09-14 PROCEDURE — 1159F MED LIST DOCD IN RCRD: CPT | Mod: CPTII,S$GLB,, | Performed by: INTERNAL MEDICINE

## 2022-09-14 PROCEDURE — 99999 PR PBB SHADOW E&M-EST. PATIENT-LVL III: CPT | Mod: PBBFAC,,, | Performed by: INTERNAL MEDICINE

## 2022-09-14 PROCEDURE — 99499 UNLISTED E&M SERVICE: CPT | Mod: S$GLB,,, | Performed by: INTERNAL MEDICINE

## 2022-09-14 PROCEDURE — 99204 PR OFFICE/OUTPT VISIT, NEW, LEVL IV, 45-59 MIN: ICD-10-PCS | Mod: S$GLB,,, | Performed by: INTERNAL MEDICINE

## 2022-09-14 PROCEDURE — 3074F SYST BP LT 130 MM HG: CPT | Mod: CPTII,S$GLB,, | Performed by: INTERNAL MEDICINE

## 2022-09-14 PROCEDURE — 3078F DIAST BP <80 MM HG: CPT | Mod: CPTII,S$GLB,, | Performed by: INTERNAL MEDICINE

## 2022-09-14 PROCEDURE — 99999 PR PBB SHADOW E&M-EST. PATIENT-LVL III: ICD-10-PCS | Mod: PBBFAC,,, | Performed by: INTERNAL MEDICINE

## 2022-09-14 PROCEDURE — 1126F AMNT PAIN NOTED NONE PRSNT: CPT | Mod: CPTII,S$GLB,, | Performed by: INTERNAL MEDICINE

## 2022-09-14 PROCEDURE — 99204 OFFICE O/P NEW MOD 45 MIN: CPT | Mod: S$GLB,,, | Performed by: INTERNAL MEDICINE

## 2022-09-14 PROCEDURE — 93000 ELECTROCARDIOGRAM COMPLETE: CPT | Mod: S$GLB,,, | Performed by: INTERNAL MEDICINE

## 2022-09-14 PROCEDURE — 1159F PR MEDICATION LIST DOCUMENTED IN MEDICAL RECORD: ICD-10-PCS | Mod: CPTII,S$GLB,, | Performed by: INTERNAL MEDICINE

## 2022-09-14 PROCEDURE — 1126F PR PAIN SEVERITY QUANTIFIED, NO PAIN PRESENT: ICD-10-PCS | Mod: CPTII,S$GLB,, | Performed by: INTERNAL MEDICINE

## 2022-09-14 PROCEDURE — 99499 RISK ADDL DX/OHS AUDIT: ICD-10-PCS | Mod: S$GLB,,, | Performed by: INTERNAL MEDICINE

## 2022-09-14 PROCEDURE — 93000 EKG 12-LEAD: ICD-10-PCS | Mod: S$GLB,,, | Performed by: INTERNAL MEDICINE

## 2022-09-14 PROCEDURE — 3078F PR MOST RECENT DIASTOLIC BLOOD PRESSURE < 80 MM HG: ICD-10-PCS | Mod: CPTII,S$GLB,, | Performed by: INTERNAL MEDICINE

## 2022-09-14 RX ORDER — ESOMEPRAZOLE MAGNESIUM 40 MG/1
40 CAPSULE, DELAYED RELEASE ORAL
COMMUNITY
End: 2022-10-18 | Stop reason: SDUPTHER

## 2022-09-14 RX ORDER — CALCIUM CARBONATE 600 MG
600 TABLET ORAL ONCE
COMMUNITY
End: 2023-10-26

## 2022-09-14 RX ORDER — DOCUSATE SODIUM 100 MG/1
100 CAPSULE, LIQUID FILLED ORAL 2 TIMES DAILY
COMMUNITY

## 2022-09-14 NOTE — PROGRESS NOTES
"        Cardiology    9/14/2022  2:50 PM    Problem list  Patient Active Problem List   Diagnosis    Vaginal atrophy    Hyperlipidemia    Hypertension    Atrial fibrillation       CC:  New onset atrial fibrillation    HPI:  She was referred for atrial fibrillation.  She was scheduled to have EGD but was found to have atrial fibrillation and her procedure was canceled and referred here.  She denies any prior cardiac problems.  She denies any high blood pressure diabetes.  She denies any TIA or CVA.  She denies any bleeding or melena.  She is very active.  She volunteers at [employer] and takes care of gardening.  She is able to garden and pull weeds all day long.  She described having to undergo some sort of cardiac procedure 15 years ago at [facility] where she described going in with a catheter to "burn" something.      Medications  Current Outpatient Medications   Medication Sig Dispense Refill    aspirin (ECOTRIN) 81 MG EC tablet Take 81 mg by mouth once daily.      Ca comb no.1/vit D3/B6/FA/B12 (VITAMIN D3, CALCIUM CIT-PHOS, ORAL) Take by mouth.      calcium carbonate (OS-ALAINA) 600 mg calcium (1,500 mg) Tab Take 600 mg by mouth once.      docusate sodium (COLACE) 100 MG capsule Take 100 mg by mouth 2 (two) times daily.      esomeprazole (NEXIUM) 40 MG capsule Take 40 mg by mouth before breakfast.      metoprolol succinate (TOPROL-XL) 25 MG 24 hr tablet Take 1 tablet (25 mg total) by mouth once daily. Thuoc fady (va fish silvia) 90 tablet 3    apixaban (ELIQUIS) 5 mg Tab Take 1 tablet (5 mg total) by mouth 2 (two) times daily. 180 tablet 3    diclofenac sodium (VOLTAREN) 1 % Gel Apply 2 g topically 4 (four) times daily. 100 g 3    levocetirizine (XYZAL) 5 MG tablet Take 5 mg by mouth every evening.      PAZEO 0.7 % Drop INSTILL 1 DROP INTO RIGHT EYE ONCE DAILY ([facility]  MAT) 3 mL 1     No current facility-administered medications for this visit.      Prior to Admission medications    Medication Sig Start Date End " Date Taking? Authorizing Provider   aspirin (ECOTRIN) 81 MG EC tablet Take 81 mg by mouth once daily.   Yes Historical Provider   Ca comb no.1/vit D3/B6/FA/B12 (VITAMIN D3, CALCIUM CIT-PHOS, ORAL) Take by mouth.   Yes Historical Provider   calcium carbonate (OS-ALAINA) 600 mg calcium (1,500 mg) Tab Take 600 mg by mouth once.   Yes Historical Provider   docusate sodium (COLACE) 100 MG capsule Take 100 mg by mouth 2 (two) times daily.   Yes Historical Provider   esomeprazole (NEXIUM) 40 MG capsule Take 40 mg by mouth before breakfast.   Yes Historical Provider   metoprolol succinate (TOPROL-XL) 25 MG 24 hr tablet Take 1 tablet (25 mg total) by mouth once daily. Thuoc fady ([facility] silvia) 11/15/21  Yes Nhan Bronson MD   apixaban (ELIQUIS) 5 mg Tab Take 1 tablet (5 mg total) by mouth 2 (two) times daily. 9/14/22   Jorge Bronson MD   diclofenac sodium (VOLTAREN) 1 % Gel Apply 2 g topically 4 (four) times daily. 6/16/22   Nhan Bronson MD   levocetirizine (XYZAL) 5 MG tablet Take 5 mg by mouth every evening.    Historical Provider   PAZEO 0.7 % Drop INSTILL 1 DROP INTO RIGHT EYE ONCE DAILY ([facility]  MAT) 11/22/21   Nhan Bronson MD   pantoprazole (PROTONIX) 40 MG tablet Take 1 tablet (40 mg total) by mouth once daily. 7/29/22 9/14/22  Coleen Weinstein NP         History  Past Medical History:   Diagnosis Date    H. pylori infection     Hyperlipidemia     Hypertension     Unspecified osteoarthritis, unspecified site      No past surgical history on file.  Social History     Socioeconomic History    Marital status:    Tobacco Use    Smoking status: Never    Smokeless tobacco: Never   Substance and Sexual Activity    Alcohol use: No    Drug use: No         Allergies  Review of patient's allergies indicates:  No Known Allergies      Review of Systems   Review of Systems   Constitutional: Negative for decreased appetite, fever and weight loss.   HENT:  Negative for congestion and nosebleeds.    Eyes:  Negative for double  vision, vision loss in left eye, vision loss in right eye and visual disturbance.   Cardiovascular:  Negative for chest pain, claudication, cyanosis, dyspnea on exertion, irregular heartbeat, leg swelling, near-syncope, orthopnea, palpitations, paroxysmal nocturnal dyspnea and syncope.   Respiratory:  Negative for cough, hemoptysis, shortness of breath, sleep disturbances due to breathing, snoring, sputum production and wheezing.    Endocrine: Negative for cold intolerance and heat intolerance.   Skin:  Negative for nail changes and rash.   Musculoskeletal:  Negative for joint pain, muscle cramps, muscle weakness and myalgias.   Gastrointestinal:  Negative for change in bowel habit, heartburn, hematemesis, hematochezia, hemorrhoids and melena.   Neurological:  Negative for dizziness, focal weakness and headaches.       Physical Exam  Wt Readings from Last 1 Encounters:   09/14/22 50.1 kg (110 lb 9 oz)     BP Readings from Last 3 Encounters:   09/14/22 (!) 90/54   07/29/22 (!) 111/57   06/14/22 109/64     Pulse Readings from Last 1 Encounters:   09/14/22 63     Body mass index is 20.22 kg/m².    Physical Exam  Vitals reviewed.   Cardiovascular:      Rate and Rhythm: Normal rate. Rhythm irregularly irregular.      Pulses:           Carotid pulses are 2+ on the right side and 2+ on the left side.       Radial pulses are 2+ on the right side and 2+ on the left side.        Dorsalis pedis pulses are 2+ on the right side and 2+ on the left side.      Heart sounds: S1 normal and S2 normal.   Pulmonary:      Effort: Pulmonary effort is normal.      Breath sounds: Normal breath sounds.   Musculoskeletal:      Right lower leg: No edema.      Left lower leg: No edema.   Neurological:      Mental Status: She is alert.       EKG:  AF rate 73    Assessment  1. Primary hypertension  stable    2. Hyperlipidemia, unspecified hyperlipidemia type  stable    3. Atrial fibrillation, unspecified type  New onset, rate controlled,  [id number] = 3 (age, female)  - EKG 12-lead  - CBC Without Differential; Future  - Comprehensive Metabolic Panel; Future  - Echo; Future  - TSH; Future        Plan and Discussion  Discussed her new onset atrial fibrillation, and her José Antonio Vasc score of 3 indicating assess the for anticoagulation.  She is stable on her feet.   Will start Eliquis 5 mg twice daily.  Stop ASA 81mg qd.  Will get echocardiogram and labs.    Follow Up  2 months      Jorge Bronson MD, F.A.C.C, F.S.C.A.I.

## 2022-10-18 ENCOUNTER — HOSPITAL ENCOUNTER (OUTPATIENT)
Dept: CARDIOLOGY | Facility: HOSPITAL | Age: 77
Discharge: HOME | End: 2022-10-18
Attending: INTERNAL MEDICINE
Payer: MEDICARE

## 2022-10-18 ENCOUNTER — OFFICE VISIT (OUTPATIENT)
Dept: PRIMARY CARE CLINIC | Facility: CLINIC | Age: 77
End: 2022-10-18
Payer: MEDICARE

## 2022-10-18 VITALS
WEIGHT: 110 LBS | DIASTOLIC BLOOD PRESSURE: 72 MMHG | HEART RATE: 59 BPM | WEIGHT: 110.88 LBS | BODY MASS INDEX: 20.4 KG/M2 | HEIGHT: 62 IN | OXYGEN SATURATION: 99 % | SYSTOLIC BLOOD PRESSURE: 148 MMHG | BODY MASS INDEX: 20.24 KG/M2 | HEIGHT: 62 IN

## 2022-10-18 DIAGNOSIS — I10 BENIGN HYPERTENSION: Primary | ICD-10-CM

## 2022-10-18 DIAGNOSIS — H10.13 ALLERGIC CONJUNCTIVITIS OF BOTH EYES: ICD-10-CM

## 2022-10-18 DIAGNOSIS — Z86.19 HISTORY OF HELICOBACTER PYLORI INFECTION: ICD-10-CM

## 2022-10-18 DIAGNOSIS — I48.91 ATRIAL FIBRILLATION, UNSPECIFIED TYPE: ICD-10-CM

## 2022-10-18 DIAGNOSIS — R52 GENERALIZED PAIN: ICD-10-CM

## 2022-10-18 LAB
ASCENDING AORTA: 3.65 CM
AV INDEX (PROSTH): 0.61
AV MEAN GRADIENT: 3 MMHG
AV PEAK GRADIENT: 4 MMHG
AV VALVE AREA: 1.86 CM2
AV VELOCITY RATIO: 0.6
BSA FOR ECHO PROCEDURE: 1.48 M2
CV ECHO LV RWT: 0.27 CM
DOP CALC AO PEAK VEL: 1.05 M/S
DOP CALC AO VTI: 25.6 CM
DOP CALC LVOT AREA: 3.1 CM2
DOP CALC LVOT DIAMETER: 1.98 CM
DOP CALC LVOT PEAK VEL: 0.63 M/S
DOP CALC LVOT STROKE VOLUME: 47.7 CM3
DOP CALCLVOT PEAK VEL VTI: 15.5 CM
ECHO LV POSTERIOR WALL: 0.65 CM (ref 0.6–1.1)
EJECTION FRACTION: 51 %
FRACTIONAL SHORTENING: 26 % (ref 28–44)
INTERVENTRICULAR SEPTUM: 0.62 CM (ref 0.6–1.1)
IVC DIAMETER: 1.84 CM
LA MAJOR: 6.2 CM
LA MINOR: 6.5 CM
LA WIDTH: 4.8 CM
LEFT ATRIUM SIZE: 4.43 CM
LEFT ATRIUM VOLUME INDEX MOD: 53.5 ML/M2
LEFT ATRIUM VOLUME INDEX: 77.5 ML/M2
LEFT ATRIUM VOLUME MOD: 79.16 CM3
LEFT ATRIUM VOLUME: 114.71 CM3
LEFT INTERNAL DIMENSION IN SYSTOLE: 3.6 CM (ref 2.1–4)
LEFT VENTRICLE DIASTOLIC VOLUME INDEX: 74.46 ML/M2
LEFT VENTRICLE DIASTOLIC VOLUME: 110.2 ML
LEFT VENTRICLE MASS INDEX: 65 G/M2
LEFT VENTRICLE SYSTOLIC VOLUME INDEX: 36.8 ML/M2
LEFT VENTRICLE SYSTOLIC VOLUME: 54.5 ML
LEFT VENTRICULAR INTERNAL DIMENSION IN DIASTOLE: 4.85 CM (ref 3.5–6)
LEFT VENTRICULAR MASS: 96.39 G
LVOT MG: 0.86 MMHG
LVOT MV: 0.44 CM/S
PISA TR MAX VEL: 2.87 M/S
PULM VEIN S/D RATIO: 0.49
PV PEAK D VEL: 0.65 M/S
PV PEAK S VEL: 0.32 M/S
PV PEAK VELOCITY: 0.65 CM/S
RA MAJOR: 6.55 CM
RA PRESSURE: 3 MMHG
RA WIDTH: 3.4 CM
RIGHT VENTRICULAR END-DIASTOLIC DIMENSION: 3.66 CM
RV TISSUE DOPPLER FREE WALL SYSTOLIC VELOCITY 1 (APICAL 4 CHAMBER VIEW): 10 CM/S
SINUS: 3.1 CM
STJ: 2.32 CM
TR MAX PG: 33 MMHG
TRICUSPID ANNULAR PLANE SYSTOLIC EXCURSION: 1.7 CM
TV REST PULMONARY ARTERY PRESSURE: 36 MMHG

## 2022-10-18 PROCEDURE — 99214 PR OFFICE/OUTPT VISIT, EST, LEVL IV, 30-39 MIN: ICD-10-PCS | Mod: S$GLB,,, | Performed by: FAMILY MEDICINE

## 2022-10-18 PROCEDURE — 3078F PR MOST RECENT DIASTOLIC BLOOD PRESSURE < 80 MM HG: ICD-10-PCS | Mod: CPTII,S$GLB,, | Performed by: FAMILY MEDICINE

## 2022-10-18 PROCEDURE — 1160F PR REVIEW ALL MEDS BY PRESCRIBER/CLIN PHARMACIST DOCUMENTED: ICD-10-PCS | Mod: CPTII,S$GLB,, | Performed by: FAMILY MEDICINE

## 2022-10-18 PROCEDURE — 1101F PR PT FALLS ASSESS DOC 0-1 FALLS W/OUT INJ PAST YR: ICD-10-PCS | Mod: CPTII,S$GLB,, | Performed by: FAMILY MEDICINE

## 2022-10-18 PROCEDURE — 3077F SYST BP >= 140 MM HG: CPT | Mod: CPTII,S$GLB,, | Performed by: FAMILY MEDICINE

## 2022-10-18 PROCEDURE — 1125F AMNT PAIN NOTED PAIN PRSNT: CPT | Mod: CPTII,S$GLB,, | Performed by: FAMILY MEDICINE

## 2022-10-18 PROCEDURE — 1159F MED LIST DOCD IN RCRD: CPT | Mod: CPTII,S$GLB,, | Performed by: FAMILY MEDICINE

## 2022-10-18 PROCEDURE — 93306 TTE W/DOPPLER COMPLETE: CPT | Mod: 26,,, | Performed by: INTERNAL MEDICINE

## 2022-10-18 PROCEDURE — 3077F PR MOST RECENT SYSTOLIC BLOOD PRESSURE >= 140 MM HG: ICD-10-PCS | Mod: CPTII,S$GLB,, | Performed by: FAMILY MEDICINE

## 2022-10-18 PROCEDURE — 1160F RVW MEDS BY RX/DR IN RCRD: CPT | Mod: CPTII,S$GLB,, | Performed by: FAMILY MEDICINE

## 2022-10-18 PROCEDURE — 3288F PR FALLS RISK ASSESSMENT DOCUMENTED: ICD-10-PCS | Mod: CPTII,S$GLB,, | Performed by: FAMILY MEDICINE

## 2022-10-18 PROCEDURE — 93306 ECHO (CUPID ONLY): ICD-10-PCS | Mod: 26,,, | Performed by: INTERNAL MEDICINE

## 2022-10-18 PROCEDURE — 1159F PR MEDICATION LIST DOCUMENTED IN MEDICAL RECORD: ICD-10-PCS | Mod: CPTII,S$GLB,, | Performed by: FAMILY MEDICINE

## 2022-10-18 PROCEDURE — 99999 PR PBB SHADOW E&M-EST. PATIENT-LVL III: ICD-10-PCS | Mod: PBBFAC,,, | Performed by: FAMILY MEDICINE

## 2022-10-18 PROCEDURE — 99214 OFFICE O/P EST MOD 30 MIN: CPT | Mod: S$GLB,,, | Performed by: FAMILY MEDICINE

## 2022-10-18 PROCEDURE — 3078F DIAST BP <80 MM HG: CPT | Mod: CPTII,S$GLB,, | Performed by: FAMILY MEDICINE

## 2022-10-18 PROCEDURE — 3288F FALL RISK ASSESSMENT DOCD: CPT | Mod: CPTII,S$GLB,, | Performed by: FAMILY MEDICINE

## 2022-10-18 PROCEDURE — 1125F PR PAIN SEVERITY QUANTIFIED, PAIN PRESENT: ICD-10-PCS | Mod: CPTII,S$GLB,, | Performed by: FAMILY MEDICINE

## 2022-10-18 PROCEDURE — 93306 TTE W/DOPPLER COMPLETE: CPT | Mod: PN

## 2022-10-18 PROCEDURE — 1101F PT FALLS ASSESS-DOCD LE1/YR: CPT | Mod: CPTII,S$GLB,, | Performed by: FAMILY MEDICINE

## 2022-10-18 PROCEDURE — 99999 PR PBB SHADOW E&M-EST. PATIENT-LVL III: CPT | Mod: PBBFAC,,, | Performed by: FAMILY MEDICINE

## 2022-10-18 RX ORDER — LEVOCETIRIZINE DIHYDROCHLORIDE 5 MG/1
5 TABLET, FILM COATED ORAL NIGHTLY
Qty: 90 TABLET | Refills: 3 | Status: SHIPPED | OUTPATIENT
Start: 2022-10-18 | End: 2023-10-26 | Stop reason: SDUPTHER

## 2022-10-18 RX ORDER — ESOMEPRAZOLE MAGNESIUM 40 MG/1
40 CAPSULE, DELAYED RELEASE ORAL
Qty: 90 CAPSULE | Refills: 3 | Status: SHIPPED | OUTPATIENT
Start: 2022-10-18 | End: 2023-10-26 | Stop reason: SDUPTHER

## 2022-10-18 RX ORDER — METOPROLOL SUCCINATE 25 MG/1
25 TABLET, EXTENDED RELEASE ORAL DAILY
Qty: 90 TABLET | Refills: 3 | Status: SHIPPED | OUTPATIENT
Start: 2022-10-18 | End: 2023-10-26 | Stop reason: SDUPTHER

## 2022-10-18 RX ORDER — DICLOFENAC SODIUM 10 MG/G
2 GEL TOPICAL 4 TIMES DAILY
Qty: 100 G | Refills: 3 | Status: SHIPPED | OUTPATIENT
Start: 2022-10-18

## 2022-10-18 NOTE — PROGRESS NOTES
Clinic Note  10/18/2022      Subjective:       Patient ID:  Louis is a 77 y.o. female being seen for an established visit.    Chief Complaint: Follow-up    Patient-needs a refill of her metoprolol    Atrial fibrillation-recently was supposed to get EGD, however was found to have atrial fibrillation.  Has been following up with Cardiology since then, just had a echocardiogram done this morning and labs.  Has a follow-up with cardiology next week.  Currently taking metoprolol, recently started on Eliquis, stopped her aspirin.    Allergic conjunctivitis-needs a refill of her allergy eyedrops and Xyzal    Generalized pain and arthritis-needs a refill of her topical diclofenac gel    GERD/history of H pylori-was supposed to get her EGD but the procedure was canceled due to new onset atrial fibrillation.    Review of Systems   Constitutional:  Negative for chills, fever, malaise/fatigue and weight loss.   HENT:  Negative for congestion, sinus pain and sore throat.    Respiratory:  Negative for cough, shortness of breath and wheezing.    Cardiovascular:  Negative for chest pain and palpitations.   Gastrointestinal:  Negative for constipation, diarrhea, nausea and vomiting.   Genitourinary:  Negative for dysuria, frequency and urgency.   Musculoskeletal:  Negative for myalgias.   Skin:  Negative for rash.   Neurological:  Negative for headaches.     Medication List with Changes/Refills   Current Medications    APIXABAN (ELIQUIS) 5 MG TAB    Take 1 tablet (5 mg total) by mouth 2 (two) times daily.    CA COMB NO.1/VIT D3/B6/FA/B12 (VITAMIN D3, CALCIUM CIT-PHOS, ORAL)    Take by mouth.    CALCIUM CARBONATE (OS-ALAINA) 600 MG CALCIUM (1,500 MG) TAB    Take 600 mg by mouth once.    DOCUSATE SODIUM (COLACE) 100 MG CAPSULE    Take 100 mg by mouth 2 (two) times daily.   Changed and/or Refilled Medications    Modified Medication Previous Medication    DICLOFENAC SODIUM (VOLTAREN) 1 % GEL diclofenac sodium (VOLTAREN) 1 % Gel       Apply  "2 g topically 4 (four) times daily.    Apply 2 g topically 4 (four) times daily.    ESOMEPRAZOLE (NEXIUM) 40 MG CAPSULE esomeprazole (NEXIUM) 40 MG capsule       Take 1 capsule (40 mg total) by mouth before breakfast. Thuoc melissa tu    Take 40 mg by mouth before breakfast.    LEVOCETIRIZINE (XYZAL) 5 MG TABLET levocetirizine (XYZAL) 5 MG tablet       Take 1 tablet (5 mg total) by mouth every evening. Allergy / di neel    Take 5 mg by mouth every evening.    METOPROLOL SUCCINATE (TOPROL-XL) 25 MG 24 HR TABLET metoprolol succinate (TOPROL-XL) 25 MG 24 hr tablet       Take 1 tablet (25 mg total) by mouth once daily. Thuoc fady (va fish silvia)    Take 1 tablet (25 mg total) by mouth once daily. Thuoc fady (va fish silvia)    OLOPATADINE (PAZEO) 0.7 % DROP PAZEO 0.7 % Drop       Place 1 drop into both eyes daily as needed (ngua mat).    INSTILL 1 DROP INTO RIGHT EYE ONCE DAILY (THUOC  MAT)   Discontinued Medications    ASPIRIN (ECOTRIN) 81 MG EC TABLET    Take 81 mg by mouth once daily.       Patient Active Problem List   Diagnosis    Vaginal atrophy    Hyperlipidemia    Hypertension    Atrial fibrillation           Objective:      BP (!) 148/72   Pulse (!) 59   Ht 5' 2" (1.575 m)   Wt 50.3 kg (110 lb 14.3 oz)   LMP  (LMP Unknown)   SpO2 99%   BMI 20.28 kg/m²   Estimated body mass index is 20.28 kg/m² as calculated from the following:    Height as of this encounter: 5' 2" (1.575 m).    Weight as of this encounter: 50.3 kg (110 lb 14.3 oz).  Physical Exam  Vitals reviewed.   Constitutional:       General: She is not in acute distress.     Appearance: She is not diaphoretic.   HENT:      Head: Normocephalic and atraumatic.   Eyes:      Conjunctiva/sclera: Conjunctivae normal.   Cardiovascular:      Rate and Rhythm: Normal rate. Rhythm irregular.      Heart sounds: Normal heart sounds.   Pulmonary:      Effort: Pulmonary effort is normal. No respiratory distress.      Breath sounds: Normal breath sounds. No wheezing.   Abdominal: "      General: Bowel sounds are normal.      Palpations: Abdomen is soft.   Musculoskeletal:         General: Normal range of motion.      Cervical back: Normal range of motion.   Skin:     General: Skin is warm and dry.      Findings: No erythema or rash.   Neurological:      Mental Status: She is alert and oriented to person, place, and time.   Psychiatric:         Mood and Affect: Mood and affect normal.         Behavior: Behavior normal.         Thought Content: Thought content normal.         Judgment: Judgment normal.         Assessment and Plan:     1. Benign hypertension  - blood pressure slightly elevated today 148/72, however patient did not take her metoprolol today.  Has follow-up with cardiology next week, will follow-up blood pressures then  - metoprolol succinate (TOPROL-XL) 25 MG 24 hr tablet; Take 1 tablet (25 mg total) by mouth once daily. Thuoc fady (lizzy fish silvia)  Dispense: 90 tablet; Refill: 3    2. Atrial fibrillation, unspecified type  - being followed by Cardiology for new onset atrial fibrillation, continue metoprolol 25 mg q.d., Eliquis 5 mg b.i.d., follow-up with results of echo and labs    3. Allergic conjunctivitis of both eyes  - olopatadine (PAZEO) 0.7 % Drop; Place 1 drop into both eyes daily as needed (ngua mat).  Dispense: 5 mL; Refill: 1  - levocetirizine (XYZAL) 5 MG tablet; Take 1 tablet (5 mg total) by mouth every evening. Allergy / di neel  Dispense: 90 tablet; Refill: 3    4. Generalized pain  - diclofenac sodium (VOLTAREN) 1 % Gel; Apply 2 g topically 4 (four) times daily.  Dispense: 100 g; Refill: 3    5. History of Helicobacter pylori infection  - will need to reschedule EGD after completion of her workup for atrial fibrillation  - esomeprazole (NEXIUM) 40 MG capsule; Take 1 capsule (40 mg total) by mouth before breakfast. Thuoc melissa tu  Dispense: 90 capsule; Refill: 3      Follow Up:   Follow up in about 4 months (around 2/18/2023) for follow-up.    Other Orders Placed This  Visit:  No orders of the defined types were placed in this encounter.        Nhan Bronson MD        This note is dictated on M*Modal word recognition program.  There are word recognition mistakes that are occasionally missed on review.

## 2022-10-24 DIAGNOSIS — H10.13 ALLERGIC CONJUNCTIVITIS OF BOTH EYES: ICD-10-CM

## 2022-10-26 RX ORDER — AZELASTINE HYDROCHLORIDE 0.5 MG/ML
1 SOLUTION/ DROPS OPHTHALMIC 2 TIMES DAILY
Qty: 6 ML | Refills: 3 | Status: SHIPPED | OUTPATIENT
Start: 2022-10-26 | End: 2023-02-23 | Stop reason: SDUPTHER

## 2022-11-16 ENCOUNTER — OFFICE VISIT (OUTPATIENT)
Dept: CARDIOLOGY | Facility: CLINIC | Age: 77
End: 2022-11-16
Payer: MEDICARE

## 2022-11-16 VITALS
OXYGEN SATURATION: 98 % | BODY MASS INDEX: 20.8 KG/M2 | DIASTOLIC BLOOD PRESSURE: 60 MMHG | HEIGHT: 62 IN | SYSTOLIC BLOOD PRESSURE: 98 MMHG | WEIGHT: 113 LBS | HEART RATE: 102 BPM

## 2022-11-16 DIAGNOSIS — I48.91 ATRIAL FIBRILLATION, UNSPECIFIED TYPE: Primary | ICD-10-CM

## 2022-11-16 DIAGNOSIS — I10 PRIMARY HYPERTENSION: ICD-10-CM

## 2022-11-16 DIAGNOSIS — E78.5 HYPERLIPIDEMIA, UNSPECIFIED HYPERLIPIDEMIA TYPE: ICD-10-CM

## 2022-11-16 PROCEDURE — 3074F PR MOST RECENT SYSTOLIC BLOOD PRESSURE < 130 MM HG: ICD-10-PCS | Mod: CPTII,S$GLB,, | Performed by: INTERNAL MEDICINE

## 2022-11-16 PROCEDURE — 99214 OFFICE O/P EST MOD 30 MIN: CPT | Mod: S$GLB,,, | Performed by: INTERNAL MEDICINE

## 2022-11-16 PROCEDURE — 1159F MED LIST DOCD IN RCRD: CPT | Mod: CPTII,S$GLB,, | Performed by: INTERNAL MEDICINE

## 2022-11-16 PROCEDURE — 99999 PR PBB SHADOW E&M-EST. PATIENT-LVL III: ICD-10-PCS | Mod: PBBFAC,,, | Performed by: INTERNAL MEDICINE

## 2022-11-16 PROCEDURE — 1125F PR PAIN SEVERITY QUANTIFIED, PAIN PRESENT: ICD-10-PCS | Mod: CPTII,S$GLB,, | Performed by: INTERNAL MEDICINE

## 2022-11-16 PROCEDURE — 3078F PR MOST RECENT DIASTOLIC BLOOD PRESSURE < 80 MM HG: ICD-10-PCS | Mod: CPTII,S$GLB,, | Performed by: INTERNAL MEDICINE

## 2022-11-16 PROCEDURE — 1159F PR MEDICATION LIST DOCUMENTED IN MEDICAL RECORD: ICD-10-PCS | Mod: CPTII,S$GLB,, | Performed by: INTERNAL MEDICINE

## 2022-11-16 PROCEDURE — 1125F AMNT PAIN NOTED PAIN PRSNT: CPT | Mod: CPTII,S$GLB,, | Performed by: INTERNAL MEDICINE

## 2022-11-16 PROCEDURE — 99999 PR PBB SHADOW E&M-EST. PATIENT-LVL III: CPT | Mod: PBBFAC,,, | Performed by: INTERNAL MEDICINE

## 2022-11-16 PROCEDURE — 99214 PR OFFICE/OUTPT VISIT, EST, LEVL IV, 30-39 MIN: ICD-10-PCS | Mod: S$GLB,,, | Performed by: INTERNAL MEDICINE

## 2022-11-16 PROCEDURE — 3074F SYST BP LT 130 MM HG: CPT | Mod: CPTII,S$GLB,, | Performed by: INTERNAL MEDICINE

## 2022-11-16 PROCEDURE — 3078F DIAST BP <80 MM HG: CPT | Mod: CPTII,S$GLB,, | Performed by: INTERNAL MEDICINE

## 2022-11-16 NOTE — PROGRESS NOTES
Cardiology    11/16/2022  9:30 AM    Problem list  Patient Active Problem List   Diagnosis    Vaginal atrophy    Hyperlipidemia    Hypertension    Atrial fibrillation       CC:  F/u    HPI:  Doing well.  No CP, SOB, PND, palpitations, syncope, dizziness.  Remains active, gardening all day long.    Medications  Current Outpatient Medications   Medication Sig Dispense Refill    apixaban (ELIQUIS) 5 mg Tab Take 1 tablet (5 mg total) by mouth 2 (two) times daily. 180 tablet 3    Ca comb no.1/vit D3/B6/FA/B12 (VITAMIN D3, CALCIUM CIT-PHOS, ORAL) Take by mouth.      calcium carbonate (OS-ALAINA) 600 mg calcium (1,500 mg) Tab Take 600 mg by mouth once.      docusate sodium (COLACE) 100 MG capsule Take 100 mg by mouth 2 (two) times daily.      esomeprazole (NEXIUM) 40 MG capsule Take 1 capsule (40 mg total) by mouth before breakfast. Thuoc melissa tu 90 capsule 3    levocetirizine (XYZAL) 5 MG tablet Take 1 tablet (5 mg total) by mouth every evening. Allergy / di neel 90 tablet 3    metoprolol succinate (TOPROL-XL) 25 MG 24 hr tablet Take 1 tablet (25 mg total) by mouth once daily. Thuoc fady (va fish silvia) 90 tablet 3    azelastine (OPTIVAR) 0.05 % ophthalmic solution Place 1 drop into both eyes 2 (two) times daily. 6 mL 3    diclofenac sodium (VOLTAREN) 1 % Gel Apply 2 g topically 4 (four) times daily. 100 g 3    olopatadine (PAZEO) 0.7 % Drop Place 1 drop into both eyes daily as needed (ngua mat). 5 mL 1     No current facility-administered medications for this visit.      Prior to Admission medications    Medication Sig Start Date End Date Taking? Authorizing Provider   apixaban (ELIQUIS) 5 mg Tab Take 1 tablet (5 mg total) by mouth 2 (two) times daily. 9/14/22  Yes Jorge Bronson MD   Ca comb no.1/vit D3/B6/FA/B12 (VITAMIN D3, CALCIUM CIT-PHOS, ORAL) Take by mouth.   Yes Historical Provider   calcium carbonate (OS-ALAINA) 600 mg calcium (1,500 mg) Tab Take 600 mg by mouth once.   Yes Historical Provider   docusate sodium  (COLACE) 100 MG capsule Take 100 mg by mouth 2 (two) times daily.   Yes Historical Provider   esomeprazole (NEXIUM) 40 MG capsule Take 1 capsule (40 mg total) by mouth before breakfast. [facility] melissa tu 10/18/22  Yes Nhan Bronson MD   levocetirizine (XYZAL) 5 MG tablet Take 1 tablet (5 mg total) by mouth every evening. Allergy / di neel 10/18/22  Yes Nhan Bronson MD   metoprolol succinate (TOPROL-XL) 25 MG 24 hr tablet Take 1 tablet (25 mg total) by mouth once daily. [facility] fady (va fish silvia) 10/18/22  Yes Nhan Bronson MD   azelastine (OPTIVAR) 0.05 % ophthalmic solution Place 1 drop into both eyes 2 (two) times daily. 10/26/22 10/26/23  Nhan Bronson MD   diclofenac sodium (VOLTAREN) 1 % Gel Apply 2 g topically 4 (four) times daily. 10/18/22   Nhan Bronson MD   olopatadine (PAZEO) 0.7 % Drop Place 1 drop into both eyes daily as needed (ngua mat). 10/18/22   Nhan Bronson MD         History  Past Medical History:   Diagnosis Date    H. pylori infection     Hyperlipidemia     Hypertension     Unspecified osteoarthritis, unspecified site      No past surgical history on file.  Social History     Socioeconomic History    Marital status:    Tobacco Use    Smoking status: Never    Smokeless tobacco: Never   Substance and Sexual Activity    Alcohol use: No    Drug use: No    Sexual activity: Not Currently         Allergies  Review of patient's allergies indicates:  No Known Allergies      Review of Systems   Review of Systems   Constitutional: Negative for decreased appetite, fever and weight loss.   HENT:  Negative for congestion and nosebleeds.    Eyes:  Negative for double vision, vision loss in left eye, vision loss in right eye and visual disturbance.   Cardiovascular:  Negative for chest pain, claudication, cyanosis, dyspnea on exertion, irregular heartbeat, leg swelling, near-syncope, orthopnea, palpitations, paroxysmal nocturnal dyspnea and syncope.   Respiratory:  Negative for cough, hemoptysis,  shortness of breath, sleep disturbances due to breathing, snoring, sputum production and wheezing.    Endocrine: Negative for cold intolerance and heat intolerance.   Skin:  Negative for nail changes and rash.   Musculoskeletal:  Negative for joint pain, muscle cramps, muscle weakness and myalgias.   Gastrointestinal:  Negative for change in bowel habit, heartburn, hematemesis, hematochezia, hemorrhoids and melena.   Neurological:  Negative for dizziness, focal weakness and headaches.       Physical Exam  Wt Readings from Last 1 Encounters:   11/16/22 51.2 kg (112 lb 15.8 oz)     BP Readings from Last 3 Encounters:   11/16/22 98/60   10/18/22 (!) 148/72   09/14/22 (!) 90/54     Pulse Readings from Last 1 Encounters:   11/16/22 102     Body mass index is 20.67 kg/m².    Physical Exam  Vitals reviewed.   Cardiovascular:      Rate and Rhythm: Normal rate. Rhythm irregularly irregular.      Pulses:           Carotid pulses are 2+ on the right side and 2+ on the left side.       Radial pulses are 2+ on the right side and 2+ on the left side.        Dorsalis pedis pulses are 2+ on the right side and 2+ on the left side.      Heart sounds: S1 normal and S2 normal.   Pulmonary:      Effort: Pulmonary effort is normal.      Breath sounds: Normal breath sounds.   Musculoskeletal:      Right lower leg: No edema.      Left lower leg: No edema.   Neurological:      Mental Status: She is alert.           Assessment  1. Atrial fibrillation, unspecified type  Stable, on metoprolol and Eliquis    2. Primary hypertension  controlled    3. Hyperlipidemia, unspecified hyperlipidemia type  stable        Plan and Discussion  Encourage to stay hydrated and not skipping meals.  Continue current meds.  Ok to proceed with endoscopy with Dr Becerra.  Ok to hold Eliquis 48 hours prior to procedure.    Follow Up  2 months      Jorge Bronson MD, F.A.C.C, F.S.C.A.I.        35 minutes were spent in chart review, documentation and review of  results, and evaluation, treatment, and counseling of patient on the same day of service.

## 2022-11-17 ENCOUNTER — TELEPHONE (OUTPATIENT)
Dept: GASTROENTEROLOGY | Facility: CLINIC | Age: 77
End: 2022-11-17
Payer: MEDICARE

## 2022-11-17 DIAGNOSIS — R10.13 EPIGASTRIC PAIN: ICD-10-CM

## 2022-11-17 DIAGNOSIS — Z86.19 HISTORY OF HELICOBACTER PYLORI INFECTION: Primary | ICD-10-CM

## 2022-11-17 NOTE — TELEPHONE ENCOUNTER
Spoke to her daughter and she mentioned that the pt throat still bothers her and wants to see if she can still get an EGD done at [facility] if possible. Informed I will pass the message to [facility] department and they will contact her for scheduling. Verbalized understanding and thanked me for calling them.

## 2022-11-17 NOTE — TELEPHONE ENCOUNTER
New case request entered for EGD. Will have [facility] call patient to schedule.     ----- Message from Henrique Hernandez MA sent at 11/17/2022  3:52 PM CST -----  Regarding: FW: follow up appointment needed  I spoke to her daughter and she said that the pt throat stills bothers her and wanted to see if she can still get an EGD since she got an cardio clearance now. I offered the one and chanell but she said [facility] will be better.   ----- Message -----  From: Coleen Weinstein NP  Sent: 11/17/2022  10:01 AM CST  To: Evelyn Horne Staff  Subject: FW: follow up appointment needed                 Can you please reach out to patient and see what she needs a follow up for? She was scheduled for an EGD in [place] and canceled. If she needs an appointment, is she able to come to [place]? Thanks  ----- Message -----  From: José Becerra MD  Sent: 11/17/2022   8:50 AM CST  To: Coleen Weinstein NP, Johana Forde LPN  Subject: RE: follow up appointment needed                 Looks like patient has seen coleen before, will defer to her...  Coleen, can you look into this, happy to see this patient if you feel she needs to see me, otherwise just follow up with you routinely    ----- Message -----  From: Johana Forde LPN  Sent: 11/17/2022   8:38 AM CST  To: José Becerra MD  Subject: FW: follow up appointment needed                 When would you like to follow up with this pt?  ----- Message -----  From: Penny Perez RN  Sent: 11/16/2022   9:36 AM CST  To: Mariam RENDON Staff  Subject: follow up appointment needed                     Good morning,    Please contact pt to schedule a follow up appointment with Dr. Becerra.    Thank you!    Penny Perez RN, BSN  [facility] [address], [address]  [place] [address]  Phone: [phone]  Fax: [phone]

## 2022-11-18 ENCOUNTER — TELEPHONE (OUTPATIENT)
Dept: GASTROENTEROLOGY | Facility: CLINIC | Age: 77
End: 2022-11-18
Payer: MEDICARE

## 2022-11-18 ENCOUNTER — PATIENT MESSAGE (OUTPATIENT)
Dept: GASTROENTEROLOGY | Facility: CLINIC | Age: 77
End: 2022-11-18
Payer: MEDICARE

## 2022-11-18 NOTE — TELEPHONE ENCOUNTER
Spoke to pt daughter and informed her about EGD scheduled on 11/23/22 with Dr. Becerra. Went over instructions which was also sent in the portal. Verbalized understanding.

## 2023-01-17 ENCOUNTER — OFFICE VISIT (OUTPATIENT)
Dept: CARDIOLOGY | Facility: CLINIC | Age: 78
End: 2023-01-17
Payer: MEDICARE

## 2023-01-17 VITALS
HEART RATE: 95 BPM | WEIGHT: 118.5 LBS | SYSTOLIC BLOOD PRESSURE: 96 MMHG | DIASTOLIC BLOOD PRESSURE: 60 MMHG | OXYGEN SATURATION: 97 % | BODY MASS INDEX: 23.26 KG/M2 | HEIGHT: 60 IN

## 2023-01-17 DIAGNOSIS — E78.5 HYPERLIPIDEMIA, UNSPECIFIED HYPERLIPIDEMIA TYPE: ICD-10-CM

## 2023-01-17 DIAGNOSIS — I48.91 ATRIAL FIBRILLATION, UNSPECIFIED TYPE: ICD-10-CM

## 2023-01-17 DIAGNOSIS — I10 PRIMARY HYPERTENSION: Primary | ICD-10-CM

## 2023-01-17 PROCEDURE — 99999 PR PBB SHADOW E&M-EST. PATIENT-LVL III: CPT | Mod: PBBFAC,,, | Performed by: INTERNAL MEDICINE

## 2023-01-17 PROCEDURE — 99499 RISK ADDL DX/OHS AUDIT: ICD-10-PCS | Mod: S$GLB,,, | Performed by: INTERNAL MEDICINE

## 2023-01-17 PROCEDURE — 1159F PR MEDICATION LIST DOCUMENTED IN MEDICAL RECORD: ICD-10-PCS | Mod: CPTII,S$GLB,, | Performed by: INTERNAL MEDICINE

## 2023-01-17 PROCEDURE — 3078F PR MOST RECENT DIASTOLIC BLOOD PRESSURE < 80 MM HG: ICD-10-PCS | Mod: CPTII,S$GLB,, | Performed by: INTERNAL MEDICINE

## 2023-01-17 PROCEDURE — 99214 OFFICE O/P EST MOD 30 MIN: CPT | Mod: S$GLB,,, | Performed by: INTERNAL MEDICINE

## 2023-01-17 PROCEDURE — 99214 PR OFFICE/OUTPT VISIT, EST, LEVL IV, 30-39 MIN: ICD-10-PCS | Mod: S$GLB,,, | Performed by: INTERNAL MEDICINE

## 2023-01-17 PROCEDURE — 3074F PR MOST RECENT SYSTOLIC BLOOD PRESSURE < 130 MM HG: ICD-10-PCS | Mod: CPTII,S$GLB,, | Performed by: INTERNAL MEDICINE

## 2023-01-17 PROCEDURE — 99499 UNLISTED E&M SERVICE: CPT | Mod: S$GLB,,, | Performed by: INTERNAL MEDICINE

## 2023-01-17 PROCEDURE — 1126F PR PAIN SEVERITY QUANTIFIED, NO PAIN PRESENT: ICD-10-PCS | Mod: CPTII,S$GLB,, | Performed by: INTERNAL MEDICINE

## 2023-01-17 PROCEDURE — 3074F SYST BP LT 130 MM HG: CPT | Mod: CPTII,S$GLB,, | Performed by: INTERNAL MEDICINE

## 2023-01-17 PROCEDURE — 99999 PR PBB SHADOW E&M-EST. PATIENT-LVL III: ICD-10-PCS | Mod: PBBFAC,,, | Performed by: INTERNAL MEDICINE

## 2023-01-17 PROCEDURE — 1126F AMNT PAIN NOTED NONE PRSNT: CPT | Mod: CPTII,S$GLB,, | Performed by: INTERNAL MEDICINE

## 2023-01-17 PROCEDURE — 1159F MED LIST DOCD IN RCRD: CPT | Mod: CPTII,S$GLB,, | Performed by: INTERNAL MEDICINE

## 2023-01-17 PROCEDURE — 3078F DIAST BP <80 MM HG: CPT | Mod: CPTII,S$GLB,, | Performed by: INTERNAL MEDICINE

## 2023-01-17 NOTE — PROGRESS NOTES
Cardiology    1/17/2023  10:35 AM    Problem list  Patient Active Problem List   Diagnosis    Vaginal atrophy    Hyperlipidemia    Hypertension    Atrial fibrillation       CC:  F/u    HPI:  She is doing well.  She remains very active and is growing vegetables in her garden.  She denies any chest pain, shortness of breath, palpitation, bleeding.    Medications  Current Outpatient Medications   Medication Sig Dispense Refill    apixaban (ELIQUIS) 5 mg Tab Take 1 tablet (5 mg total) by mouth 2 (two) times daily. 180 tablet 3    azelastine (OPTIVAR) 0.05 % ophthalmic solution Place 1 drop into both eyes 2 (two) times daily. 6 mL 3    Ca comb no.1/vit D3/B6/FA/B12 (VITAMIN D3, CALCIUM CIT-PHOS, ORAL) Take by mouth.      calcium carbonate (OS-ALAINA) 600 mg calcium (1,500 mg) Tab Take 600 mg by mouth once.      diclofenac sodium (VOLTAREN) 1 % Gel Apply 2 g topically 4 (four) times daily. 100 g 3    docusate sodium (COLACE) 100 MG capsule Take 100 mg by mouth 2 (two) times daily.      esomeprazole (NEXIUM) 40 MG capsule Take 1 capsule (40 mg total) by mouth before breakfast. Thuoc melissa tu 90 capsule 3    levocetirizine (XYZAL) 5 MG tablet Take 1 tablet (5 mg total) by mouth every evening. Allergy / di neel 90 tablet 3    metoprolol succinate (TOPROL-XL) 25 MG 24 hr tablet Take 1 tablet (25 mg total) by mouth once daily. Thuoc fady (va fish silvia) 90 tablet 3    olopatadine (PAZEO) 0.7 % Drop Place 1 drop into both eyes daily as needed (ngua mat). 5 mL 1     No current facility-administered medications for this visit.      Prior to Admission medications    Medication Sig Start Date End Date Taking? Authorizing Provider   apixaban (ELIQUIS) 5 mg Tab Take 1 tablet (5 mg total) by mouth 2 (two) times daily. 9/14/22  Yes Jorge Bronson MD   azelastine (OPTIVAR) 0.05 % ophthalmic solution Place 1 drop into both eyes 2 (two) times daily. 10/26/22 10/26/23 Yes Nhan Bronson MD   Ca comb no.1/vit D3/B6/FA/B12 (VITAMIN D3,  CALCIUM CIT-PHOS, ORAL) Take by mouth.   Yes Historical Provider   calcium carbonate (OS-ALAINA) 600 mg calcium (1,500 mg) Tab Take 600 mg by mouth once.   Yes Historical Provider   diclofenac sodium (VOLTAREN) 1 % Gel Apply 2 g topically 4 (four) times daily. 10/18/22  Yes Nhan Bronson MD   docusate sodium (COLACE) 100 MG capsule Take 100 mg by mouth 2 (two) times daily.   Yes Historical Provider   esomeprazole (NEXIUM) 40 MG capsule Take 1 capsule (40 mg total) by mouth before breakfast. Thuoc melissa tu 10/18/22  Yes Nhan Bronson MD   levocetirizine (XYZAL) 5 MG tablet Take 1 tablet (5 mg total) by mouth every evening. Allergy / di neel 10/18/22  Yes Nhan Bronson MD   metoprolol succinate (TOPROL-XL) 25 MG 24 hr tablet Take 1 tablet (25 mg total) by mouth once daily. Thuoc fady (va fish silvia) 10/18/22  Yes Nhan Bronson MD   olopatadine (PAZEO) 0.7 % Drop Place 1 drop into both eyes daily as needed (ngua mat). 10/18/22  Yes Nhan Bronson MD         History  Past Medical History:   Diagnosis Date    H. pylori infection     Hyperlipidemia     Hypertension     Unspecified osteoarthritis, unspecified site      Past Surgical History:   Procedure Laterality Date    ESOPHAGOGASTRODUODENOSCOPY N/A 11/23/2022    Procedure: EGD (ESOPHAGOGASTRODUODENOSCOPY);  Surgeon: José Becerra MD;  Location: [facility];  Service: Endoscopy;  Laterality: N/A;    UPPER GASTROINTESTINAL ENDOSCOPY  11/23/2022     Social History     Socioeconomic History    Marital status:    Tobacco Use    Smoking status: Never    Smokeless tobacco: Never   Substance and Sexual Activity    Alcohol use: No    Drug use: No    Sexual activity: Not Currently         Allergies  Review of patient's allergies indicates:  No Known Allergies      Review of Systems   Review of Systems   Constitutional: Negative for decreased appetite, fever and weight loss.   HENT:  Negative for congestion and nosebleeds.    Eyes:  Negative for double vision, vision loss in  left eye, vision loss in right eye and visual disturbance.   Cardiovascular:  Negative for chest pain, claudication, cyanosis, dyspnea on exertion, irregular heartbeat, leg swelling, near-syncope, orthopnea, palpitations, paroxysmal nocturnal dyspnea and syncope.   Respiratory:  Negative for cough, hemoptysis, shortness of breath, sleep disturbances due to breathing, snoring, sputum production and wheezing.    Endocrine: Negative for cold intolerance and heat intolerance.   Skin:  Negative for nail changes and rash.   Musculoskeletal:  Negative for joint pain, muscle cramps, muscle weakness and myalgias.   Gastrointestinal:  Negative for change in bowel habit, heartburn, hematemesis, hematochezia, hemorrhoids and melena.   Neurological:  Negative for dizziness, focal weakness and headaches.       Physical Exam  Wt Readings from Last 1 Encounters:   01/17/23 53.8 kg (118 lb 8 oz)     BP Readings from Last 3 Encounters:   01/17/23 96/60   11/23/22 (!) 139/93   11/16/22 98/60     Pulse Readings from Last 1 Encounters:   01/17/23 95     Body mass index is 23.14 kg/m².    Physical Exam  Vitals reviewed.   Cardiovascular:      Rate and Rhythm: Normal rate. Rhythm irregularly irregular.      Pulses:           Carotid pulses are 2+ on the right side and 2+ on the left side.       Radial pulses are 2+ on the right side and 2+ on the left side.        Dorsalis pedis pulses are 2+ on the right side and 2+ on the left side.      Heart sounds: S1 normal and S2 normal.   Pulmonary:      Effort: Pulmonary effort is normal.      Breath sounds: Normal breath sounds.   Musculoskeletal:      Right lower leg: No edema.      Left lower leg: No edema.   Neurological:      Mental Status: She is alert.           Assessment  1. Atrial fibrillation, unspecified type  Stable, rate controlled, on Eliquis    2. Primary hypertension  Well controlled    3. Hyperlipidemia, unspecified hyperlipidemia type  Stable        Plan and  Discussion  Continue current medication.  Recommend to follow-up with Dr Carey Bronson for eye exam.    Follow Up  6 months      Jorge Bronson MD, F.A.C.C, F.S.C.A.I.        35 minutes were spent in chart review, documentation and review of results, and evaluation, treatment, and counseling of patient on the same day of service.    Disclaimer: This document was created using voice recognition software ([employer] Direct). Although it may be edited, this document may contain errors related to incorrect recognition of the spoken word. Please call the physician if clarification is needed.

## 2023-02-23 ENCOUNTER — OFFICE VISIT (OUTPATIENT)
Dept: PRIMARY CARE CLINIC | Facility: CLINIC | Age: 78
End: 2023-02-23
Payer: MEDICARE

## 2023-02-23 VITALS
DIASTOLIC BLOOD PRESSURE: 82 MMHG | SYSTOLIC BLOOD PRESSURE: 120 MMHG | HEART RATE: 79 BPM | WEIGHT: 118.69 LBS | BODY MASS INDEX: 23.3 KG/M2 | HEIGHT: 60 IN | OXYGEN SATURATION: 98 %

## 2023-02-23 DIAGNOSIS — M25.561 CHRONIC PAIN OF BOTH KNEES: ICD-10-CM

## 2023-02-23 DIAGNOSIS — M25.511 CHRONIC RIGHT SHOULDER PAIN: ICD-10-CM

## 2023-02-23 DIAGNOSIS — G89.29 CHRONIC RIGHT SHOULDER PAIN: ICD-10-CM

## 2023-02-23 DIAGNOSIS — H57.89 EYE IRRITATION: ICD-10-CM

## 2023-02-23 DIAGNOSIS — Z13.820 ENCOUNTER FOR OSTEOPOROSIS SCREENING IN ASYMPTOMATIC POSTMENOPAUSAL PATIENT: ICD-10-CM

## 2023-02-23 DIAGNOSIS — I10 PRIMARY HYPERTENSION: ICD-10-CM

## 2023-02-23 DIAGNOSIS — I48.91 ATRIAL FIBRILLATION, UNSPECIFIED TYPE: ICD-10-CM

## 2023-02-23 DIAGNOSIS — G89.29 CHRONIC PAIN OF BOTH KNEES: ICD-10-CM

## 2023-02-23 DIAGNOSIS — M19.90 ARTHRITIS: Primary | ICD-10-CM

## 2023-02-23 DIAGNOSIS — M25.562 CHRONIC PAIN OF BOTH KNEES: ICD-10-CM

## 2023-02-23 DIAGNOSIS — Z78.0 ENCOUNTER FOR OSTEOPOROSIS SCREENING IN ASYMPTOMATIC POSTMENOPAUSAL PATIENT: ICD-10-CM

## 2023-02-23 PROCEDURE — 3288F PR FALLS RISK ASSESSMENT DOCUMENTED: ICD-10-PCS | Mod: CPTII,S$GLB,, | Performed by: FAMILY MEDICINE

## 2023-02-23 PROCEDURE — 99999 PR PBB SHADOW E&M-EST. PATIENT-LVL IV: CPT | Mod: PBBFAC,,, | Performed by: FAMILY MEDICINE

## 2023-02-23 PROCEDURE — 1101F PR PT FALLS ASSESS DOC 0-1 FALLS W/OUT INJ PAST YR: ICD-10-PCS | Mod: CPTII,S$GLB,, | Performed by: FAMILY MEDICINE

## 2023-02-23 PROCEDURE — 99214 OFFICE O/P EST MOD 30 MIN: CPT | Mod: S$GLB,,, | Performed by: FAMILY MEDICINE

## 2023-02-23 PROCEDURE — 1159F MED LIST DOCD IN RCRD: CPT | Mod: CPTII,S$GLB,, | Performed by: FAMILY MEDICINE

## 2023-02-23 PROCEDURE — 1126F AMNT PAIN NOTED NONE PRSNT: CPT | Mod: CPTII,S$GLB,, | Performed by: FAMILY MEDICINE

## 2023-02-23 PROCEDURE — 1160F RVW MEDS BY RX/DR IN RCRD: CPT | Mod: CPTII,S$GLB,, | Performed by: FAMILY MEDICINE

## 2023-02-23 PROCEDURE — 3074F PR MOST RECENT SYSTOLIC BLOOD PRESSURE < 130 MM HG: ICD-10-PCS | Mod: CPTII,S$GLB,, | Performed by: FAMILY MEDICINE

## 2023-02-23 PROCEDURE — 3079F DIAST BP 80-89 MM HG: CPT | Mod: CPTII,S$GLB,, | Performed by: FAMILY MEDICINE

## 2023-02-23 PROCEDURE — 3074F SYST BP LT 130 MM HG: CPT | Mod: CPTII,S$GLB,, | Performed by: FAMILY MEDICINE

## 2023-02-23 PROCEDURE — 3288F FALL RISK ASSESSMENT DOCD: CPT | Mod: CPTII,S$GLB,, | Performed by: FAMILY MEDICINE

## 2023-02-23 PROCEDURE — 99999 PR PBB SHADOW E&M-EST. PATIENT-LVL IV: ICD-10-PCS | Mod: PBBFAC,,, | Performed by: FAMILY MEDICINE

## 2023-02-23 PROCEDURE — 1159F PR MEDICATION LIST DOCUMENTED IN MEDICAL RECORD: ICD-10-PCS | Mod: CPTII,S$GLB,, | Performed by: FAMILY MEDICINE

## 2023-02-23 PROCEDURE — 1160F PR REVIEW ALL MEDS BY PRESCRIBER/CLIN PHARMACIST DOCUMENTED: ICD-10-PCS | Mod: CPTII,S$GLB,, | Performed by: FAMILY MEDICINE

## 2023-02-23 PROCEDURE — 3079F PR MOST RECENT DIASTOLIC BLOOD PRESSURE 80-89 MM HG: ICD-10-PCS | Mod: CPTII,S$GLB,, | Performed by: FAMILY MEDICINE

## 2023-02-23 PROCEDURE — 1101F PT FALLS ASSESS-DOCD LE1/YR: CPT | Mod: CPTII,S$GLB,, | Performed by: FAMILY MEDICINE

## 2023-02-23 PROCEDURE — 1126F PR PAIN SEVERITY QUANTIFIED, NO PAIN PRESENT: ICD-10-PCS | Mod: CPTII,S$GLB,, | Performed by: FAMILY MEDICINE

## 2023-02-23 PROCEDURE — 99214 PR OFFICE/OUTPT VISIT, EST, LEVL IV, 30-39 MIN: ICD-10-PCS | Mod: S$GLB,,, | Performed by: FAMILY MEDICINE

## 2023-02-23 RX ORDER — ACETAMINOPHEN 500 MG
500 TABLET ORAL EVERY 6 HOURS PRN
Qty: 60 TABLET | Refills: 0 | Status: SHIPPED | OUTPATIENT
Start: 2023-02-23

## 2023-02-23 RX ORDER — AZELASTINE HYDROCHLORIDE 0.5 MG/ML
1 SOLUTION/ DROPS OPHTHALMIC 2 TIMES DAILY
Qty: 6 ML | Refills: 3 | Status: SHIPPED | OUTPATIENT
Start: 2023-02-23 | End: 2023-10-26 | Stop reason: SDUPTHER

## 2023-02-23 NOTE — PROGRESS NOTES
Clinic Note  2/23/2023      Subjective:       Patient ID:  Louis is a 77 y.o. female being seen for an established visit.    Chief Complaint: Annual Exam    Annual exam-patient with a history of hypertension, hyperlipidemia, atrial fibrillation, H pylori, osteoarthritis here for annual exam, here with .    Eye irritation-patient reports having occasional eye irritation, missed her ophthalmology appointment on purpose.  Does not want to see an eye doctor and then continuing have to see the eye doctor every year.  Does have glasses which patient intermittently wears.    Osteopenia-takes a multivitamin daily     Atrial fibrillation-on Eliquis and metoprolol, being followed by     Arthritis-patient reports have chronic right shoulder pain and bilateral knee pain, would be willing to do physical therapy         Review of Systems   Constitutional:  Negative for chills, fever, malaise/fatigue and weight loss.   HENT:  Negative for congestion, sinus pain and sore throat.    Respiratory:  Negative for cough, shortness of breath and wheezing.    Cardiovascular:  Negative for chest pain and palpitations.   Gastrointestinal:  Negative for constipation, diarrhea, nausea and vomiting.   Genitourinary:  Negative for dysuria, frequency and urgency.   Musculoskeletal:  Negative for myalgias.   Skin:  Negative for rash.   Neurological:  Negative for headaches.     Medication List with Changes/Refills   New Medications    ACETAMINOPHEN (TYLENOL) 500 MG TABLET    Take 1 tablet (500 mg total) by mouth every 6 (six) hours as needed for Pain. Se can, dianne 6 andreang ushahram 1 gemini godwinu [facility]   Current Medications    APIXABAN (ELIQUIS) 5 MG TAB    Take 1 tablet (5 mg total) by mouth 2 (two) times daily.    CA COMB NO.1/VIT D3/B6/FA/B12 (VITAMIN D3, CALCIUM CIT-PHOS, ORAL)    Take by mouth.    CALCIUM CARBONATE (OS-ALAINA) 600 MG CALCIUM (1,500 MG) TAB    Take 600 mg by mouth once.    DICLOFENAC SODIUM (VOLTAREN) 1 % GEL    Apply 2 g  topically 4 (four) times daily.    DOCUSATE SODIUM (COLACE) 100 MG CAPSULE    Take 100 mg by mouth 2 (two) times daily.    ESOMEPRAZOLE (NEXIUM) 40 MG CAPSULE    Take 1 capsule (40 mg total) by mouth before breakfast. uo melissa tu    LEVOCETIRIZINE (XYZAL) 5 MG TABLET    Take 1 tablet (5 mg total) by mouth every evening. Allergy / di neel    METOPROLOL SUCCINATE (TOPROL-XL) 25 MG 24 HR TABLET    Take 1 tablet (25 mg total) by mouth once daily. [facility] fady (lizzy vega)   Changed and/or Refilled Medications    Modified Medication Previous Medication    AZELASTINE (OPTIVAR) 0.05 % OPHTHALMIC SOLUTION azelastine (OPTIVAR) 0.05 % ophthalmic solution       Place 1 drop into both eyes 2 (two) times daily.    Place 1 drop into both eyes 2 (two) times daily.   Discontinued Medications    OLOPATADINE (PAZEO) 0.7 % DROP    Place 1 drop into both eyes daily as needed (ngua mat).       Patient Active Problem List   Diagnosis    Vaginal atrophy    Hyperlipidemia    Hypertension    Atrial fibrillation           Objective:      /82 (BP Location: Left arm, Patient Position: Sitting, BP Method: X-Large (Automatic))   Pulse 79   Ht 5' (1.524 m)   Wt 53.8 kg (118 lb 11.5 oz)   LMP  (LMP Unknown)   SpO2 98%   BMI 23.19 kg/m²   Estimated body mass index is 23.19 kg/m² as calculated from the following:    Height as of this encounter: 5' (1.524 m).    Weight as of this encounter: 53.8 kg (118 lb 11.5 oz).  Physical Exam  Vitals reviewed.   Constitutional:       General: She is not in acute distress.     Appearance: She is not diaphoretic.   HENT:      Head: Normocephalic and atraumatic.   Eyes:      Conjunctiva/sclera: Conjunctivae normal.   Cardiovascular:      Rate and Rhythm: Normal rate. Rhythm irregular.      Heart sounds: Normal heart sounds.   Pulmonary:      Effort: Pulmonary effort is normal. No respiratory distress.      Breath sounds: Normal breath sounds. No wheezing.   Abdominal:      General: Bowel sounds are normal.       Palpations: Abdomen is soft.   Musculoskeletal:         General: Normal range of motion.      Cervical back: Normal range of motion.   Skin:     General: Skin is warm and dry.      Findings: No erythema or rash.   Neurological:      Mental Status: She is alert and oriented to person, place, and time.   Psychiatric:         Mood and Affect: Mood and affect normal.         Behavior: Behavior normal.         Thought Content: Thought content normal.         Judgment: Judgment normal.         Assessment and Plan:     1. Arthritis  - acetaminophen (TYLENOL) 500 MG tablet; Take 1 tablet (500 mg total) by mouth every 6 (six) hours as needed for Pain. Se can, dianne 6 tieng uong 1 gemini godwinu [facility] [place]  Dispense: 60 tablet; Refill: 0    2. Chronic pain of both knees  - for to physical therapy for her knees    3. Chronic right shoulder pain  - refer to physical therapy for chronic right shoulder pain, previous x-ray showing arthritis  - Ambulatory referral/consult to Physical/Occupational Therapy; Future    4. Eye irritation  - encourage patient to follow-up with ophthalmology if still having eye irritation  - azelastine (OPTIVAR) 0.05 % ophthalmic solution; Place 1 drop into both eyes 2 (two) times daily.  Dispense: 6 mL; Refill: 3    5. Encounter for osteoporosis screening in asymptomatic postmenopausal patient  - DXA Bone Density Axial Skeleton 1 or more sites; Future    6. Primary hypertension  - blood pressure controlled on metoprolol    7. Atrial fibrillation, unspecified type  - being followed by Cardiology, stable on Eliquis and metoprolol        Follow Up:   Follow up in about 3 months (around 5/23/2023) for follow-up.    Other Orders Placed This Visit:  Orders Placed This Encounter   Procedures    DXA Bone Density Axial Skeleton 1 or more sites    Ambulatory referral/consult to Physical/Occupational Therapy         Nhan Bronson MD        This note is dictated on M*Modal word recognition program.  There are word  recognition mistakes that are occasionally missed on review.

## 2023-02-24 ENCOUNTER — CLINICAL SUPPORT (OUTPATIENT)
Dept: REHABILITATION | Facility: HOSPITAL | Age: 78
End: 2023-02-24
Payer: MEDICARE

## 2023-02-24 DIAGNOSIS — M25.611 DECREASED RIGHT SHOULDER RANGE OF MOTION: ICD-10-CM

## 2023-02-24 DIAGNOSIS — G89.29 CHRONIC RIGHT SHOULDER PAIN: ICD-10-CM

## 2023-02-24 DIAGNOSIS — R29.3 POSTURE IMBALANCE: ICD-10-CM

## 2023-02-24 DIAGNOSIS — M25.511 CHRONIC RIGHT SHOULDER PAIN: ICD-10-CM

## 2023-02-24 PROCEDURE — 97110 THERAPEUTIC EXERCISES: CPT | Mod: PN

## 2023-02-24 PROCEDURE — 97162 PT EVAL MOD COMPLEX 30 MIN: CPT | Mod: PN

## 2023-02-24 NOTE — PROGRESS NOTES
[facility]  Physical Therapy Initial Evaluation - Shoulder    Name: Louis Bronson  Clinic Number: [id number]     ID  [id number]    Therapy Diagnosis:   Encounter Diagnosis   Name Primary?    Chronic right shoulder pain      Physician: Nhan Bronson MD    Physician Orders: PT Eval and Treat   Medical Diagnosis from Referral:   M25.511 (ICD-10-CM) - Pain in right shoulder   G89.29 (ICD-10-CM) - Other chronic pain     Evaluation Date: 2/24/2023  Plan of Care Expiration: 5/19/23 or 24th Visit  Authorization Period Expiration: 3/24/23  Visit # / Visits authorized: 1/ 1  Remaining Visits Scheduled - 00      Please see Plan of Care notation section to view Louis Bronson Initial Evaluation.       Otto Dickey, PT,DPT

## 2023-02-28 PROBLEM — M25.511 CHRONIC RIGHT SHOULDER PAIN: Status: ACTIVE | Noted: 2023-02-28

## 2023-02-28 PROBLEM — G89.29 CHRONIC RIGHT SHOULDER PAIN: Status: ACTIVE | Noted: 2023-02-28

## 2023-02-28 PROBLEM — M25.611 DECREASED RIGHT SHOULDER RANGE OF MOTION: Status: ACTIVE | Noted: 2023-02-28

## 2023-02-28 PROBLEM — R29.3 POSTURE IMBALANCE: Status: ACTIVE | Noted: 2023-02-28

## 2023-02-28 NOTE — PLAN OF CARE
" [facility]  Physical Therapy Initial Evaluation - Shoulder    Name: Louis Bronson  Clinic Number: [id number]     ID  [id number]    Therapy Diagnosis:   Encounter Diagnosis   Name Primary?    Chronic right shoulder pain      Physician: Nhan Bronson MD    Physician Orders: PT Eval and Treat   Medical Diagnosis from Referral:   M25.511 (ICD-10-CM) - Pain in right shoulder   G89.29 (ICD-10-CM) - Other chronic pain     Evaluation Date: 2/24/2023  Plan of Care Expiration: 5/19/23 or 24th Visit  Authorization Period Expiration: 3/24/23  Visit # / Visits authorized: 1/ 1  Remaining Visits Scheduled - 00    Eval Visit FOTO-  (Date/Score/Turn Green)   5th Visit FOTO   -  (Date/Score/Turn Green)   10th Visit FOTO  -  (Date/Score/Turn Green)   D/C FOTO          -  (Date/Score/Turn Green)     Time In: 1145  Time Out: 1230  Total Billable Time: 45 minutes    Precautions: Standard    Subjective     History of current condition - Louis is a 77 y.o. year old female who presents to the [facility] PT clinic  with complaint of right shoulder pain. Pain begins at the elbow and radiates at the shoulder. Patient reports that the pain has occurred for a long time. Patient reports that she has pain with garden work.  Pt report onset of the symptoms occurred  "years" prior to evaluation. Precipitating event:Insidious Onset . Current symptoms include: constant pain that hurts more when "I'm putting it down". Aggravating factors: return from flexion .  Treatment to date: no. Patients presently rates pain 6/10 on pain scale.    Mechanism of Injury: unknown   Next MD Visit: none     Medical History:   Past Medical History:   Diagnosis Date    H. pylori infection     Hyperlipidemia     Hypertension     Unspecified osteoarthritis, unspecified site        Surgical History:   Louis Bronson  has a past surgical history that includes Upper gastrointestinal endoscopy (11/23/2022) and Esophagogastroduodenoscopy (N/A, " "11/23/2022).    Medications:   Louis has a current medication list which includes the following prescription(s): acetaminophen, apixaban, azelastine, ca comb no.1/vit d3/b6/fa/b12, calcium carbonate, diclofenac sodium, docusate sodium, esomeprazole, levocetirizine, and metoprolol succinate.    Allergies:   Review of patient's allergies indicates:  No Known Allergies     Imaging:See imaging tab     Prior Therapy: No prior therapy received for current condition   Social History: Louis lives in a single story home with 0 steps to enter and  lives with their spouse  Occupation: none  Assistive Devices Owned: none   Hobbies/Exercise: gardening   Hand Dominance: right  Prior Level of Function: Modified Independent  Current Level of Function: Modified Independent secondary to pain    Pain:  Current 6/10, worst 6/10 (none increases pain), best 2/10 (unknown reduces pain)  Location: right shoulder   Description: Dull  Aggravating Factors: Lifting  Easing Factors: rest    Pts goals: "I don't know"     Objective     Posture: Poor   Palpation: mild generalized muscle tenderness  Sensation: intact to light touch  DTRs: 3+    Range of Motion/Strength:     Shoulder Left Right Pain/Dysfunction with Movement   AROM      Flexion (180)  150°   140°     Extension (60)  25°    25°     Abduction (180)  140°   110°     Adduction (50)  50°   50°     Internal rotation (70)  55°   20°     ER (70) [at 0° shoulder abduction]  60°   15°           Left UE  5/5 4+/5 4/5 4-/5 3+/5 3/5 3-/5 2+/5 2/5 2-/5 1/5 0 NT   Shoulder  Flexion     x                Shoulder Extension     x                Shoulder Abduction     x                Shoulder Adduction     x                Shoulder  IR     x                Shoulder  ER     x                Elbow Extension      x                Elbow Flexion      x                Rhomboids      x                Upper Trap     x                Mid Traps     x           Lower Traps     x           Serratus Anterior     x " "            Right UE 5/5 4+/5 4/5 4-/5 3+/5 3/5 3-/5 2+/5 2/5 2-/5 1/5 0 NT   Shoulder  Flexion       x         Shoulder Extension       x         Shoulder Abduction       x         Shoulder Adduction       x         Shoulder  IR       x         Shoulder  ER       x         Elbow Extension        x         Elbow Flexion        x         Rhomboids        x         Upper Trap       x         Mid Traps       x         Lower Traps       x         Serratus Anterior       x             Special Tests Left Right Comments   Neer Impingement  (RC impingement) Positive  positive    Patterson & Jai (RC impingement -sup/minor/infra) positive positive    Empty Can          (RC - supraspinatus) negative positive              Other:       CMS Impairment/Limitation/Restriction for FOTO Shoulder Survey    Therapist reviewed FOTO scores for Louis Bronson on 2/24/2023.   FOTO documents entered into [employer] - see Media section.    Limitation Score: See Media Section   Category: Mobility         TREATMENT   Treatment Time In: 1215  Treatment Time Out: 1230  Total Treatment time separate from Evaluation: 15 minutes    Louis received therapeutic exercises to develop strength, endurance, ROM, and flexibility for 15 minutes including:  Supine flexion - 30 repititions    Wrist flexion flexion - 30 repititions    Shrugs - 30 repititions    Scapular retractions - 30 repititions    passive range of motion flexion in standing - X2'   Cervical rotation, lateral flexion and flex/ext - 30 repititions    Table slides flexion, abduction , scaption - 30 repititions    Supination - 30 repititions    Finger ball squeeze - 30 repititions    Finger iso-into extension - 10X10"      Home Exercises and Patient Education Provided    Education provided:   Patient was provided educational information regarding: role of Physical Therapy, short and long term goals, patient/therapist expectations, scheduling, and attendance policy.    Written Home Exercises Provided: " yes  Exercises were reviewed and Louis was able to demonstrate them prior to the end of the session.  Louis demonstrated fair  understanding of the education provided.     See EMR under: Patient Instructions for exercises provided 2/24/2023.    Assessment   Louis is a 77 y.o. female referred to outpatient Physical Therapy with a medical diagnosis of Pain in right shoulder. Pt presents with displays of weakness, impaired functional mobility, decreased upper extremity function, pain, and decreased ROM.   Patient currently presents to therapy with limited right shoulder range of motion and strength with objective testing. Patient denies a specific injury to the shoulder however reports that the lack of strength and range of motion is hindering her from gardening. Patient will  benefit from skilled therapy to address current deficits.     Pt prognosis is Fair.   Pt will benefit from skilled outpatient Physical Therapy to address the deficits stated above and in the chart below, provide pt/family education, and to maximize pt's level of independence.     Plan of care discussed with patient: Yes  Pt's spiritual, cultural and educational needs considered and patient is agreeable to the plan of care and goals as stated below:     Anticipated Barriers for therapy: None Identified during initial evaluation     Medical Necessity is demonstrated by the following  History  Co-morbidities and personal factors that may impact the plan of care Co-morbidities:       Past Medical History:   Diagnosis Date    H. pylori infection     Hyperlipidemia     Hypertension     Unspecified osteoarthritis, unspecified site        Personal Factors:   no deficits     moderate   Examination  Body Structures and Functions, activity limitations and participation restrictions that may impact the plan of care Body Regions:   upper extremities    Body Systems:    ROM  strength    Participation Restrictions:   none    Activity limitations:   Learning and  applying knowledge  no deficits    General Tasks and Commands  undertaking multiple tasks    Communication  communicating with/receiving spoken language    Mobility  lifting and carrying objects    Self care  no deficits    Domestic Life  doing house work (cleaning house, washing dishes, laundry)    Interactions/Relationships  no deficits    Life Areas  no deficits    Community and Social Life  no deficits         moderate   Clinical Presentation stable and uncomplicated moderate   Decision Making/ Complexity Score: moderate     Goals:  Short Term Goals: 4 weeks      Goal   Status   Pt. to report decreased right shoulder pain </= 6/10 at worst to increase tolerance for performing overhead reaching     Pt. to demonstrate proper cervical and scapula retraction requiring minimum verbal cues from PT to perform    Pt. to demonstrate an increase in right shoulder elevation AROM to >120 deg. to promote greater ease with lifting tasks.     Pt. to demonstrate an increase in right shoulder internal and external AROM to +10 deg. to promote greater ease self care skills     Pt to tolerate HEP to improve ROM and independence with ADL's         Long Term Goals: 8 weeks    Goal Status    Pt. to report decreased right shoulder pain </= 3/10 at worst to increase tolerance for performing overhead reaching     Pt. to demonstrate proper cervical and scapula retraction requiring minimum verbal cues from PT to perform    Pt. to demonstrate an increase in right shoulder elevation AROM to >150 deg. to promote greater ease with lifting tasks.     Pt. to demonstrate an increase in right shoulder internal and external AROM to +15 deg. to promote greater ease self care skills     Pt. to demonstrate increased MMT for right shoulder flexion to 4/5 to improve ADL tolerance      Pt. to demonstrate increased MMT for mid-trap/lower trap strength to 4/5 to improve scapula stability during ADL and home related chores.     Pt. to demonstrate increased  MMT to serratus anterior to 4/5 to improve scapula stability during repetitive UE tasks     Pt to tolerate HEP to improve ROM and independence with ADL's         Plan     Plan of care Certification: 2/24/2023 to 5/19/2023 (12).    Outpatient Physical Therapy 1-2 times weekly for 12 weeks to include the following interventions: Manual Therapy, Neuromuscular Re-ed, Patient Education, Therapeutic Activities, Therapeutic Exercise, Ultrasound, and Integrative Dry Needling .     Otto Dickey, PT,DPT

## 2023-03-17 ENCOUNTER — CLINICAL SUPPORT (OUTPATIENT)
Dept: REHABILITATION | Facility: HOSPITAL | Age: 78
End: 2023-03-17
Payer: MEDICARE

## 2023-03-17 DIAGNOSIS — M25.511 CHRONIC RIGHT SHOULDER PAIN: ICD-10-CM

## 2023-03-17 DIAGNOSIS — G89.29 CHRONIC RIGHT SHOULDER PAIN: ICD-10-CM

## 2023-03-17 DIAGNOSIS — R29.3 POSTURE IMBALANCE: ICD-10-CM

## 2023-03-17 DIAGNOSIS — M25.611 DECREASED RIGHT SHOULDER RANGE OF MOTION: Primary | ICD-10-CM

## 2023-03-17 PROCEDURE — 97110 THERAPEUTIC EXERCISES: CPT | Mod: PN

## 2023-03-17 PROCEDURE — 97112 NEUROMUSCULAR REEDUCATION: CPT | Mod: PN

## 2023-03-17 NOTE — PROGRESS NOTES
Physical Therapy Daily Treatment Note     Name: Louis Bronson  Clinic Number: [id number]    Therapy Diagnosis:   Encounter Diagnoses   Name Primary?    Decreased right shoulder range of motion Yes    Chronic right shoulder pain     Posture imbalance      Physician: Nhan Bronson MD    Visit Date: 3/17/2023    Physician Orders: PT Eval and Treat   Medical Diagnosis from Referral:   M25.511 (ICD-10-CM) - Pain in right shoulder   G89.29 (ICD-10-CM) - Other chronic pain     Evaluation Date: 2023  Plan of Care Expiration: 23 or  Visit  Authorization Period Expiration: 3/24/23  Progress Note: 3/24/2023  Visit # / Visits authorized:   Remaining Visits Scheduled - 04  PTA Consecutive Visits - 0/6    Eval Visit FOTO-  (Date/Score/Turn Green)   5th Visit FOTO   -  (Date/Score/Turn Green)   10th Visit FOTO  -  (Date/Score/Turn Green)   D/C FOTO          -  (Date/Score/Turn Green)      Time In: 12:15  Time Out: 13:00  Billable Time: 45 minutes  Non-Billable Time: 00 minutes    Precautions: Standard and HTN  Insurance: Payor: [employer] / Plan: [employer] / Product Type: Medicare Advantage /     Subjective     *Use * (Portuguese)     Pt reports: feeling the same as the initial evaluation.  She is compliant with home exercise program.  Response to previous treatment: 1st treatment         Pre-Treatment Pain Ratin/10  Post-Treatment Pain Ratin/10  Location: right shoulder      Objective     Louis received therapeutic exercises to develop strength, endurance, ROM, flexibility, and posture for 25 minutes including:    Warm-up      Supine    Dowel Flexion/External Rotation 2x10  Resisted Shoulder Flexion/Abduction 2x10, Red Theraband     Seated    Shoulder External Rotation active range of motion x20     Standing    Dowel Abduction/Internal Rotation 2x10  Horizontal Abduction 2x10, Red Theraband       *Bold exercise performed       Louis received the following  manual therapy techniques: Joint mobilizations, Manual traction, Myofacial release, and Soft tissue Mobilization were applied to the: Right shoulder for 00 minutes, including:        Louis participated in neuromuscular re-education activities to improve: Balance, Coordination, Kinesthetic, Proprioception, Posture, and Neuromuscular Control for 20 minutes. The following activities were included:  Shoulder Isometric Set 1x10 with a 3' sec hold (unable to perform full set in all planes due to language barrier. Ms. Bronson did not want )   Rhythm Stabilization in Supine 3x15, No Resistance (Clockwise/Counter-Clockwise/Up-Down/Sideways)  Scapular Squeezes x30      Louis participated in dynamic functional therapeutic activities to improve functional performance for 00  minutes, including:        Home Exercises Provided and Patient Education Provided     Education provided:   - Continue with HEP    Written Home Exercises Provided: Patient instructed to cont prior HEP.  Exercises were reviewed and Louis was able to demonstrate them prior to the end of the session.  Louis demonstrated poor understanding of the education provided.     See EMR under Patient Instructions for exercises provided prior visit.    Assessment     Patient completed the exercise program with no modifications of pain. Ms. Bronson states that it felt heavy at the end. She was wearing an lidocaine patch due to pain. She had been in the garden and stated that she had some pain. Exercises were well tolerated with moderate level of visual and tactile cues needed to perform exercises correctly. No modifications were needed. Patient would continue to benefit from skilled Physical Therapy to decrease pain to the Right shoulder.     Louis Is progressing well towards Her goals.     Pt prognosis is Fair.     Pt will continue to benefit from skilled outpatient physical therapy to address the deficits listed in the problem list box on initial evaluation, provide  pt/family education and to maximize pt's level of independence in the home and community environment.     Pt's spiritual, cultural and educational needs considered and pt agreeable to plan of care and goals.    Anticipated barriers to physical therapy: None.     Goals:  Short Term Goals: 4 weeks        Goal   Status   Pt. to report decreased right shoulder pain </= 6/10 at worst to increase tolerance for performing overhead reaching  Progressing 3/17/2023     Pt. to demonstrate proper cervical and scapula retraction requiring minimum verbal cues from PT to perform Progressing 3/17/2023     Pt. to demonstrate an increase in right shoulder elevation AROM to >120 deg. to promote greater ease with lifting tasks.  Progressing 3/17/2023    Pt. to demonstrate an increase in right shoulder internal and external AROM to +10 deg. to promote greater ease self care skills  Progressing 3/17/2023    Pt to tolerate HEP to improve ROM and independence with ADL's  Progressing 3/17/2023          Long Term Goals: 8 weeks     Goal Status    Pt. to report decreased right shoulder pain </= 3/10 at worst to increase tolerance for performing overhead reaching  Progressing 3/17/2023     Pt. to demonstrate proper cervical and scapula retraction requiring minimum verbal cues from PT to perform Progressing 3/17/2023    Pt. to demonstrate an increase in right shoulder elevation AROM to >150 deg. to promote greater ease with lifting tasks.  Progressing 3/17/2023    Pt. to demonstrate an increase in right shoulder internal and external AROM to +15 deg. to promote greater ease self care skills  Progressing 3/17/2023     Pt. to demonstrate increased MMT for right shoulder flexion to 4/5 to improve ADL tolerance   Progressing 3/17/2023    Pt. to demonstrate increased MMT for mid-trap/lower trap strength to 4/5 to improve scapula stability during ADL and home related chores.  Progressing 3/17/2023    Pt. to demonstrate increased MMT to serratus  anterior to 4/5 to improve scapula stability during repetitive UE tasks  Progressing 3/17/2023    Pt to tolerate HEP to improve ROM and independence with ADL's  Progressing 3/17/2023        Plan     Progress duration, workload, and resistance levels of the Therapeutic Activities and Exercises if the patient does not exhibit any pain, swelling, or other symptoms. Progress instruction in-home exercise progression and modification, including symptom management.    Darell Cabrera, PT ,DPT  3/17/2023

## 2023-03-31 ENCOUNTER — CLINICAL SUPPORT (OUTPATIENT)
Dept: REHABILITATION | Facility: HOSPITAL | Age: 78
End: 2023-03-31
Payer: MEDICARE

## 2023-03-31 DIAGNOSIS — M25.511 CHRONIC RIGHT SHOULDER PAIN: ICD-10-CM

## 2023-03-31 DIAGNOSIS — R29.3 POSTURE IMBALANCE: ICD-10-CM

## 2023-03-31 DIAGNOSIS — G89.29 CHRONIC RIGHT SHOULDER PAIN: ICD-10-CM

## 2023-03-31 DIAGNOSIS — M25.611 DECREASED RIGHT SHOULDER RANGE OF MOTION: Primary | ICD-10-CM

## 2023-03-31 PROCEDURE — 97112 NEUROMUSCULAR REEDUCATION: CPT | Mod: PN

## 2023-03-31 PROCEDURE — 97110 THERAPEUTIC EXERCISES: CPT | Mod: PN

## 2023-03-31 NOTE — PROGRESS NOTES
Physical Therapy Daily Treatment Note     Name: Louis Bronson  Clinic Number: [id number]    Therapy Diagnosis:   Encounter Diagnoses   Name Primary?    Decreased right shoulder range of motion Yes    Chronic right shoulder pain     Posture imbalance        Physician: Nhan Bronson MD    Visit Date: 3/31/2023    Physician Orders: PT Eval and Treat   Medical Diagnosis from Referral:   M25.511 (ICD-10-CM) - Pain in right shoulder   G89.29 (ICD-10-CM) - Other chronic pain     Evaluation Date: 2/24/2023  Plan of Care Expiration: 5/19/23 or 24th Visit  Authorization Period Expiration: 4/24/2023  Progress Note: 5/1/2023  Visit # / Visits authorized: 2/20  Remaining Visits Scheduled - 02 PTA Consecutive Visits - 0/6   ID: [id number]    Time In: 11:45  Time Out: 12:30  Billable Time: 45 minutes  Non-Billable Time: 00 minutes    Precautions: Standard and HTN  Insurance: Payor: [employer] / Plan: [employer] / Product Type: Medicare Advantage /     Subjective     Pt. Reports: some improvement in the shoulders. However, she's still very with chores and working on the garden.     She is compliant with home exercise program.  Response to previous treatment: 1st treatment         Pre-Treatment Pain Rating: 3/10  Post-Treatment Pain Rating: 3/10  Location: right shoulder      Objective     Range of Motion/Strength:      Shoulder Left Right Pain/Dysfunction with Movement   AROM         Flexion (180)  150°   140°      Extension (60)  25°    25°      Abduction (180)  140°   110°      Adduction (50)  50°   50°      Internal rotation (70)  55°   20°      ER (70) [at 0° shoulder abduction]  60°   15°            Left UE  5/5 4+/5 4/5 4-/5 3+/5 3/5 3-/5 2+/5 2/5 2-/5 1/5 0 NT   Shoulder  Flexion         x                   Shoulder Extension         x                   Shoulder Abduction         x                   Shoulder Adduction         x                   Shoulder  IR         x                    Shoulder  ER         x                   Elbow Extension          x                   Elbow Flexion          x                   Rhomboids          x                   Upper Trap         x                   Mid Traps         x                   Lower Traps         x                   Serratus Anterior         x                      Right UE 5/5 4+/5 4/5 4-/5 3+/5 3/5 3-/5 2+/5 2/5 2-/5 1/5 0 NT   Shoulder  Flexion             x               Shoulder Extension             x               Shoulder Abduction             x               Shoulder Adduction             x               Shoulder  IR             x               Shoulder  ER             x               Elbow Extension              x               Elbow Flexion              x               Rhomboids              x               Upper Trap             x               Mid Traps             x               Lower Traps             x               Serratus Anterior             x                   Louis received therapeutic exercises to develop strength, endurance, ROM, flexibility, and posture for 25 minutes including:    Warm-up      Supine    Dowel Flexion/External Rotation 2x10  Resisted Shoulder Flexion/Abduction 2x10, Red Theraband     Seated    Shoulder External Rotation, x20, Yellow Theraband    Standing    Dowel Abduction/Internal Rotation 2x10  Horizontal Abduction 2x10, Red Theraband       *Bold exercise performed       Louis received the following manual therapy techniques: Joint mobilizations, Manual traction, Myofacial release, and Soft tissue Mobilization were applied to the: Right shoulder for 00 minutes, including:        Louis participated in neuromuscular re-education activities to improve: Balance, Coordination, Kinesthetic, Proprioception, Posture, and Neuromuscular Control for 20 minutes. The following activities were included:  Shoulder Isometric Set 1x10 with a 3' sec hold (unable to perform full set in all planes due to language  barrier. Ms. Bronson did not want )   Rhythm Stabilization with Ball 3x15, No Resistance (Clockwise/Counter-Clockwise/Up-Down/Sideways)  Scapular Squeezes x30    Pull Downs x20, Yellow Theraband   Rows x20, Yellow Theraband     Louis participated in dynamic functional therapeutic activities to improve functional performance for 00  minutes, including:        Home Exercises Provided and Patient Education Provided     Education provided:   - Continue with HEP    Written Home Exercises Provided: Patient instructed to cont prior HEP.  Exercises were reviewed and Louis was able to demonstrate them prior to the end of the session.  Louis demonstrated poor understanding of the education provided.     See EMR under Patient Instructions for exercises provided prior visit.    Assessment     Pt. has been to [facility] for 2 visits due to Right shoulder pain. Due to limited attendance, no significant changes have been noted at this time. New exercises were added to increase functional strength.  helped with exercise program. At this time, patient would continue to benefit from skilled Physical Therapy to decrease shoulder pain.     Louis Is progressing well towards Her goals.     Pt prognosis is Fair.     Pt will continue to benefit from skilled outpatient physical therapy to address the deficits listed in the problem list box on initial evaluation, provide pt/family education and to maximize pt's level of independence in the home and community environment.     Pt's spiritual, cultural and educational needs considered and pt agreeable to plan of care and goals.    Anticipated barriers to physical therapy: None.     Goals:  Short Term Goals: 4 weeks        Goal   Status   Pt. to report decreased right shoulder pain </= 6/10 at worst to increase tolerance for performing overhead reaching  Progressing 3/31/2023     Pt. to demonstrate proper cervical and scapula retraction requiring minimum  verbal cues from PT to perform Progressing 3/31/2023     Pt. to demonstrate an increase in right shoulder elevation AROM to >120 deg. to promote greater ease with lifting tasks.  Progressing 3/31/2023    Pt. to demonstrate an increase in right shoulder internal and external AROM to +10 deg. to promote greater ease self care skills  Progressing 3/31/2023    Pt to tolerate HEP to improve ROM and independence with ADL's  Progressing 3/31/2023          Long Term Goals: 8 weeks     Goal Status    Pt. to report decreased right shoulder pain </= 3/10 at worst to increase tolerance for performing overhead reaching  Progressing 3/31/2023     Pt. to demonstrate proper cervical and scapula retraction requiring minimum verbal cues from PT to perform Progressing 3/31/2023    Pt. to demonstrate an increase in right shoulder elevation AROM to >150 deg. to promote greater ease with lifting tasks.  Progressing 3/31/2023    Pt. to demonstrate an increase in right shoulder internal and external AROM to +15 deg. to promote greater ease self care skills  Progressing 3/31/2023     Pt. to demonstrate increased MMT for right shoulder flexion to 4/5 to improve ADL tolerance   Progressing 3/31/2023    Pt. to demonstrate increased MMT for mid-trap/lower trap strength to 4/5 to improve scapula stability during ADL and home related chores.  Progressing 3/31/2023    Pt. to demonstrate increased MMT to serratus anterior to 4/5 to improve scapula stability during repetitive UE tasks  Progressing 3/31/2023    Pt to tolerate HEP to improve ROM and independence with ADL's  Progressing 3/31/2023        Plan     Progress duration, workload, and resistance levels of the Therapeutic Activities and Exercises if the patient does not exhibit any pain, swelling, or other symptoms. Progress instruction in-home exercise progression and modification, including symptom management.    Darell Cabrera, PT ,DPT  3/31/2023

## 2023-04-11 NOTE — PROGRESS NOTES
Physical Therapy Daily Treatment Note     Name: Louis Bronson  Clinic Number: [id number]    Therapy Diagnosis:   Encounter Diagnoses   Name Primary?    Decreased right shoulder range of motion Yes    Chronic right shoulder pain     Posture imbalance        Physician: Nhan Bronson MD    Visit Date: 4/12/2023    Physician Orders: PT Eval and Treat   Medical Diagnosis from Referral:   M25.511 (ICD-10-CM) - Pain in right shoulder   G89.29 (ICD-10-CM) - Other chronic pain     Evaluation Date: 2/24/2023  Plan of Care Expiration: 5/19/23 or 24th Visit  Authorization Period Expiration: 4/24/2023  Progress Note: 5/1/2023  Visit # / Visits authorized: 3/20  Remaining Visits Scheduled - 02 PTA Consecutive Visits - 0/6   ID: [id number]    Time In: 4:00 PM  Time Out: 4:45 PM  Billable Time: 35 minutes (1TE, 1NM)  Non-Billable Time: 00 minutes    Precautions: Standard and HTN  Insurance: Payor: [employer] / Plan: [employer] / Product Type: Medicare Advantage /     Subjective     Pt. Reports: Continued pain in R shoulder and R knee. However, pain in R knee is more apparent with gardening and standing up from low chair.    She is compliant with home exercise program.  Response to previous treatment: 1st treatment         Pre-Treatment Pain Rating: 3/10 R shoulder; 2/10 R knee  Location: right shoulder and R knee    Objective     Louis received therapeutic exercises to develop strength, endurance, ROM, flexibility, and posture for 25 minutes including:    Warm-up      Supine    Dowel Flexion/External Rotation 2x10  Resisted Shoulder Flexion/Abduction 2x10, Red Theraband     Seated    Shoulder External Rotation, x20, Yellow Theraband    Standing    Dowel Abduction/Internal Rotation 2x10  Horizontal Abduction 2x10, Red Theraband       *Bold exercise performed       Louis received the following manual therapy techniques: Joint mobilizations, Manual traction, Myofacial release, and Soft  tissue Mobilization were applied to the: Right shoulder for 00 minutes, including:        Louis participated in neuromuscular re-education activities to improve: Balance, Coordination, Kinesthetic, Proprioception, Posture, and Neuromuscular Control for 10 minutes. The following activities were included:  Shoulder Isometric Set 1x10 with a 3' sec hold (unable to perform full set in all planes due to language barrier. Ms. Bronson did not want )   Rhythm Stabilization with Ball 3x15, No Resistance (Clockwise/Counter-Clockwise/Up-Down/Sideways)  Scapular Squeezes x30    Isometrics shoulder flexion/extension/ER/Abduction 20x each direction  Pull Downs x20, Yellow Theraband   Rows x20, Yellow Theraband     Louis participated in dynamic functional therapeutic activities to improve functional performance for 00  minutes, including:        Home Exercises Provided and Patient Education Provided     Education provided:   - Continue with HEP    Written Home Exercises Provided: Patient instructed to cont prior HEP.  Exercises were reviewed and Louis was able to demonstrate them prior to the end of the session.  Louis demonstrated poor understanding of the education provided.     See EMR under Patient Instructions for exercises provided prior visit.    Assessment     Pt. Presents to treatment session with continued chronic R shoulder and R knee pain. Her pain has not demonstrated very much improvement due to inconsistency with appointment attendance. Patient mentions her exercises being non painful at home and is managing pain by reducing her activity. Patient tolerated isometrics for R shoulder well with no increase in pain or discomfort. Continued education was provided on posture correction, pain relief strategies, and mechanics of movement with daily activity. Continue current treatment plan as noted.    Louis Is progressing well towards Her goals.     Pt prognosis is Fair.     Pt will continue to benefit from skilled  outpatient physical therapy to address the deficits listed in the problem list box on initial evaluation, provide pt/family education and to maximize pt's level of independence in the home and community environment.     Pt's spiritual, cultural and educational needs considered and pt agreeable to plan of care and goals.    Anticipated barriers to physical therapy: None.     Goals:  Short Term Goals: 4 weeks        Goal   Status   Pt. to report decreased right shoulder pain </= 6/10 at worst to increase tolerance for performing overhead reaching  Progressing 4/11/2023     Pt. to demonstrate proper cervical and scapula retraction requiring minimum verbal cues from PT to perform Progressing 4/11/2023     Pt. to demonstrate an increase in right shoulder elevation AROM to >120 deg. to promote greater ease with lifting tasks.  Progressing 4/11/2023    Pt. to demonstrate an increase in right shoulder internal and external AROM to +10 deg. to promote greater ease self care skills  Progressing 4/11/2023    Pt to tolerate HEP to improve ROM and independence with ADL's  Progressing 4/11/2023          Long Term Goals: 8 weeks     Goal Status    Pt. to report decreased right shoulder pain </= 3/10 at worst to increase tolerance for performing overhead reaching  Progressing 4/11/2023     Pt. to demonstrate proper cervical and scapula retraction requiring minimum verbal cues from PT to perform Progressing 4/11/2023    Pt. to demonstrate an increase in right shoulder elevation AROM to >150 deg. to promote greater ease with lifting tasks.  Progressing 4/11/2023    Pt. to demonstrate an increase in right shoulder internal and external AROM to +15 deg. to promote greater ease self care skills  Progressing 4/11/2023     Pt. to demonstrate increased MMT for right shoulder flexion to 4/5 to improve ADL tolerance   Progressing 4/11/2023    Pt. to demonstrate increased MMT for mid-trap/lower trap strength to 4/5 to improve scapula  stability during ADL and home related chores.  Progressing 4/11/2023    Pt. to demonstrate increased MMT to serratus anterior to 4/5 to improve scapula stability during repetitive UE tasks  Progressing 4/11/2023    Pt to tolerate HEP to improve ROM and independence with ADL's  Progressing 4/11/2023        Plan     Progress duration, workload, and resistance levels of the Therapeutic Activities and Exercises if the patient does not exhibit any pain, swelling, or other symptoms. Progress instruction in-home exercise progression and modification, including symptom management.    Bayron Rodas, PT ,DPT  4/11/2023

## 2023-04-12 ENCOUNTER — CLINICAL SUPPORT (OUTPATIENT)
Dept: REHABILITATION | Facility: HOSPITAL | Age: 78
End: 2023-04-12
Payer: MEDICARE

## 2023-04-12 DIAGNOSIS — R29.3 POSTURE IMBALANCE: ICD-10-CM

## 2023-04-12 DIAGNOSIS — M25.511 CHRONIC RIGHT SHOULDER PAIN: ICD-10-CM

## 2023-04-12 DIAGNOSIS — M25.611 DECREASED RIGHT SHOULDER RANGE OF MOTION: Primary | ICD-10-CM

## 2023-04-12 DIAGNOSIS — G89.29 CHRONIC RIGHT SHOULDER PAIN: ICD-10-CM

## 2023-04-12 PROCEDURE — 97110 THERAPEUTIC EXERCISES: CPT | Mod: PN

## 2023-04-12 PROCEDURE — 97112 NEUROMUSCULAR REEDUCATION: CPT | Mod: PN

## 2023-04-19 ENCOUNTER — CLINICAL SUPPORT (OUTPATIENT)
Dept: REHABILITATION | Facility: HOSPITAL | Age: 78
End: 2023-04-19
Payer: MEDICARE

## 2023-04-19 DIAGNOSIS — M25.611 DECREASED RIGHT SHOULDER RANGE OF MOTION: Primary | ICD-10-CM

## 2023-04-19 DIAGNOSIS — G89.29 CHRONIC RIGHT SHOULDER PAIN: ICD-10-CM

## 2023-04-19 DIAGNOSIS — R29.3 POSTURE IMBALANCE: ICD-10-CM

## 2023-04-19 DIAGNOSIS — M25.511 CHRONIC RIGHT SHOULDER PAIN: ICD-10-CM

## 2023-04-19 PROCEDURE — 97530 THERAPEUTIC ACTIVITIES: CPT | Mod: PN

## 2023-04-19 PROCEDURE — 97110 THERAPEUTIC EXERCISES: CPT | Mod: PN

## 2023-04-19 NOTE — PROGRESS NOTES
Physical Therapy Daily Treatment Note     Name: Louis Bronson  Clinic Number: [id number]    Therapy Diagnosis:   Encounter Diagnoses   Name Primary?    Decreased right shoulder range of motion Yes    Chronic right shoulder pain     Posture imbalance        Physician: Nhan Bronson MD    Visit Date: 2023    Physician Orders: PT Eval and Treat   Medical Diagnosis from Referral:   M25.511 (ICD-10-CM) - Pain in right shoulder   G89.29 (ICD-10-CM) - Other chronic pain     Evaluation Date: 2023  Plan of Care Expiration: 23 or  Visit  Authorization Period Expiration: 2023  Progress Note: 2023  Visit # / Visits authorized:   Remaining Visits Scheduled -  Consecutive Visits -    ID: [id number]    Time In: 1:00 PM  Time Out: 1:45 PM  Billable Time: 45 minutes (1TE, 2TA)  Non-Billable Time: 00 minutes    Precautions: Standard and HTN  Insurance: Payor: [employer]ARE / Plan: [employer] / Product Type: Medicare Advantage /     Subjective     Pt. Reports: Pain improved since last treatment visit. Continued adherence to home exercise which she attributes to improving her function and pain.    She is compliant with home exercise program.  Response to previous treatment: 1st treatment         Pre-Treatment Pain Ratin/10 R shoulder; 2/10 R knee  Location: right shoulder and R knee    Objective     Louis received therapeutic exercises to develop strength, endurance, ROM, flexibility, and posture for 20 minutes including:    Warm-up      Supine    Dowel Flexion/External Rotation 3x10  Resisted Shoulder Flexion/Abduction 2x10, Red Theraband     Seated    Shoulder External Rotation, x20, Yellow Theraband  Thoracic extensions 3x10    Standing    Dowel Abduction/Internal Rotation 2x10  Horizontal Abduction 2x10, Red Theraband     *Bold exercise performed       Louis received the following manual therapy techniques: Joint mobilizations, Manual traction,  Myofacial release, and Soft tissue Mobilization were applied to the: Right shoulder for 00 minutes, including:      Louis participated in neuromuscular re-education activities to improve: Balance, Coordination, Kinesthetic, Proprioception, Posture, and Neuromuscular Control for 25 minutes. The following activities were included:  Shoulder Isometric Set 1x10 with a 3' sec hold   Shoulder axial loading/weight bearing flexion on ball CW/CCW 3x10  Shoulder axial loading/weight bearing abduction on ball CW/CCW 3x10  Scapular Squeezes x30    Isometrics shoulder flexion/extension/ER/Abduction 20x 5'' hold each direction  Open books 2x10 B sides  Pull Downs x20, Yellow Theraband   Rows x20, Yellow Theraband     Louis participated in dynamic functional therapeutic activities to improve functional performance for 00  minutes, including:        Home Exercises Provided and Patient Education Provided     Education provided:   - Continue with HEP    Written Home Exercises Provided: Patient instructed to cont prior HEP.  Exercises were reviewed and Louis was able to demonstrate them prior to the end of the session.  Louis demonstrated poor understanding of the education provided.     See EMR under Patient Instructions for exercises provided prior visit.    Assessment     Pt. Presents to treatment session with continued pain in R shoulder but has noted improvement in function and overall pain level at R shoulder since last visit. Patient is pleased with progress. Patient attributes this progress to her home exercise plan. Improvement in activity tolerance is noted throughout treatment session. Patient to continue current treatment plan.     Louis Is progressing well towards Her goals.     Pt prognosis is Fair.     Pt will continue to benefit from skilled outpatient physical therapy to address the deficits listed in the problem list box on initial evaluation, provide pt/family education and to maximize pt's level of independence in the  home and community environment.     Pt's spiritual, cultural and educational needs considered and pt agreeable to plan of care and goals.    Anticipated barriers to physical therapy: None.     Goals:  Short Term Goals: 4 weeks        Goal   Status   Pt. to report decreased right shoulder pain </= 6/10 at worst to increase tolerance for performing overhead reaching  Progressing 4/19/2023     Pt. to demonstrate proper cervical and scapula retraction requiring minimum verbal cues from PT to perform Progressing 4/19/2023     Pt. to demonstrate an increase in right shoulder elevation AROM to >120 deg. to promote greater ease with lifting tasks.  Progressing 4/19/2023    Pt. to demonstrate an increase in right shoulder internal and external AROM to +10 deg. to promote greater ease self care skills  Progressing 4/19/2023    Pt to tolerate HEP to improve ROM and independence with ADL's  Progressing 4/19/2023          Long Term Goals: 8 weeks     Goal Status    Pt. to report decreased right shoulder pain </= 3/10 at worst to increase tolerance for performing overhead reaching  Progressing 4/19/2023     Pt. to demonstrate proper cervical and scapula retraction requiring minimum verbal cues from PT to perform Progressing 4/19/2023    Pt. to demonstrate an increase in right shoulder elevation AROM to >150 deg. to promote greater ease with lifting tasks.  Progressing 4/19/2023    Pt. to demonstrate an increase in right shoulder internal and external AROM to +15 deg. to promote greater ease self care skills  Progressing 4/19/2023     Pt. to demonstrate increased MMT for right shoulder flexion to 4/5 to improve ADL tolerance   Progressing 4/19/2023    Pt. to demonstrate increased MMT for mid-trap/lower trap strength to 4/5 to improve scapula stability during ADL and home related chores.  Progressing 4/19/2023    Pt. to demonstrate increased MMT to serratus anterior to 4/5 to improve scapula stability during repetitive UE tasks   Progressing 4/19/2023    Pt to tolerate HEP to improve ROM and independence with ADL's  Progressing 4/19/2023        Plan     Progress duration, workload, and resistance levels of the Therapeutic Activities and Exercises if the patient does not exhibit any pain, swelling, or other symptoms. Progress instruction in-home exercise progression and modification, including symptom management.    Bayron Rodas, PT ,DPT  4/19/2023

## 2023-05-04 ENCOUNTER — NURSE TRIAGE (OUTPATIENT)
Dept: ADMINISTRATIVE | Facility: CLINIC | Age: 78
End: 2023-05-04
Payer: MEDICARE

## 2023-05-04 NOTE — TELEPHONE ENCOUNTER
Sonny, Louis's son calling states Louis currently c/o severe headache with minimal relief from Tylenol & blurred vision for past few days but headache is worse today. Tylenol last taken last night. Son states Louis rolled & fell off bed & hit head corner of dresser a few weeks ago.  Son states Louis reports swelling & bruised to right side of head. Son reports Louis with intermittent episodes of being disoriented since head injury. Advised per triage protocol to go to nearest ED now for physician tri. Instructed to call  now if no immediate  or unable to transport pt to hospital. V/u.   Reason for Disposition   SEVERE headache, states 'worst headache' of life    Additional Information   Negative: Difficult to awaken or acting confused (e.g., disoriented, slurred speech)   Negative: Weakness of the face, arm or leg on one side of the body and new-onset   Negative: Numbness of the face, arm or leg on one side of the body and new-onset   Negative: Loss of speech or garbled speech and new-onset   Negative: Passed out (i.e., fainted, collapsed and was not responding)   Negative: Sounds like a life-threatening emergency to the triager   Negative: Followed a head injury within last 3 days   Negative: Unable to walk without falling   Negative: Stiff neck (can't touch chin to chest)   Negative: Possibility of carbon monoxide exposure    Protocols used: Headache-A-[place]

## 2023-07-19 ENCOUNTER — OFFICE VISIT (OUTPATIENT)
Dept: CARDIOLOGY | Facility: CLINIC | Age: 78
End: 2023-07-19
Payer: MEDICARE

## 2023-07-19 VITALS
OXYGEN SATURATION: 98 % | BODY MASS INDEX: 22.5 KG/M2 | HEART RATE: 64 BPM | WEIGHT: 115.19 LBS | SYSTOLIC BLOOD PRESSURE: 128 MMHG | DIASTOLIC BLOOD PRESSURE: 66 MMHG

## 2023-07-19 DIAGNOSIS — I10 PRIMARY HYPERTENSION: ICD-10-CM

## 2023-07-19 DIAGNOSIS — E78.5 HYPERLIPIDEMIA, UNSPECIFIED HYPERLIPIDEMIA TYPE: ICD-10-CM

## 2023-07-19 DIAGNOSIS — I48.91 ATRIAL FIBRILLATION, UNSPECIFIED TYPE: Primary | ICD-10-CM

## 2023-07-19 PROCEDURE — 1126F AMNT PAIN NOTED NONE PRSNT: CPT | Mod: CPTII,S$GLB,, | Performed by: INTERNAL MEDICINE

## 2023-07-19 PROCEDURE — 99214 PR OFFICE/OUTPT VISIT, EST, LEVL IV, 30-39 MIN: ICD-10-PCS | Mod: S$GLB,,, | Performed by: INTERNAL MEDICINE

## 2023-07-19 PROCEDURE — 3074F PR MOST RECENT SYSTOLIC BLOOD PRESSURE < 130 MM HG: ICD-10-PCS | Mod: CPTII,S$GLB,, | Performed by: INTERNAL MEDICINE

## 2023-07-19 PROCEDURE — 1159F PR MEDICATION LIST DOCUMENTED IN MEDICAL RECORD: ICD-10-PCS | Mod: CPTII,S$GLB,, | Performed by: INTERNAL MEDICINE

## 2023-07-19 PROCEDURE — 1101F PR PT FALLS ASSESS DOC 0-1 FALLS W/OUT INJ PAST YR: ICD-10-PCS | Mod: CPTII,S$GLB,, | Performed by: INTERNAL MEDICINE

## 2023-07-19 PROCEDURE — 99999 PR PBB SHADOW E&M-EST. PATIENT-LVL III: ICD-10-PCS | Mod: PBBFAC,,, | Performed by: INTERNAL MEDICINE

## 2023-07-19 PROCEDURE — 1101F PT FALLS ASSESS-DOCD LE1/YR: CPT | Mod: CPTII,S$GLB,, | Performed by: INTERNAL MEDICINE

## 2023-07-19 PROCEDURE — 1126F PR PAIN SEVERITY QUANTIFIED, NO PAIN PRESENT: ICD-10-PCS | Mod: CPTII,S$GLB,, | Performed by: INTERNAL MEDICINE

## 2023-07-19 PROCEDURE — 3078F PR MOST RECENT DIASTOLIC BLOOD PRESSURE < 80 MM HG: ICD-10-PCS | Mod: CPTII,S$GLB,, | Performed by: INTERNAL MEDICINE

## 2023-07-19 PROCEDURE — 3288F PR FALLS RISK ASSESSMENT DOCUMENTED: ICD-10-PCS | Mod: CPTII,S$GLB,, | Performed by: INTERNAL MEDICINE

## 2023-07-19 PROCEDURE — 99214 OFFICE O/P EST MOD 30 MIN: CPT | Mod: S$GLB,,, | Performed by: INTERNAL MEDICINE

## 2023-07-19 PROCEDURE — 1159F MED LIST DOCD IN RCRD: CPT | Mod: CPTII,S$GLB,, | Performed by: INTERNAL MEDICINE

## 2023-07-19 PROCEDURE — 3078F DIAST BP <80 MM HG: CPT | Mod: CPTII,S$GLB,, | Performed by: INTERNAL MEDICINE

## 2023-07-19 PROCEDURE — 3074F SYST BP LT 130 MM HG: CPT | Mod: CPTII,S$GLB,, | Performed by: INTERNAL MEDICINE

## 2023-07-19 PROCEDURE — 3288F FALL RISK ASSESSMENT DOCD: CPT | Mod: CPTII,S$GLB,, | Performed by: INTERNAL MEDICINE

## 2023-07-19 PROCEDURE — 99999 PR PBB SHADOW E&M-EST. PATIENT-LVL III: CPT | Mod: PBBFAC,,, | Performed by: INTERNAL MEDICINE

## 2023-07-19 NOTE — PROGRESS NOTES
Cardiology    7/19/2023  10:30 AM    Problem list  Patient Active Problem List   Diagnosis    Vaginal atrophy    Hyperlipidemia    Hypertension    Atrial fibrillation    Decreased right shoulder range of motion    Chronic right shoulder pain    Posture imbalance       CC:  F/u    HPI:  No cardiac complaints.  Remains active in her garden.  No CP, SOB, palpitations, syncope.  No bleeding.   Tolerating meds.    Medications  Current Outpatient Medications   Medication Sig Dispense Refill    acetaminophen (TYLENOL) 500 MG tablet Take 1 tablet (500 mg total) by mouth every 6 (six) hours as needed for Pain. Se can, dianne 6 tieng uong 1 [facility] [place] nhuc 60 tablet 0    azelastine (OPTIVAR) 0.05 % ophthalmic solution Place 1 drop into both eyes 2 (two) times daily. 6 mL 3    Ca comb no.1/vit D3/B6/FA/B12 (VITAMIN D3, CALCIUM CIT-PHOS, ORAL) Take by mouth.      calcium carbonate (OS-ALAINA) 600 mg calcium (1,500 mg) Tab Take 600 mg by mouth once.      diclofenac sodium (VOLTAREN) 1 % Gel Apply 2 g topically 4 (four) times daily. 100 g 3    esomeprazole (NEXIUM) 40 MG capsule Take 1 capsule (40 mg total) by mouth before breakfast. Thuoc melissa tu 90 capsule 3    levocetirizine (XYZAL) 5 MG tablet Take 1 tablet (5 mg total) by mouth every evening. Allergy / di neel 90 tablet 3    metoprolol succinate (TOPROL-XL) 25 MG 24 hr tablet Take 1 tablet (25 mg total) by mouth once daily. Thuoc fady (va fish silvia) 90 tablet 3    apixaban (ELIQUIS) 5 mg Tab Take 1 tablet (5 mg total) by mouth 2 (two) times daily. 180 tablet 3    docusate sodium (COLACE) 100 MG capsule Take 100 mg by mouth 2 (two) times daily.       No current facility-administered medications for this visit.      Prior to Admission medications    Medication Sig Start Date End Date Taking? Authorizing Provider   acetaminophen (TYLENOL) 500 MG tablet Take 1 tablet (500 mg total) by mouth every 6 (six) hours as needed for Pain. Se can, dianne 6 tieng uong 1 gemini godwinu nhuc  2/23/23  Yes Nhan Bronson MD   azelastine (OPTIVAR) 0.05 % ophthalmic solution Place 1 drop into both eyes 2 (two) times daily. 2/23/23 2/23/24 Yes Nhan Bronson MD   Ca comb no.1/vit D3/B6/FA/B12 (VITAMIN D3, CALCIUM CIT-PHOS, ORAL) Take by mouth.   Yes Historical Provider   calcium carbonate (OS-ALAINA) 600 mg calcium (1,500 mg) Tab Take 600 mg by mouth once.   Yes Historical Provider   diclofenac sodium (VOLTAREN) 1 % Gel Apply 2 g topically 4 (four) times daily. 10/18/22  Yes Nhan Bronson MD   esomeprazole (NEXIUM) 40 MG capsule Take 1 capsule (40 mg total) by mouth before breakfast. [place] melissa tu 10/18/22  Yes Nhan Bronson MD   levocetirizine (XYZAL) 5 MG tablet Take 1 tablet (5 mg total) by mouth every evening. Allergy / di neel 10/18/22  Yes Nhan Bronson MD   metoprolol succinate (TOPROL-XL) 25 MG 24 hr tablet Take 1 tablet (25 mg total) by mouth once daily. Thu[place] fady (va fish silvia) 10/18/22  Yes Nhan Bronson MD   apixaban (ELIQUIS) 5 mg Tab Take 1 tablet (5 mg total) by mouth 2 (two) times daily. 9/14/22 7/19/23 Yes Jorge Bronson MD   apixaban (ELIQUIS) 5 mg Tab Take 1 tablet (5 mg total) by mouth 2 (two) times daily. 7/19/23   Jorge Bronson MD   docusate sodium (COLACE) 100 MG capsule Take 100 mg by mouth 2 (two) times daily.    Historical Provider         History  Past Medical History:   Diagnosis Date    H. pylori infection     Hyperlipidemia     Hypertension     Unspecified osteoarthritis, unspecified site      Past Surgical History:   Procedure Laterality Date    ESOPHAGOGASTRODUODENOSCOPY N/A 11/23/2022    Procedure: EGD (ESOPHAGOGASTRODUODENOSCOPY);  Surgeon: José Becerra MD;  Location: [facility];  Service: Endoscopy;  Laterality: N/A;    UPPER GASTROINTESTINAL ENDOSCOPY  11/23/2022     Social History     Socioeconomic History    Marital status:    Tobacco Use    Smoking status: Never    Smokeless tobacco: Never   Substance and Sexual Activity    Alcohol use: No    Drug use: No     Sexual activity: Not Currently         Allergies  Review of patient's allergies indicates:  No Known Allergies      Review of Systems   Review of Systems   Constitutional: Negative for decreased appetite, fever and weight loss.   HENT:  Negative for congestion and nosebleeds.    Eyes:  Negative for double vision, vision loss in left eye, vision loss in right eye and visual disturbance.   Cardiovascular:  Negative for chest pain, claudication, cyanosis, dyspnea on exertion, irregular heartbeat, leg swelling, near-syncope, orthopnea, palpitations, paroxysmal nocturnal dyspnea and syncope.   Respiratory:  Negative for cough, hemoptysis, shortness of breath, sleep disturbances due to breathing, snoring, sputum production and wheezing.    Endocrine: Negative for cold intolerance and heat intolerance.   Skin:  Negative for nail changes and rash.   Musculoskeletal:  Negative for joint pain, muscle cramps, muscle weakness and myalgias.   Gastrointestinal:  Negative for change in bowel habit, heartburn, hematemesis, hematochezia, hemorrhoids and melena.   Neurological:  Negative for dizziness, focal weakness and headaches.       Physical Exam  Wt Readings from Last 1 Encounters:   07/19/23 52.3 kg (115 lb 3.1 oz)     BP Readings from Last 3 Encounters:   07/19/23 128/66   05/04/23 (!) 143/85   02/23/23 120/82     Pulse Readings from Last 1 Encounters:   07/19/23 64     Body mass index is 22.5 kg/m².    Physical Exam  Vitals reviewed.   Cardiovascular:      Rate and Rhythm: Normal rate. Rhythm irregularly irregular.      Pulses:           Carotid pulses are 2+ on the right side and 2+ on the left side.       Radial pulses are 2+ on the right side and 2+ on the left side.        Dorsalis pedis pulses are 2+ on the right side and 2+ on the left side.      Heart sounds: S1 normal and S2 normal.   Pulmonary:      Effort: Pulmonary effort is normal.      Breath sounds: Normal breath sounds.   Musculoskeletal:      Right lower leg:  No edema.      Left lower leg: No edema.   Neurological:      Mental Status: She is alert.           Assessment  1. Atrial fibrillation, unspecified type.  Chronic.  [id number] = 4  Rate controlled, on Eliquis    2. Primary hypertension  Well controlled     3. Hyperlipidemia, unspecified hyperlipidemia type  Unchanged, last LDL 82 in 2022        Plan and Discussion  Continue with current meds. Refill for Eliquis sent to [facility].  Encourage to make appt with Dr Guadarrama, her PCP this year.    Follow Up  6 months      Jorge Bronson MD, F.A.C.C, F.S.C.A.I.        30 minutes were spent in chart review, documentation and review of results, and evaluation, treatment, and counseling of patient on the same day of service.    Disclaimer: This document was created using voice recognition software (Secure[employer]K*Wapp[employer] Direct). Although it may be edited, this document may contain errors related to incorrect recognition of the spoken word. Please call the physician if clarification is needed.

## 2023-09-08 DIAGNOSIS — M85.80 OSTEOPENIA, UNSPECIFIED LOCATION: Primary | ICD-10-CM

## 2023-09-08 RX ORDER — ERGOCALCIFEROL 1.25 MG/1
50000 CAPSULE ORAL
Qty: 12 CAPSULE | Refills: 3 | Status: SHIPPED | OUTPATIENT
Start: 2023-09-08

## 2023-10-26 ENCOUNTER — OFFICE VISIT (OUTPATIENT)
Dept: PRIMARY CARE CLINIC | Facility: CLINIC | Age: 78
End: 2023-10-26
Payer: MEDICARE

## 2023-10-26 VITALS
SYSTOLIC BLOOD PRESSURE: 126 MMHG | DIASTOLIC BLOOD PRESSURE: 60 MMHG | BODY MASS INDEX: 21.73 KG/M2 | OXYGEN SATURATION: 98 % | HEIGHT: 60 IN | WEIGHT: 110.69 LBS | HEART RATE: 78 BPM

## 2023-10-26 DIAGNOSIS — M25.561 CHRONIC PAIN OF BOTH KNEES: ICD-10-CM

## 2023-10-26 DIAGNOSIS — I48.91 ATRIAL FIBRILLATION, UNSPECIFIED TYPE: ICD-10-CM

## 2023-10-26 DIAGNOSIS — G89.29 CHRONIC PAIN OF BOTH KNEES: ICD-10-CM

## 2023-10-26 DIAGNOSIS — Z86.19 HISTORY OF HELICOBACTER PYLORI INFECTION: ICD-10-CM

## 2023-10-26 DIAGNOSIS — I10 BENIGN HYPERTENSION: Primary | ICD-10-CM

## 2023-10-26 DIAGNOSIS — G89.29 CHRONIC RIGHT SHOULDER PAIN: ICD-10-CM

## 2023-10-26 DIAGNOSIS — M25.562 CHRONIC PAIN OF BOTH KNEES: ICD-10-CM

## 2023-10-26 DIAGNOSIS — M25.511 CHRONIC RIGHT SHOULDER PAIN: ICD-10-CM

## 2023-10-26 DIAGNOSIS — H10.13 ALLERGIC CONJUNCTIVITIS OF BOTH EYES: ICD-10-CM

## 2023-10-26 PROCEDURE — 1125F PR PAIN SEVERITY QUANTIFIED, PAIN PRESENT: ICD-10-PCS | Mod: CPTII,S$GLB,, | Performed by: FAMILY MEDICINE

## 2023-10-26 PROCEDURE — 3288F PR FALLS RISK ASSESSMENT DOCUMENTED: ICD-10-PCS | Mod: CPTII,S$GLB,, | Performed by: FAMILY MEDICINE

## 2023-10-26 PROCEDURE — 99214 OFFICE O/P EST MOD 30 MIN: CPT | Mod: S$GLB,,, | Performed by: FAMILY MEDICINE

## 2023-10-26 PROCEDURE — 1101F PT FALLS ASSESS-DOCD LE1/YR: CPT | Mod: CPTII,S$GLB,, | Performed by: FAMILY MEDICINE

## 2023-10-26 PROCEDURE — 3288F FALL RISK ASSESSMENT DOCD: CPT | Mod: CPTII,S$GLB,, | Performed by: FAMILY MEDICINE

## 2023-10-26 PROCEDURE — 99999 PR PBB SHADOW E&M-EST. PATIENT-LVL III: CPT | Mod: PBBFAC,,, | Performed by: FAMILY MEDICINE

## 2023-10-26 PROCEDURE — 3074F SYST BP LT 130 MM HG: CPT | Mod: CPTII,S$GLB,, | Performed by: FAMILY MEDICINE

## 2023-10-26 PROCEDURE — 3074F PR MOST RECENT SYSTOLIC BLOOD PRESSURE < 130 MM HG: ICD-10-PCS | Mod: CPTII,S$GLB,, | Performed by: FAMILY MEDICINE

## 2023-10-26 PROCEDURE — 1101F PR PT FALLS ASSESS DOC 0-1 FALLS W/OUT INJ PAST YR: ICD-10-PCS | Mod: CPTII,S$GLB,, | Performed by: FAMILY MEDICINE

## 2023-10-26 PROCEDURE — 3078F DIAST BP <80 MM HG: CPT | Mod: CPTII,S$GLB,, | Performed by: FAMILY MEDICINE

## 2023-10-26 PROCEDURE — 99999 PR PBB SHADOW E&M-EST. PATIENT-LVL III: ICD-10-PCS | Mod: PBBFAC,,, | Performed by: FAMILY MEDICINE

## 2023-10-26 PROCEDURE — 1159F MED LIST DOCD IN RCRD: CPT | Mod: CPTII,S$GLB,, | Performed by: FAMILY MEDICINE

## 2023-10-26 PROCEDURE — 1125F AMNT PAIN NOTED PAIN PRSNT: CPT | Mod: CPTII,S$GLB,, | Performed by: FAMILY MEDICINE

## 2023-10-26 PROCEDURE — 1159F PR MEDICATION LIST DOCUMENTED IN MEDICAL RECORD: ICD-10-PCS | Mod: CPTII,S$GLB,, | Performed by: FAMILY MEDICINE

## 2023-10-26 PROCEDURE — 3078F PR MOST RECENT DIASTOLIC BLOOD PRESSURE < 80 MM HG: ICD-10-PCS | Mod: CPTII,S$GLB,, | Performed by: FAMILY MEDICINE

## 2023-10-26 PROCEDURE — 99214 PR OFFICE/OUTPT VISIT, EST, LEVL IV, 30-39 MIN: ICD-10-PCS | Mod: S$GLB,,, | Performed by: FAMILY MEDICINE

## 2023-10-26 RX ORDER — IBUPROFEN 800 MG/1
800 TABLET ORAL EVERY 6 HOURS PRN
COMMUNITY
Start: 2023-08-09 | End: 2023-10-26

## 2023-10-26 RX ORDER — AZELASTINE HYDROCHLORIDE 0.5 MG/ML
1 SOLUTION/ DROPS OPHTHALMIC 2 TIMES DAILY
Qty: 6 ML | Refills: 3 | Status: SHIPPED | OUTPATIENT
Start: 2023-10-26 | End: 2024-10-25

## 2023-10-26 RX ORDER — METOPROLOL SUCCINATE 25 MG/1
25 TABLET, EXTENDED RELEASE ORAL DAILY
Qty: 90 TABLET | Refills: 3 | Status: SHIPPED | OUTPATIENT
Start: 2023-10-26

## 2023-10-26 RX ORDER — INFLUENZA A VIRUS A/VICTORIA/4897/2022 IVR-238 (H1N1) ANTIGEN (FORMALDEHYDE INACTIVATED), INFLUENZA A VIRUS A/DARWIN/6/2021 IVR-227 (H3N2) ANTIGEN (FORMALDEHYDE INACTIVATED), INFLUENZA B VIRUS B/AUSTRIA/1359417/2021 BVR-26 ANTIGEN (FORMALDEHYDE INACTIVATED), INFLUENZA B VIRUS B/PHUKET/3073/2013 BVR-1B ANTIGEN (FORMALDEHYDE INACTIVATED) 15; 15; 15; 15 UG/.5ML; UG/.5ML; UG/.5ML; UG/.5ML
INJECTION, SUSPENSION INTRAMUSCULAR
COMMUNITY
Start: 2023-09-24

## 2023-10-26 RX ORDER — LEVOCETIRIZINE DIHYDROCHLORIDE 5 MG/1
5 TABLET, FILM COATED ORAL NIGHTLY
Qty: 90 TABLET | Refills: 3 | Status: SHIPPED | OUTPATIENT
Start: 2023-10-26

## 2023-10-26 RX ORDER — CELECOXIB 200 MG/1
200 CAPSULE ORAL 2 TIMES DAILY PRN
Qty: 30 CAPSULE | Refills: 2 | Status: SHIPPED | OUTPATIENT
Start: 2023-10-26

## 2023-10-26 RX ORDER — ESOMEPRAZOLE MAGNESIUM 40 MG/1
40 CAPSULE, DELAYED RELEASE ORAL
Qty: 90 CAPSULE | Refills: 3 | Status: SHIPPED | OUTPATIENT
Start: 2023-10-26

## 2023-10-26 NOTE — PROGRESS NOTES
Clinic Note  10/26/2023      Subjective:       Patient ID:  Louis is a 78 y.o. female being seen for an established visit.    Chief Complaint:  Follow-up     Follow-up-patient with hypertension, hyperlipidemia, atrial fibrillation, H pylori, chronic right shoulder pain, osteoarthritis here for follow-up.    Chronic pain-patient reports having intermittent chronic pain, worse at night and better in the morning.  Infrequently takes medications for this.  Knows that her pain is likely related to her old age.      Hypertension/atrial fibrillation-currently on metoprolol and Eliquis for this, being followed by Cardiology.  Stable     Allergic conjunctivitis of bilateral eyes-improved with Astelin nasal spray    History of H pylori/indigestion-has been trying to limit her PPI use     Vaccines-recently got flu shot    Review of Systems   Constitutional:  Negative for chills, fever, malaise/fatigue and weight loss.   HENT:  Negative for congestion, sinus pain and sore throat.    Respiratory:  Negative for cough, shortness of breath and wheezing.    Cardiovascular:  Negative for chest pain and palpitations.   Gastrointestinal:  Negative for constipation, diarrhea, nausea and vomiting.   Genitourinary:  Negative for dysuria, frequency and urgency.   Musculoskeletal:  Negative for myalgias.   Skin:  Negative for rash.   Neurological:  Negative for headaches.       Medication List with Changes/Refills   New Medications    CELECOXIB (CELEBREX) 200 MG CAPSULE    Take 1 capsule (200 mg total) by mouth 2 (two) times daily as needed for Pain (thuoc xavier (eva roberts vojaciel [facility] ibuprofen)).   Current Medications    ACETAMINOPHEN (TYLENOL) 500 MG TABLET    Take 1 tablet (500 mg total) by mouth every 6 (six) hours as needed for Pain. Se can, dianne 6 andreang uong 1 gemini cho xavier nhuc    APIXABAN (ELIQUIS) 5 MG TAB    Take 1 tablet (5 mg total) by mouth 2 (two) times daily.    DICLOFENAC SODIUM (VOLTAREN) 1 % GEL    Apply 2 g topically 4  (four) times daily.    DOCUSATE SODIUM (COLACE) 100 MG CAPSULE    Take 100 mg by mouth 2 (two) times daily.    ERGOCALCIFEROL (ERGOCALCIFEROL) 50,000 UNIT CAP    Take 1 capsule (50,000 Units total) by mouth every 7 days. Dajuan luevano    FLUAD QUAD 2023-24,65Y UP,,PF, 60 MCG (15 MCG X 4)/0.5 ML SYRG       Changed and/or Refilled Medications    Modified Medication Previous Medication    AZELASTINE (OPTIVAR) 0.05 % OPHTHALMIC SOLUTION azelastine (OPTIVAR) 0.05 % ophthalmic solution       Place 1 drop into both eyes 2 (two) times daily. Thuoc nho mat    Place 1 drop into both eyes 2 (two) times daily.    ESOMEPRAZOLE (NEXIUM) 40 MG CAPSULE esomeprazole (NEXIUM) 40 MG capsule       Take 1 capsule (40 mg total) by mouth before breakfast. Thuoc melissa tu    Take 1 capsule (40 mg total) by mouth before breakfast. Thuoc melissa tu    LEVOCETIRIZINE (XYZAL) 5 MG TABLET levocetirizine (XYZAL) 5 MG tablet       Take 1 tablet (5 mg total) by mouth every evening. Allergy / di neel    Take 1 tablet (5 mg total) by mouth every evening. Allergy / di neel    METOPROLOL SUCCINATE (TOPROL-XL) 25 MG 24 HR TABLET metoprolol succinate (TOPROL-XL) 25 MG 24 hr tablet       Take 1 tablet (25 mg total) by mouth once daily. Thuoc fady (va fish silvia)    Take 1 tablet (25 mg total) by mouth once daily. Thuoc fady (va fish silvia)   Discontinued Medications    CALCIUM CARBONATE (OS-ALAINA) 600 MG CALCIUM (1,500 MG) TAB    Take 600 mg by mouth once.    IBUPROFEN (ADVIL,MOTRIN) 800 MG TABLET    Take 800 mg by mouth every 6 (six) hours as needed.       Patient Active Problem List   Diagnosis    Vaginal atrophy    Hyperlipidemia    Hypertension    Atrial fibrillation    Decreased right shoulder range of motion    Chronic right shoulder pain    Posture imbalance           Objective:      /60   Pulse 78   Ht 5' (1.524 m)   Wt 50.2 kg (110 lb 10.7 oz)   LMP  (LMP Unknown)   SpO2 98%   BMI 21.61 kg/m²   Estimated body mass index is 21.61 kg/m² as calculated from the  following:    Height as of this encounter: 5' (1.524 m).    Weight as of this encounter: 50.2 kg (110 lb 10.7 oz).  Physical Exam  Vitals reviewed.   Constitutional:       General: She is not in acute distress.     Appearance: She is not diaphoretic.   HENT:      Head: Normocephalic and atraumatic.   Eyes:      Conjunctiva/sclera: Conjunctivae normal.   Cardiovascular:      Rate and Rhythm: Normal rate. Rhythm irregular.      Heart sounds: Normal heart sounds.   Pulmonary:      Effort: Pulmonary effort is normal. No respiratory distress.      Breath sounds: Normal breath sounds. No wheezing.   Abdominal:      General: Bowel sounds are normal.      Palpations: Abdomen is soft.   Musculoskeletal:         General: Normal range of motion.      Cervical back: Normal range of motion.   Skin:     General: Skin is warm and dry.      Findings: No erythema or rash.   Neurological:      Mental Status: She is alert and oriented to person, place, and time.   Psychiatric:         Mood and Affect: Mood and affect normal.         Behavior: Behavior normal.         Thought Content: Thought content normal.         Judgment: Judgment normal.           Assessment and Plan:     1. Benign hypertension  - blood pressure controlled  - metoprolol succinate (TOPROL-XL) 25 MG 24 hr tablet; Take 1 tablet (25 mg total) by mouth once daily. Numerate fady (va fish silvia)  Dispense: 90 tablet; Refill: 3    2. Atrial fibrillation, unspecified type  - stable on metoprolol and Eliquis    3. Allergic conjunctivitis of both eyes  - levocetirizine (XYZAL) 5 MG tablet; Take 1 tablet (5 mg total) by mouth every evening. Allergy / di neel  Dispense: 90 tablet; Refill: 3  - azelastine (OPTIVAR) 0.05 % ophthalmic solution; Place 1 drop into both eyes 2 (two) times daily. [employer] nho mat  Dispense: 6 mL; Refill: 3    4. History of Helicobacter pylori infection  - esomeprazole (NEXIUM) 40 MG capsule; Take 1 capsule (40 mg total) by mouth before breakfast. uo melissa tu   Dispense: 90 capsule; Refill: 3    5. Chronic right shoulder pain  - trial of Celebrex p.r.n. for pain  - celecoxib (CELEBREX) 200 MG capsule; Take 1 capsule (200 mg total) by mouth 2 (two) times daily as needed for Pain (thuoc xavier (dung uong roberts voi thuoc ibuprofen)).  Dispense: 30 capsule; Refill: 2    6. Chronic pain of both knees  - celecoxib (CELEBREX) 200 MG capsule; Take 1 capsule (200 mg total) by mouth 2 (two) times daily as needed for Pain (thuoc xavier (dung uong roberts voi thuoc ibuprofen)).  Dispense: 30 capsule; Refill: 2      Follow Up:   Follow up in about 6 months (around 4/26/2024) for follow-up.    Other Orders Placed This Visit:  No orders of the defined types were placed in this encounter.        Nhan Bronson MD        This note is dictated on M*Modal word recognition program.  There are word recognition mistakes that are occasionally missed on review.

## 2023-12-07 RX ORDER — AMOXICILLIN 500 MG/1
500 CAPSULE ORAL 3 TIMES DAILY
Qty: 20 CAPSULE | Refills: 0 | Status: SHIPPED | OUTPATIENT
Start: 2023-12-07

## 2023-12-07 RX ORDER — AMOXICILLIN 500 MG/1
500 CAPSULE ORAL 3 TIMES DAILY
Qty: 20 CAPSULE | Refills: 0 | Status: SHIPPED | OUTPATIENT
Start: 2023-12-07 | End: 2023-12-07

## 2023-12-07 NOTE — PROGRESS NOTES
Requesting amoxicillin as pt has responded to this in the past for facial swelling / cellulitis which has improved in the past. Willl send one time limited refill for this.

## 2024-01-10 ENCOUNTER — OFFICE VISIT (OUTPATIENT)
Dept: CARDIOLOGY | Facility: CLINIC | Age: 79
End: 2024-01-10
Payer: MEDICARE

## 2024-01-10 VITALS
OXYGEN SATURATION: 98 % | BODY MASS INDEX: 22.86 KG/M2 | SYSTOLIC BLOOD PRESSURE: 128 MMHG | HEART RATE: 84 BPM | WEIGHT: 117.06 LBS | DIASTOLIC BLOOD PRESSURE: 76 MMHG

## 2024-01-10 DIAGNOSIS — I10 PRIMARY HYPERTENSION: ICD-10-CM

## 2024-01-10 DIAGNOSIS — I48.91 ATRIAL FIBRILLATION, UNSPECIFIED TYPE: ICD-10-CM

## 2024-01-10 DIAGNOSIS — E78.5 HYPERLIPIDEMIA, UNSPECIFIED HYPERLIPIDEMIA TYPE: Primary | ICD-10-CM

## 2024-01-10 PROCEDURE — 99214 OFFICE O/P EST MOD 30 MIN: CPT | Mod: S$GLB,,, | Performed by: INTERNAL MEDICINE

## 2024-01-10 PROCEDURE — 99999 PR PBB SHADOW E&M-EST. PATIENT-LVL III: CPT | Mod: PBBFAC,,, | Performed by: INTERNAL MEDICINE

## 2024-01-10 NOTE — PROGRESS NOTES
Cardiology    1/10/2024  10:59 AM    Problem list  Patient Active Problem List   Diagnosis    Vaginal atrophy    Hyperlipidemia    Hypertension    Atrial fibrillation    Decreased right shoulder range of motion    Chronic right shoulder pain    Posture imbalance       CC:  Six-month follow-up    HPI:  She is here for six-month follow-up.  She denies any exertional symptoms.  She remains active her garden.  She reports occasional fullness in her chest when she is lying down at night.  She does have a history of stomach problems.  She stated that she had infection in his stomach and was treated by GI doctor.  Her last endoscopy was in November 2022 which showed gastritis and small hiatal hernia.  She has not taking any medications for her stomach.    Medications  Current Outpatient Medications   Medication Sig Dispense Refill    acetaminophen (TYLENOL) 500 MG tablet Take 1 tablet (500 mg total) by mouth every 6 (six) hours as needed for Pain. Se can, dianne 6 andreang uong 1 gemini cho xavier nhuc 60 tablet 0    apixaban (ELIQUIS) 5 mg Tab Take 1 tablet (5 mg total) by mouth 2 (two) times daily. 180 tablet 3    azelastine (OPTIVAR) 0.05 % ophthalmic solution Place 1 drop into both eyes 2 (two) times daily. [facility] nho mat 6 mL 3    celecoxib (CELEBREX) 200 MG capsule Take 1 capsule (200 mg total) by mouth 2 (two) times daily as needed for Pain ([facility] xavier (eva roberts voi [facility] ibuprofen)). 30 capsule 2    diclofenac sodium (VOLTAREN) 1 % Gel Apply 2 g topically 4 (four) times daily. 100 g 3    ergocalciferol (ERGOCALCIFEROL) 50,000 unit Cap Take 1 capsule (50,000 Units total) by mouth every 7 days. Dajuan xuong 12 capsule 3    esomeprazole (NEXIUM) 40 MG capsule Take 1 capsule (40 mg total) by mouth before breakfast. [facility] melissa tu 90 capsule 3    FLUAD QUAD 2023-24,65Y UP,,PF, 60 mcg (15 mcg x 4)/0.5 mL Syrg       levocetirizine (XYZAL) 5 MG tablet Take 1 tablet (5 mg total) by mouth every evening. Allergy / di neel 90  tablet 3    metoprolol succinate (TOPROL-XL) 25 MG 24 hr tablet Take 1 tablet (25 mg total) by mouth once daily. [facility] fady (va fish silvia) 90 tablet 3    amoxicillin (AMOXIL) 500 MG capsule Take 1 capsule (500 mg total) by mouth 3 (three) times daily. (Patient not taking: Reported on 1/10/2024) 20 capsule 0    docusate sodium (COLACE) 100 MG capsule Take 100 mg by mouth 2 (two) times daily.       No current facility-administered medications for this visit.      Prior to Admission medications    Medication Sig Start Date End Date Taking? Authorizing Provider   acetaminophen (TYLENOL) 500 MG tablet Take 1 tablet (500 mg total) by mouth every 6 (six) hours as needed for Pain. Se can, dianne 6 tieng uong 1 gemini praful xavier [facility] 2/23/23  Yes Nhan Bronson MD   apixaban (ELIQUIS) 5 mg Tab Take 1 tablet (5 mg total) by mouth 2 (two) times daily. 7/19/23  Yes Jorge Bronson MD   azelastine (OPTIVAR) 0.05 % ophthalmic solution Place 1 drop into both eyes 2 (two) [facility]. [facility] 10/26[facility] 10/25/24 Yes Nhan Bronson MD   celecoxib (CELEBREX) 200 MG capsule Take 1 capsule (200 mg total) by mouth 2 (two) times daily as needed for Pain ([facility] xavier (eva pachecoi [facility] ibuprofen)). 10/26/23  Yes Nhan Bronson MD   diclofenac sodium (VOLTAREN) 1 % Gel Apply 2 g topically 4 (four) times daily. 10/18/22  Yes Nhan Bronson MD   ergocalciferol (ERGOCALCIFEROL) 50,000 unit Cap Take 1 capsule (50,000 Units total) by mouth every 7 days. Dajuan luevano 9/8/23  Yes Nhan Bronson MD   esomeprazole (NEXIUM) 40 MG capsule Take 1 capsule (40 mg total) by mouth before breakfast. [facility] melissa tu 10/26/23  Yes Nhan Bronson MD   FLUAD QUAD 2023-24,65Y UP,,PF, 60 mcg (15 mcg x 4)/0.5 mL Syrg  9/24/23  Yes Provider, Historical   levocetirizine (XYZAL) 5 MG tablet Take 1 tablet (5 mg total) by mouth every evening. Allergy / di neel 10/26/23  Yes Nhan Bronson MD   metoprolol succinate (TOPROL-XL) 25 MG 24 hr tablet Take 1 tablet (25 mg  total) by mouth once daily. [place] fady (va fish silvia) 10/26/23  Yes Nhan Bronson MD   amoxicillin (AMOXIL) 500 MG capsule Take 1 capsule (500 mg total) by mouth 3 (three) times daily.  Patient not taking: Reported on 1/10/2024 12/7/23   Nhan Bronson MD   docusate sodium (COLACE) 100 MG capsule Take 100 mg by mouth 2 (two) times daily.    Provider, Historical         History  Past Medical History:   Diagnosis Date    H. pylori infection     Hyperlipidemia     Hypertension     Unspecified osteoarthritis, unspecified site      Past Surgical History:   Procedure Laterality Date    ESOPHAGOGASTRODUODENOSCOPY N/A 11/23/2022    Procedure: EGD (ESOPHAGOGASTRODUODENOSCOPY);  Surgeon: José Becerra MD;  Location: [facility];  Service: Endoscopy;  Laterality: N/A;    UPPER GASTROINTESTINAL ENDOSCOPY  11/23/2022     Social History     Socioeconomic History    Marital status:    Tobacco Use    Smoking status: Never    Smokeless tobacco: Never   Substance and Sexual Activity    Alcohol use: No    Drug use: No    Sexual activity: Not Currently         Allergies  Review of patient's allergies indicates:  No Known Allergies      Review of Systems   Review of Systems   Constitutional: Negative for decreased appetite, fever and weight loss.   HENT:  Negative for congestion and nosebleeds.    Eyes:  Negative for double vision, vision loss in left eye, vision loss in right eye and visual disturbance.   Cardiovascular:  Negative for chest pain, claudication, cyanosis, dyspnea on exertion, irregular heartbeat, leg swelling, near-syncope, orthopnea, palpitations, paroxysmal nocturnal dyspnea and syncope.   Respiratory:  Negative for cough, hemoptysis, shortness of breath, sleep disturbances due to breathing, snoring, sputum production and wheezing.    Endocrine: Negative for cold intolerance and heat intolerance.   Skin:  Negative for nail changes and rash.   Musculoskeletal:  Negative for joint pain, muscle cramps, muscle  weakness and myalgias.   Gastrointestinal:  Negative for change in bowel habit, heartburn, hematemesis, hematochezia, hemorrhoids and melena.   Neurological:  Negative for dizziness, focal weakness and headaches.         Physical Exam  Wt Readings from Last 1 Encounters:   01/10/24 53.1 kg (117 lb 1 oz)     BP Readings from Last 3 Encounters:   01/10/24 128/76   10/26/23 126/60   07/19/23 128/66     Pulse Readings from Last 1 Encounters:   01/10/24 84     Body mass index is 22.86 kg/m².    Physical Exam  Vitals reviewed.   Cardiovascular:      Rate and Rhythm: Normal rate. Rhythm irregularly irregular.      Pulses:           Carotid pulses are 2+ on the right side and 2+ on the left side.       Radial pulses are 2+ on the right side and 2+ on the left side.        Dorsalis pedis pulses are 2+ on the right side and 2+ on the left side.      Heart sounds: S1 normal and S2 normal.   Pulmonary:      Effort: Pulmonary effort is normal.      Breath sounds: Normal breath sounds.   Musculoskeletal:      Right lower leg: No edema.      Left lower leg: No edema.   Neurological:      Mental Status: She is alert.             Assessment  1. Atrial fibrillation, unspecified type José Antonio Vasc score of 4  Rate controlled, on Eliquis  - Echo; Future    2. Hyperlipidemia, unspecified hyperlipidemia type  Stable    3. Primary hypertension  Well controlled        Plan and Discussion  Discussed that her symptoms is likely GI related.  Recommend to to try Pepcid.  Patient will see her PCP and get referral to GI doctor.  Continue with her Eliquis.  Will get an echocardiogram to assess any LV dysfunction from atrial fibrillation.    Follow Up  6 months      Jorge Bronson MD, F.A.C.C, F.S.C.A.I.        30 minutes were spent in chart review, documentation and review of results, and evaluation, treatment, and counseling of patient on the same day of service.    Disclaimer: This document was created using voice recognition software (M*Modal  [employer] Direct). Although it may be edited, this document may contain errors related to incorrect recognition of the spoken word. Please call the physician if clarification is needed.

## 2024-04-03 ENCOUNTER — PATIENT MESSAGE (OUTPATIENT)
Dept: ADMINISTRATIVE | Facility: HOSPITAL | Age: 79
End: 2024-04-03
Payer: MEDICARE

## 2024-07-09 ENCOUNTER — PATIENT MESSAGE (OUTPATIENT)
Dept: ADMINISTRATIVE | Facility: HOSPITAL | Age: 79
End: 2024-07-09
Payer: MEDICARE

## 2024-07-17 ENCOUNTER — OFFICE VISIT (OUTPATIENT)
Dept: CARDIOLOGY | Facility: CLINIC | Age: 79
End: 2024-07-17
Payer: MEDICARE

## 2024-07-17 VITALS
OXYGEN SATURATION: 98 % | DIASTOLIC BLOOD PRESSURE: 68 MMHG | WEIGHT: 119.06 LBS | HEART RATE: 57 BPM | BODY MASS INDEX: 23.25 KG/M2 | SYSTOLIC BLOOD PRESSURE: 112 MMHG

## 2024-07-17 DIAGNOSIS — I48.20 CHRONIC ATRIAL FIBRILLATION: Primary | ICD-10-CM

## 2024-07-17 DIAGNOSIS — I10 PRIMARY HYPERTENSION: ICD-10-CM

## 2024-07-17 DIAGNOSIS — E78.5 HYPERLIPIDEMIA, UNSPECIFIED HYPERLIPIDEMIA TYPE: ICD-10-CM

## 2024-07-17 PROCEDURE — 1101F PT FALLS ASSESS-DOCD LE1/YR: CPT | Mod: CPTII,S$GLB,, | Performed by: INTERNAL MEDICINE

## 2024-07-17 PROCEDURE — 1159F MED LIST DOCD IN RCRD: CPT | Mod: CPTII,S$GLB,, | Performed by: INTERNAL MEDICINE

## 2024-07-17 PROCEDURE — 3288F FALL RISK ASSESSMENT DOCD: CPT | Mod: CPTII,S$GLB,, | Performed by: INTERNAL MEDICINE

## 2024-07-17 PROCEDURE — 1126F AMNT PAIN NOTED NONE PRSNT: CPT | Mod: CPTII,S$GLB,, | Performed by: INTERNAL MEDICINE

## 2024-07-17 PROCEDURE — 99999 PR PBB SHADOW E&M-EST. PATIENT-LVL III: CPT | Mod: PBBFAC,,, | Performed by: INTERNAL MEDICINE

## 2024-07-17 PROCEDURE — 3074F SYST BP LT 130 MM HG: CPT | Mod: CPTII,S$GLB,, | Performed by: INTERNAL MEDICINE

## 2024-07-17 PROCEDURE — 3078F DIAST BP <80 MM HG: CPT | Mod: CPTII,S$GLB,, | Performed by: INTERNAL MEDICINE

## 2024-07-17 PROCEDURE — 99214 OFFICE O/P EST MOD 30 MIN: CPT | Mod: S$GLB,,, | Performed by: INTERNAL MEDICINE

## 2024-07-17 NOTE — PROGRESS NOTES
Cardiology    7/17/2024  8:46 AM    Problem list  Patient Active Problem List   Diagnosis    Vaginal atrophy    Hyperlipidemia    Hypertension    Atrial fibrillation    Decreased right shoulder range of motion    Chronic right shoulder pain    Posture imbalance       CC:  Follow-up    HPI:  She is doing well.  She is still active in her garden.  She denies any angina, dyspnea exertion, palpitation or syncope.  She denies any falls.  She has been compliant with the medicines.  She denies any bleeding.  She had labs in May.    Medications  Current Outpatient Medications   Medication Sig Dispense Refill    acetaminophen (TYLENOL) 500 MG tablet Take 1 tablet (500 mg total) by mouth every 6 (six) hours as needed for Pain. Se can, dianne 6 tieng uong 1 gemini cho xavier nhuc 60 tablet 0    apixaban (ELIQUIS) 5 mg Tab Take 1 tablet (5 mg total) by mouth 2 (two) times daily. 180 tablet 3    azelastine (OPTIVAR) 0.05 % ophthalmic solution Place 1 drop into both eyes 2 (two) times daily. [facility] nho mat 6 mL 3    celecoxib (CELEBREX) 200 MG capsule Take 1 capsule (200 mg total) by mouth 2 (two) times daily as needed for Pain (uo xavier (emilieadis janesshahram roberts voi [facility] ibuprofen)). 30 capsule 2    diclofenac sodium (VOLTAREN) 1 % Gel Apply 2 g topically 4 (four) times daily. 100 g 3    ergocalciferol (ERGOCALCIFEROL) 50,000 unit Cap Take 1 capsule (50,000 Units total) by mouth every 7 days. Dajuan xuong 12 capsule 3    esomeprazole (NEXIUM) 40 MG capsule Take 1 capsule (40 mg total) by mouth before breakfast. uo melissa tu 90 capsule 3    levocetirizine (XYZAL) 5 MG tablet Take 1 tablet (5 mg total) by mouth every evening. Allergy / di neel 90 tablet 3    metoprolol succinate (TOPROL-XL) 25 MG 24 hr tablet Take 1 tablet (25 mg total) by mouth once daily. Thuo fady (va fish silvia) 90 tablet 3    amoxicillin (AMOXIL) 500 MG capsule Take 1 capsule (500 mg total) by mouth 3 (three) times daily. (Patient not taking: Reported on 1/10/2024) 20  capsule 0    docusate sodium (COLACE) 100 MG capsule Take 100 mg by mouth 2 (two) times daily. (Patient not taking: Reported on 7/17/2024)      FLUAD QUAD 2023-24,65Y UP,,PF, 60 mcg (15 mcg x 4)/0.5 mL Syrg  (Patient not taking: Reported on 7/17/2024)       No current facility-administered medications for this visit.      Prior to Admission medications    Medication Sig Start Date End Date Taking? Authorizing Provider   acetaminophen (TYLENOL) 500 MG tablet Take 1 tablet (500 mg total) by mouth every 6 (six) hours as needed for Pain. Se can, dianne 6 tieng uong 1 gemini cho xavier [facility] 2/23/23  Yes Nhan Bronson MD   apixaban (ELIQUIS) 5 mg Tab Take 1 tablet (5 mg total) by mouth 2 (two) times daily. 7/19/23  Yes Jorge Bronson MD   azelastine (OPTIVAR) 0.05 % ophthalmic solution Place 1 drop into both eyes 2 (two[facility]. [facility]/26[facility] 10/25/24 Yes Nhan Bronson MD   celecoxib (CELEBREX) 200 MG capsule Take 1 capsule (200 mg total) by mouth 2 (two) times daily as needed for Pain ([facility] xavier (eva janesshahram roberts [facility] ibuprofen)). 10/26/23  Yes Nhan Bronson MD   diclofenac sodium (VOLTAREN) 1 % Gel Apply 2 g topically 4 (four) times daily. 10/18/22  Yes Nhan Bronson MD   ergocalciferol (ERGOCALCIFEROL) 50,000 unit Cap Take 1 capsule (50,000 Units total) by mouth every 7 days. Dajuan bassem 9/8/23  Yes Nhan Bronson MD   esomeprazole (NEXIUM) 40 MG capsule Take 1 capsule (40 mg total) by mouth before breakfast. [facility] melissa tu 10/26/23  Yes Nhan Bronson MD   levocetirizine (XYZAL) 5 MG tablet Take 1 tablet (5 mg total) by mouth every evening. Allergy / di neel 10/26/23  Yes Nhan Bronson MD   metoprolol succinate (TOPROL-XL) 25 MG 24 hr tablet Take 1 tablet (25 mg total) by mouth once daily. Rosalinda shrestha (lizzy fish silvia) 10/26/23  Yes Nhan Bronson MD   amoxicillin (AMOXIL) 500 MG capsule Take 1 capsule (500 mg total) by mouth 3 (three) times daily.  Patient not taking: Reported on 1/10/2024 12/7/23    Nhan Bronson MD   docusate sodium (COLACE) 100 MG capsule Take 100 mg by mouth 2 (two) times daily.  Patient not taking: Reported on 7/17/2024    Provider, Historical   FLUAD QUAD 2023-24,65Y UP,,PF, 60 mcg (15 mcg x 4)/0.5 mL Syrg  9/24/23   Provider, Historical         History  Past Medical History:   Diagnosis Date    H. pylori infection     Hyperlipidemia     Hypertension     Unspecified osteoarthritis, unspecified site      Past Surgical History:   Procedure Laterality Date    ESOPHAGOGASTRODUODENOSCOPY N/A 11/23/2022    Procedure: EGD (ESOPHAGOGASTRODUODENOSCOPY);  Surgeon: José Becerra MD;  Location: [facility];  Service: Endoscopy;  Laterality: N/A;    UPPER GASTROINTESTINAL ENDOSCOPY  11/23/2022     Social History     Socioeconomic History    Marital status:    Tobacco Use    Smoking status: Never    Smokeless tobacco: Never   Substance and Sexual Activity    Alcohol use: No    Drug use: No    Sexual activity: Not Currently         Allergies  Review of patient's allergies indicates:  No Known Allergies      Review of Systems   Review of Systems   Constitutional: Negative for decreased appetite, fever and weight loss.   HENT:  Negative for congestion and nosebleeds.    Eyes:  Negative for double vision, vision loss in left eye, vision loss in right eye and visual disturbance.   Cardiovascular:  Negative for chest pain, claudication, cyanosis, dyspnea on exertion, irregular heartbeat, leg swelling, near-syncope, orthopnea, palpitations, paroxysmal nocturnal dyspnea and syncope.   Respiratory:  Negative for cough, hemoptysis, shortness of breath, sleep disturbances due to breathing, snoring, sputum production and wheezing.    Endocrine: Negative for cold intolerance and heat intolerance.   Skin:  Negative for nail changes and rash.   Musculoskeletal:  Negative for joint pain, muscle cramps, muscle weakness and myalgias.   Gastrointestinal:  Negative for change in bowel habit, heartburn,  hematemesis, hematochezia, hemorrhoids and melena.   Neurological:  Negative for dizziness, focal weakness and headaches.         Physical Exam  Wt Readings from Last 1 Encounters:   07/17/24 54 kg (119 lb 0.8 oz)     BP Readings from Last 3 Encounters:   07/17/24 112/68   01/10/24 128/76   10/26/23 126/60     Pulse Readings from Last 1 Encounters:   07/17/24 (!) 57     Body mass index is 23.25 kg/m².    Physical Exam  Vitals reviewed.   Cardiovascular:      Rate and Rhythm: Normal rate. Rhythm irregularly irregular.      Pulses:           Carotid pulses are 2+ on the right side and 2+ on the left side.       Radial pulses are 2+ on the right side and 2+ on the left side.        Dorsalis pedis pulses are 2+ on the right side and 2+ on the left side.      Heart sounds: S1 normal and S2 normal.   Pulmonary:      Effort: Pulmonary effort is normal.      Breath sounds: Normal breath sounds.   Musculoskeletal:      Right lower leg: No edema.      Left lower leg: No edema.   Neurological:      Mental Status: She is alert.             Assessment  1. Chronic atrial fibrillation  Stable.  Continue current treatment and monitor.    2. Primary hypertension  Controlled.  Continue current treatment and monitor    3. Hyperlipidemia, unspecified hyperlipidemia type  Stable        Plan and Discussion  She is doing well from cardiac standpoint.  She has been compliant with Eliquis.  She would be 80 years old next May and given her weight of 54 kg, she would likely need to decrease Eliquis 2.5 mg twice daily at her next birthday.    Follow Up  6 months      Jorge Bronson MD, F.A.C.C, F.S.C.A.I.      Total professional time spent for the encounter: 30 minutes  Time was spent preparing to see the patient, reviewing results of prior testing, obtaining and/or reviewing separately obtained history, performing a medically appropriate examination and interview, counseling and educating the patient/family, ordering  medications/tests/procedures, referring and communicating with other health care professionals, documenting clinical information in the electronic health record, and independently interpreting results.    Disclaimer: This document was created using voice recognition software ([employer] Direct). Although it may be edited, this document may contain errors related to incorrect recognition of the spoken word. Please call the physician if clarification is needed.

## 2024-08-23 DIAGNOSIS — H10.13 ALLERGIC CONJUNCTIVITIS OF BOTH EYES: ICD-10-CM

## 2024-08-23 NOTE — TELEPHONE ENCOUNTER
Care Due:                  Date            Visit Type   Department     Provider  --------------------------------------------------------------------------------                                EP -                              PRIMARY      [facility] PRIMARY  Last Visit: 10-      CARE ([facility])   CARE           QUANG ELIZABETH                              MYCHART                              FOLLOWUP/OF  [facility] PRIMARY  Next Visit: 08-      FICE VISIT   CARE           QUANG ELIZABETH                                                            Last  Test          Frequency    Reason                     Performed    Due Date  --------------------------------------------------------------------------------    CBC.........  12 months..  celecoxib, diclofenac....  05- 04-    CMP.........  12 months..  celecoxib, diclofenac,     05- 04-                             ergocalciferol...........    Vitamin D...  12 months..  ergocalciferol...........  Not Found    Overdue    [employer] Embedded Care Due Messages. Reference number: [id number].   8/23/2024 4:15:08 PM CDT

## 2024-08-24 RX ORDER — LEVOCETIRIZINE DIHYDROCHLORIDE 5 MG/1
TABLET, FILM COATED ORAL
Qty: 90 TABLET | Refills: 0 | Status: SHIPPED | OUTPATIENT
Start: 2024-08-24

## 2024-08-24 NOTE — TELEPHONE ENCOUNTER
Refill Routing Note   Medication(s) are not appropriate for processing by [facility] for the following reason(s):        Outside of protocol    [facility] action(s):  Route  Approve   Requires labs : Yes             Appointments  past 12m or future 3m with PCP    Date Provider   Last Visit   10/26/2023 Nhan Bronson MD   Next Visit   8/26/2024 Nhan Bronson MD   ED visits in past 90 days: 0        Note composed:12:09 PM 08/24/2024

## 2024-08-26 ENCOUNTER — OFFICE VISIT (OUTPATIENT)
Dept: PRIMARY CARE CLINIC | Facility: CLINIC | Age: 79
End: 2024-08-26
Payer: MEDICARE

## 2024-08-26 VITALS
SYSTOLIC BLOOD PRESSURE: 138 MMHG | HEIGHT: 60 IN | RESPIRATION RATE: 16 BRPM | OXYGEN SATURATION: 97 % | HEART RATE: 73 BPM | WEIGHT: 118.38 LBS | BODY MASS INDEX: 23.24 KG/M2 | DIASTOLIC BLOOD PRESSURE: 83 MMHG

## 2024-08-26 DIAGNOSIS — R79.9 ABNORMAL FINDING OF BLOOD CHEMISTRY, UNSPECIFIED: ICD-10-CM

## 2024-08-26 DIAGNOSIS — I10 BENIGN HYPERTENSION: ICD-10-CM

## 2024-08-26 DIAGNOSIS — Z86.19 HISTORY OF HELICOBACTER PYLORI INFECTION: Primary | ICD-10-CM

## 2024-08-26 DIAGNOSIS — M85.80 OSTEOPENIA, UNSPECIFIED LOCATION: ICD-10-CM

## 2024-08-26 DIAGNOSIS — E78.5 HYPERLIPIDEMIA, UNSPECIFIED HYPERLIPIDEMIA TYPE: ICD-10-CM

## 2024-08-26 PROCEDURE — 99999 PR PBB SHADOW E&M-EST. PATIENT-LVL III: CPT | Mod: PBBFAC,,, | Performed by: FAMILY MEDICINE

## 2024-08-26 PROCEDURE — 3079F DIAST BP 80-89 MM HG: CPT | Mod: CPTII,S$GLB,, | Performed by: FAMILY MEDICINE

## 2024-08-26 PROCEDURE — 1101F PT FALLS ASSESS-DOCD LE1/YR: CPT | Mod: CPTII,S$GLB,, | Performed by: FAMILY MEDICINE

## 2024-08-26 PROCEDURE — 1126F AMNT PAIN NOTED NONE PRSNT: CPT | Mod: CPTII,S$GLB,, | Performed by: FAMILY MEDICINE

## 2024-08-26 PROCEDURE — 1159F MED LIST DOCD IN RCRD: CPT | Mod: CPTII,S$GLB,, | Performed by: FAMILY MEDICINE

## 2024-08-26 PROCEDURE — 99214 OFFICE O/P EST MOD 30 MIN: CPT | Mod: S$GLB,,, | Performed by: FAMILY MEDICINE

## 2024-08-26 PROCEDURE — 3075F SYST BP GE 130 - 139MM HG: CPT | Mod: CPTII,S$GLB,, | Performed by: FAMILY MEDICINE

## 2024-08-26 PROCEDURE — 3288F FALL RISK ASSESSMENT DOCD: CPT | Mod: CPTII,S$GLB,, | Performed by: FAMILY MEDICINE

## 2024-08-26 RX ORDER — ERGOCALCIFEROL 1.25 MG/1
50000 CAPSULE ORAL
Qty: 12 CAPSULE | Refills: 3 | Status: SHIPPED | OUTPATIENT
Start: 2024-08-26

## 2024-08-26 NOTE — PROGRESS NOTES
Clinic Note  8/26/2024      Subjective:       Patient ID:  Louis is a 79 y.o. female being seen for an established visit.    Chief Complaint: Follow-up and Medication Refill    Follow-up-patient with hypertension, hyperlipidemia, atrial fibrillation, vaginal atrophy, history of H pylori here for checkup.    History of H pylori - patient with reported history of H pylori.  Had an EGD in 2022 which was unremarkable, has had multiple H pylori stool antigen test which have been negative.  Patient would like to get checked again, does have intermittent epigastric pain.  Takes her Nexium every other day.    Hypertension/atrial fibrillation-being followed by Cardiology, denies any chest pain, dyspnea, orthopnea.  Currently on metoprolol and Eliquis 5 mg b.i.d.    Osteopenia-on vitamin-D weekly        Review of Systems   Constitutional:  Negative for chills, fever, malaise/fatigue and weight loss.   HENT:  Negative for congestion, sinus pain and sore throat.    Respiratory:  Negative for cough, shortness of breath and wheezing.    Cardiovascular:  Negative for chest pain and palpitations.   Gastrointestinal:  Negative for constipation, diarrhea, nausea and vomiting.   Genitourinary:  Negative for dysuria, frequency and urgency.   Musculoskeletal:  Negative for myalgias.   Skin:  Negative for rash.   Neurological:  Negative for headaches.       Medication List with Changes/Refills   Current Medications    ACETAMINOPHEN (TYLENOL) 500 MG TABLET    Take 1 tablet (500 mg total) by mouth every 6 (six) hours as needed for Pain. Se can, dianne 6 andreang janesong 1 gemini cho xavier nhuc    APIXABAN (ELIQUIS) 5 MG TAB    Take 1 tablet (5 mg total) by mouth 2 (two) times daily.    AZELASTINE (OPTIVAR) 0.05 % OPHTHALMIC SOLUTION    Place 1 drop into both eyes 2 (two[facility]. [facility]    CELECOXIB (CELEBREX) 200 MG CAPSULE    Take 1 capsule (200 mg total) by mouth 2 (two) times daily as needed for Pain ([facility]  ibuprofen)).    DICLOFENAC SODIUM (VOLTAREN) 1 % GEL    Apply 2 g topically 4 (four) times daily.    DOCUSATE SODIUM (COLACE) 100 MG CAPSULE    Take 100 mg by mouth 2 (two) times daily.    ESOMEPRAZOLE (NEXIUM) 40 MG CAPSULE    Take 1 capsule (40 mg total) by mouth before breakfast. Thuoc melissa tu    LEVOCETIRIZINE (XYZAL) 5 MG TABLET    TAKE 1 TABLET BY MOUTH IN THE EVENING .  ALLERGY/DI  ZARA.    METOPROLOL SUCCINATE (TOPROL-XL) 25 MG 24 HR TABLET    Take 1 tablet (25 mg total) by mouth once daily. Thuoc fady (lizzy vega)   Changed and/or Refilled Medications    Modified Medication Previous Medication    ERGOCALCIFEROL (ERGOCALCIFEROL) 50,000 UNIT CAP ergocalciferol (ERGOCALCIFEROL) 50,000 unit Cap       Take 1 capsule (50,000 Units total) by mouth every 7 days. Dajuan xuong    Take 1 capsule (50,000 Units total) by mouth every 7 days. Dajuan luevano   Discontinued Medications    AMOXICILLIN (AMOXIL) 500 MG CAPSULE    Take 1 capsule (500 mg total) by mouth 3 (three) times daily.    FLUAD QUAD 2023-24,65Y UP,,PF, 60 MCG (15 MCG X 4)/0.5 ML SYRG           Patient Active Problem List   Diagnosis    Vaginal atrophy    Hyperlipidemia    Hypertension    Atrial fibrillation    Decreased right shoulder range of motion    Chronic right shoulder pain    Posture imbalance           Objective:      /83   Pulse 73   Resp 16   Ht 5' (1.524 m)   Wt 53.7 kg (118 lb 6.2 oz)   LMP  (LMP Unknown)   SpO2 97%   BMI 23.12 kg/m²   Estimated body mass index is 23.12 kg/m² as calculated from the following:    Height as of this encounter: 5' (1.524 m).    Weight as of this encounter: 53.7 kg (118 lb 6.2 oz).  Physical Exam  Vitals reviewed.   Constitutional:       General: She is not in acute distress.     Appearance: She is not diaphoretic.   HENT:      Head: Normocephalic and atraumatic.   Eyes:      Conjunctiva/sclera: Conjunctivae normal.   Cardiovascular:      Rate and Rhythm: Normal rate. Rhythm irregular.      Heart sounds: Normal  heart sounds.   Pulmonary:      Effort: Pulmonary effort is normal. No respiratory distress.      Breath sounds: Normal breath sounds. No wheezing.   Abdominal:      General: Bowel sounds are normal.      Palpations: Abdomen is soft.   Musculoskeletal:         General: Normal range of motion.      Cervical back: Normal range of motion.   Skin:     General: Skin is warm and dry.      Findings: No erythema or rash.   Neurological:      Mental Status: She is alert and oriented to person, place, and time.   Psychiatric:         Mood and Affect: Mood and affect normal.         Behavior: Behavior normal.         Thought Content: Thought content normal.         Judgment: Judgment normal.           Assessment and Plan:     1. History of Helicobacter pylori infection  - instructed patient to stop PPI 2 weeks prior to getting tested for H pylori  - H. pylori antigen, stool; Future  - Hemoglobin A1C; Future    2. Benign hypertension  - CBC Auto Differential; Future  - Comprehensive Metabolic Panel; Future  - Lipid Panel; Future  - Hemoglobin A1C; Future    3. Hyperlipidemia, unspecified hyperlipidemia type  - Lipid Panel; Future  - Hemoglobin A1C; Future    4. Abnormal finding of blood chemistry, unspecified  - Hemoglobin A1C; Future    5. Osteopenia, unspecified location  - ergocalciferol (ERGOCALCIFEROL) 50,000 unit Cap; Take 1 capsule (50,000 Units total) by mouth every 7 days. Dajuan luevano  Dispense: 12 capsule; Refill: 3      Follow Up:   No follow-ups on file.    Other Orders Placed This Visit:  Orders Placed This Encounter   Procedures    H. pylori antigen, stool    CBC Auto Differential    Comprehensive Metabolic Panel    Lipid Panel    Hemoglobin A1C         Nhan Bronson MD        This note is dictated on M*Modal word recognition program.  There are word recognition mistakes that are occasionally missed on review.

## 2024-08-27 ENCOUNTER — LAB VISIT (OUTPATIENT)
Dept: PRIMARY CARE CLINIC | Facility: CLINIC | Age: 79
End: 2024-08-27
Payer: MEDICARE

## 2024-08-27 DIAGNOSIS — Z86.19 HISTORY OF HELICOBACTER PYLORI INFECTION: ICD-10-CM

## 2024-08-27 PROCEDURE — 87338 HPYLORI STOOL AG IA: CPT | Performed by: FAMILY MEDICINE

## 2024-08-28 RX ORDER — AZELASTINE HYDROCHLORIDE 0.5 MG/ML
SOLUTION/ DROPS OPHTHALMIC
Qty: 6 ML | Refills: 0 | Status: SHIPPED | OUTPATIENT
Start: 2024-08-28

## 2024-09-04 LAB — H PYLORI AG STL QL IA: NOT DETECTED

## 2024-12-04 ENCOUNTER — OFFICE VISIT (OUTPATIENT)
Dept: PRIMARY CARE CLINIC | Facility: CLINIC | Age: 79
End: 2024-12-04
Payer: MEDICARE

## 2024-12-04 ENCOUNTER — LAB VISIT (OUTPATIENT)
Dept: PRIMARY CARE CLINIC | Facility: CLINIC | Age: 79
End: 2024-12-04
Payer: MEDICARE

## 2024-12-04 VITALS
WEIGHT: 116.19 LBS | BODY MASS INDEX: 19.84 KG/M2 | DIASTOLIC BLOOD PRESSURE: 81 MMHG | OXYGEN SATURATION: 97 % | HEIGHT: 64 IN | SYSTOLIC BLOOD PRESSURE: 115 MMHG | HEART RATE: 81 BPM

## 2024-12-04 DIAGNOSIS — R79.9 ABNORMAL FINDING OF BLOOD CHEMISTRY, UNSPECIFIED: ICD-10-CM

## 2024-12-04 DIAGNOSIS — I10 BENIGN HYPERTENSION: ICD-10-CM

## 2024-12-04 DIAGNOSIS — M85.80 OSTEOPENIA, UNSPECIFIED LOCATION: ICD-10-CM

## 2024-12-04 DIAGNOSIS — I48.91 ATRIAL FIBRILLATION, UNSPECIFIED TYPE: ICD-10-CM

## 2024-12-04 DIAGNOSIS — E78.5 HYPERLIPIDEMIA, UNSPECIFIED HYPERLIPIDEMIA TYPE: ICD-10-CM

## 2024-12-04 DIAGNOSIS — I10 BENIGN HYPERTENSION: Primary | ICD-10-CM

## 2024-12-04 LAB
25(OH)D3+25(OH)D2 SERPL-MCNC: 46 NG/ML (ref 30–96)
ALBUMIN SERPL BCP-MCNC: 4 G/DL (ref 3.5–5.2)
ALP SERPL-CCNC: 66 U/L (ref 40–150)
ALT SERPL W/O P-5'-P-CCNC: 19 U/L (ref 10–44)
ANION GAP SERPL CALC-SCNC: 13 MMOL/L (ref 8–16)
AST SERPL-CCNC: 31 U/L (ref 10–40)
BASOPHILS # BLD AUTO: 0.05 K/UL (ref 0–0.2)
BASOPHILS NFR BLD: 0.7 % (ref 0–1.9)
BILIRUB SERPL-MCNC: 0.8 MG/DL (ref 0.1–1)
BUN SERPL-MCNC: 16 MG/DL (ref 8–23)
CALCIUM SERPL-MCNC: 10.1 MG/DL (ref 8.7–10.5)
CHLORIDE SERPL-SCNC: 103 MMOL/L (ref 95–110)
CHOLEST SERPL-MCNC: 226 MG/DL (ref 120–199)
CHOLEST/HDLC SERPL: 3.8 {RATIO} (ref 2–5)
CO2 SERPL-SCNC: 24 MMOL/L (ref 23–29)
CREAT SERPL-MCNC: 0.9 MG/DL (ref 0.5–1.4)
DIFFERENTIAL METHOD BLD: ABNORMAL
EOSINOPHIL # BLD AUTO: 0.2 K/UL (ref 0–0.5)
EOSINOPHIL NFR BLD: 2 % (ref 0–8)
ERYTHROCYTE [DISTWIDTH] IN BLOOD BY AUTOMATED COUNT: 13.5 % (ref 11.5–14.5)
EST. GFR  (NO RACE VARIABLE): >60 ML/MIN/1.73 M^2
ESTIMATED AVG GLUCOSE: 105 MG/DL (ref 68–131)
GLUCOSE SERPL-MCNC: 73 MG/DL (ref 70–110)
HBA1C MFR BLD: 5.3 % (ref 4–5.6)
HCT VFR BLD AUTO: 45 % (ref 37–48.5)
HDLC SERPL-MCNC: 60 MG/DL (ref 40–75)
HDLC SERPL: 26.5 % (ref 20–50)
HGB BLD-MCNC: 15.2 G/DL (ref 12–16)
IMM GRANULOCYTES # BLD AUTO: 0.02 K/UL (ref 0–0.04)
IMM GRANULOCYTES NFR BLD AUTO: 0.3 % (ref 0–0.5)
LDLC SERPL CALC-MCNC: 127 MG/DL (ref 63–159)
LYMPHOCYTES # BLD AUTO: 1.8 K/UL (ref 1–4.8)
LYMPHOCYTES NFR BLD: 23.5 % (ref 18–48)
MCH RBC QN AUTO: 33.8 PG (ref 27–31)
MCHC RBC AUTO-ENTMCNC: 33.8 G/DL (ref 32–36)
MCV RBC AUTO: 100 FL (ref 82–98)
MONOCYTES # BLD AUTO: 0.5 K/UL (ref 0.3–1)
MONOCYTES NFR BLD: 6.6 % (ref 4–15)
NEUTROPHILS # BLD AUTO: 5.2 K/UL (ref 1.8–7.7)
NEUTROPHILS NFR BLD: 66.9 % (ref 38–73)
NONHDLC SERPL-MCNC: 166 MG/DL
NRBC BLD-RTO: 0 /100 WBC
PLATELET # BLD AUTO: 262 K/UL (ref 150–450)
PMV BLD AUTO: 9.7 FL (ref 9.2–12.9)
POTASSIUM SERPL-SCNC: 4.7 MMOL/L (ref 3.5–5.1)
PROT SERPL-MCNC: 7.8 G/DL (ref 6–8.4)
RBC # BLD AUTO: 4.5 M/UL (ref 4–5.4)
SODIUM SERPL-SCNC: 140 MMOL/L (ref 136–145)
TRIGL SERPL-MCNC: 195 MG/DL (ref 30–150)
WBC # BLD AUTO: 7.69 K/UL (ref 3.9–12.7)

## 2024-12-04 PROCEDURE — 1101F PT FALLS ASSESS-DOCD LE1/YR: CPT | Mod: CPTII,S$GLB,, | Performed by: FAMILY MEDICINE

## 2024-12-04 PROCEDURE — 80061 LIPID PANEL: CPT | Performed by: FAMILY MEDICINE

## 2024-12-04 PROCEDURE — 99214 OFFICE O/P EST MOD 30 MIN: CPT | Mod: S$GLB,,, | Performed by: FAMILY MEDICINE

## 2024-12-04 PROCEDURE — 83036 HEMOGLOBIN GLYCOSYLATED A1C: CPT | Performed by: FAMILY MEDICINE

## 2024-12-04 PROCEDURE — 80053 COMPREHEN METABOLIC PANEL: CPT | Performed by: FAMILY MEDICINE

## 2024-12-04 PROCEDURE — 82306 VITAMIN D 25 HYDROXY: CPT | Performed by: FAMILY MEDICINE

## 2024-12-04 PROCEDURE — 3079F DIAST BP 80-89 MM HG: CPT | Mod: CPTII,S$GLB,, | Performed by: FAMILY MEDICINE

## 2024-12-04 PROCEDURE — 99999 PR PBB SHADOW E&M-EST. PATIENT-LVL III: CPT | Mod: PBBFAC,,, | Performed by: FAMILY MEDICINE

## 2024-12-04 PROCEDURE — 85025 COMPLETE CBC W/AUTO DIFF WBC: CPT | Performed by: FAMILY MEDICINE

## 2024-12-04 PROCEDURE — 3074F SYST BP LT 130 MM HG: CPT | Mod: CPTII,S$GLB,, | Performed by: FAMILY MEDICINE

## 2024-12-04 PROCEDURE — 3288F FALL RISK ASSESSMENT DOCD: CPT | Mod: CPTII,S$GLB,, | Performed by: FAMILY MEDICINE

## 2024-12-04 PROCEDURE — 1126F AMNT PAIN NOTED NONE PRSNT: CPT | Mod: CPTII,S$GLB,, | Performed by: FAMILY MEDICINE

## 2024-12-04 PROCEDURE — 1159F MED LIST DOCD IN RCRD: CPT | Mod: CPTII,S$GLB,, | Performed by: FAMILY MEDICINE

## 2024-12-04 RX ORDER — METOPROLOL SUCCINATE 25 MG/1
25 TABLET, EXTENDED RELEASE ORAL DAILY
Qty: 90 TABLET | Refills: 3 | Status: SHIPPED | OUTPATIENT
Start: 2024-12-04

## 2024-12-04 NOTE — PROGRESS NOTES
Clinic Note  12/4/2024      Subjective:       Patient ID:  Louis is a 79 y.o. female being seen for:      History of Present Illness    CHIEF COMPLAINT:  Louis presents today feeling very tired and weak.    FATIGUE AND WEAKNESS:  She reports feeling very tired and weak, expressing a lack of desire to move around. She mentions difficulty with mobility and a desire to go out but feels unable to do so due to her symptoms.    MEDICATIONS:  She is currently taking Metoprolol for heart disease, occasional medication for abdominal pain, and a bone supplement.    SOCIAL HISTORY:  She has stopped growing vegetables near her [place] due to her inability to drive and increased fatigue. Her  is also unable to drive.      ROS:  General: -fever, -chills, +fatigue, -weight gain, -weight loss  Eyes: -vision changes, -redness, -discharge  ENT: -ear pain, -nasal congestion, -sore throat  Cardiovascular: -chest pain, -palpitations, -lower extremity edema  Respiratory: -cough, -shortness of breath  Gastrointestinal: -abdominal pain, -nausea, -vomiting, -diarrhea, -constipation, -blood in stool  Genitourinary: -dysuria, -hematuria, -frequency  Musculoskeletal: -joint pain, -muscle pain  Skin: -rash, -lesion  Neurological: -headache, -dizziness, -numbness, -tingling, +weakness  Psychiatric: -anxiety, -depression, -sleep difficulty           Medication List with Changes/Refills   Current Medications    ACETAMINOPHEN (TYLENOL) 500 MG TABLET    Take 1 tablet (500 mg total) by mouth every 6 (six) hours as needed for Pain. Se can, dianne 6 tieng uong 1 gemini cho xavier [facility]    AZELASTINE (OPTIVAR) 0.05 % OPHTHALMIC SOLUTION    INSTILL 1 DROP INTO EACH EYE TWICE DAILY .  [facility]  [facility].    CELECOXIB (CELEBREX) 200 MG CAPSULE    Take 1 capsule (200 mg total) by mouth 2 (two) times daily as needed for Pain ([facility] xavier (eva mccartney [facility] ibuprofen)).    DICLOFENAC SODIUM (VOLTAREN) 1 % GEL    Apply 2 g topically 4 (four) times daily.     "DOCUSATE SODIUM (COLACE) 100 MG CAPSULE    Take 100 mg by mouth 2 (two) times daily.    ERGOCALCIFEROL (ERGOCALCIFEROL) 50,000 UNIT CAP    Take 1 capsule (50,000 Units total) by mouth every 7 days. Dajuan bassem    ESOMEPRAZOLE (NEXIUM) 40 MG CAPSULE    Take 1 capsule (40 mg total) by mouth before breakfast. uoc melissa tu    LEVOCETIRIZINE (XYZAL) 5 MG TABLET    TAKE 1 TABLET BY MOUTH IN THE EVENING .  ALLERGY/DI  ZARA.   Changed and/or Refilled Medications    Modified Medication Previous Medication    APIXABAN (ELIQUIS) 5 MG TAB apixaban (ELIQUIS) 5 mg Tab       Take 1 tablet (5 mg total) by mouth 2 (two) times daily. Loang silvia    Take 1 tablet (5 mg total) by mouth 2 (two) times daily.    METOPROLOL SUCCINATE (TOPROL-XL) 25 MG 24 HR TABLET metoprolol succinate (TOPROL-XL) 25 MG 24 hr tablet       Take 1 tablet (25 mg total) by mouth once daily. Thuoc fady (va fish silvia)    Take 1 tablet (25 mg total) by mouth once daily. Thuoc fady (va fish islvia)       Patient Active Problem List   Diagnosis    Vaginal atrophy    Hyperlipidemia    Hypertension    Atrial fibrillation    Decreased right shoulder range of motion    Chronic right shoulder pain    Posture imbalance           Objective:      /81 (BP Location: Right arm, Patient Position: Sitting)   Pulse 81   Ht 5' 4" (1.626 m)   Wt 52.7 kg (116 lb 2.9 oz)   LMP  (LMP Unknown)   SpO2 97%   BMI 19.94 kg/m²       Physical Exam    Vitals: Blood pressure: 115/81.  General: No acute distress. Well-developed. Well-nourished.  Eyes: EOMI. Sclerae anicteric.  HENT: Normocephalic. Atraumatic. Nares patent. Moist oral mucosa.  Cardiovascular: Regular rate. Regular rhythm. No murmurs. No rubs. No gallops. Normal S1, S2.  Respiratory: Normal respiratory effort. Clear to auscultation bilaterally. No rales. No rhonchi. No wheezing.  Musculoskeletal: No  obvious deformity.  Extremities: No lower extremity edema.  Neurological: Alert & oriented x3. No slurred speech. Normal " gait.  Psychiatric: Normal mood. Normal affect. Good insight. Good judgment.  Skin: Warm. Dry. No rash.           Assessment and Plan:       - Conducted routine labs to assess overall health status, including liver function, lipids, diabetes, anemia, and bone health  - Evaluated current medication regimen, focusing on heart and blood pressure medications  - Considered patient's reported fatigue and mobility issues in treatment plan  - Noted patient's history of diabetes and its potential cognitive effects    ESSENTIAL HYPERTENSION / Atrial fibrillation  - Continued metoprolol atrial fibrillation, blood pressure controlled today.  Also on Eliquis 5 mg b.i.d., being followed by Cardiology who recommended that Eliquis maybe decreased to 2.5 mg b.i.d. by her next birthday    HYPERLIPIDEMIA:  - Ordered comprehensive blood panel including liver function tests, lipid panel, diabetes screening, complete blood count (for anemia), and bone health markers.    AGE-RELATED OSTEOPOROSIS:  - Continued bone supplement.    MILD COGNITIVE IMPAIRMENT:  - Discussed the impact of diabetes on cognitive function.    GENERAL HEALTH MONITORING:  - Emphasized the importance of regular labs for monitoring overall health.  - Continued blood thinning medication with slight increase in dosage.    FOLLOW-UP:  - Follow up in 6 months      Follow Up:   Follow up in about 6 months (around 6/4/2025) for follow-up.    Other Orders Placed This Visit:  Orders Placed This Encounter    Comprehensive Metabolic Panel    CBC Auto Differential    Lipid Panel    Hemoglobin A1C    Vitamin D    metoprolol succinate (TOPROL-XL) 25 MG 24 hr tablet    apixaban (ELIQUIS) 5 mg Tab         Nhan Bronson MD        This note is dictated on M*Modal word recognition program and This note was generated with the assistance of ambient listening technology. Verbal consent was obtained by the patient and accompanying visitor(s) for the recording of patient appointment to  facilitate this note. I attest to having reviewed and edited the generated note for accuracy, though some syntax or spelling errors may persist. Please contact the author of this note for any clarification.  .  There are word recognition mistakes that are occasionally missed on review.

## 2025-02-24 DIAGNOSIS — H10.13 ALLERGIC CONJUNCTIVITIS OF BOTH EYES: ICD-10-CM

## 2025-02-24 RX ORDER — LEVOCETIRIZINE DIHYDROCHLORIDE 5 MG/1
TABLET, FILM COATED ORAL
Qty: 90 TABLET | Refills: 3 | Status: SHIPPED | OUTPATIENT
Start: 2025-02-24

## 2025-02-24 NOTE — TELEPHONE ENCOUNTER
No care due was identified.  [facility] Embedded Care Due Messages. Reference number: [id number].   2/24/2025 11:40:06 AM CST

## 2025-02-24 NOTE — TELEPHONE ENCOUNTER
Refill Decision Note   Louis Bronson  is requesting a refill authorization.  Brief Assessment and Rationale for Refill:  Approve     Medication Therapy Plan:        Comments:     Note composed:5:04 PM 02/24/2025

## 2025-04-10 DIAGNOSIS — Z78.0 MENOPAUSE: ICD-10-CM

## 2025-06-09 ENCOUNTER — OFFICE VISIT (OUTPATIENT)
Dept: PRIMARY CARE CLINIC | Facility: CLINIC | Age: 80
End: 2025-06-09
Payer: MEDICARE

## 2025-06-09 VITALS
DIASTOLIC BLOOD PRESSURE: 85 MMHG | HEART RATE: 105 BPM | OXYGEN SATURATION: 96 % | WEIGHT: 123.38 LBS | BODY MASS INDEX: 21.06 KG/M2 | TEMPERATURE: 97 F | SYSTOLIC BLOOD PRESSURE: 130 MMHG | HEIGHT: 64 IN | RESPIRATION RATE: 18 BRPM

## 2025-06-09 DIAGNOSIS — M25.561 CHRONIC PAIN OF BOTH KNEES: ICD-10-CM

## 2025-06-09 DIAGNOSIS — H10.13 ALLERGIC CONJUNCTIVITIS OF BOTH EYES: ICD-10-CM

## 2025-06-09 DIAGNOSIS — I48.91 ATRIAL FIBRILLATION, UNSPECIFIED TYPE: ICD-10-CM

## 2025-06-09 DIAGNOSIS — M85.80 OSTEOPENIA, UNSPECIFIED LOCATION: ICD-10-CM

## 2025-06-09 DIAGNOSIS — G89.29 CHRONIC PAIN OF BOTH KNEES: ICD-10-CM

## 2025-06-09 DIAGNOSIS — I27.20 PULMONARY HYPERTENSION: ICD-10-CM

## 2025-06-09 DIAGNOSIS — G89.29 CHRONIC RIGHT SHOULDER PAIN: Primary | ICD-10-CM

## 2025-06-09 DIAGNOSIS — M25.562 CHRONIC PAIN OF BOTH KNEES: ICD-10-CM

## 2025-06-09 DIAGNOSIS — M25.511 CHRONIC RIGHT SHOULDER PAIN: Primary | ICD-10-CM

## 2025-06-09 PROCEDURE — 3079F DIAST BP 80-89 MM HG: CPT | Mod: CPTII,S$GLB,, | Performed by: FAMILY MEDICINE

## 2025-06-09 PROCEDURE — 3288F FALL RISK ASSESSMENT DOCD: CPT | Mod: CPTII,S$GLB,, | Performed by: FAMILY MEDICINE

## 2025-06-09 PROCEDURE — 99214 OFFICE O/P EST MOD 30 MIN: CPT | Mod: S$GLB,,, | Performed by: FAMILY MEDICINE

## 2025-06-09 PROCEDURE — 1125F AMNT PAIN NOTED PAIN PRSNT: CPT | Mod: CPTII,S$GLB,, | Performed by: FAMILY MEDICINE

## 2025-06-09 PROCEDURE — 99999 PR PBB SHADOW E&M-EST. PATIENT-LVL IV: CPT | Mod: PBBFAC,,, | Performed by: FAMILY MEDICINE

## 2025-06-09 PROCEDURE — 3075F SYST BP GE 130 - 139MM HG: CPT | Mod: CPTII,S$GLB,, | Performed by: FAMILY MEDICINE

## 2025-06-09 PROCEDURE — 1101F PT FALLS ASSESS-DOCD LE1/YR: CPT | Mod: CPTII,S$GLB,, | Performed by: FAMILY MEDICINE

## 2025-06-09 RX ORDER — ALENDRONATE SODIUM 70 MG/1
70 TABLET ORAL
Qty: 12 TABLET | Refills: 3 | Status: SHIPPED | OUTPATIENT
Start: 2025-06-09

## 2025-06-09 RX ORDER — DICLOFENAC SODIUM 50 MG/1
50 TABLET, DELAYED RELEASE ORAL 2 TIMES DAILY PRN
Qty: 30 TABLET | Refills: 2 | Status: SHIPPED | OUTPATIENT
Start: 2025-06-09

## 2025-06-09 RX ORDER — AZELASTINE HYDROCHLORIDE 0.5 MG/ML
1 SOLUTION/ DROPS OPHTHALMIC 2 TIMES DAILY
Qty: 6 ML | Refills: 0 | Status: SHIPPED | OUTPATIENT
Start: 2025-06-09

## 2025-06-09 RX ORDER — TRAZODONE HYDROCHLORIDE 50 MG/1
50 TABLET ORAL NIGHTLY
Qty: 90 TABLET | Refills: 1 | Status: SHIPPED | OUTPATIENT
Start: 2025-06-09

## 2025-06-09 RX ORDER — ERGOCALCIFEROL 1.25 MG/1
50000 CAPSULE ORAL
Qty: 12 CAPSULE | Refills: 3 | Status: SHIPPED | OUTPATIENT
Start: 2025-06-09

## 2025-06-11 PROBLEM — I27.20 PULMONARY HYPERTENSION: Status: ACTIVE | Noted: 2025-06-11

## 2025-06-11 NOTE — PROGRESS NOTES
Clinic Note  6/18/2025      Subjective:       Patient ID:  Louis is a 80 y.o. female being seen for:      History of Present Illness    CHIEF COMPLAINT:  Louis presents today for medication refills and follow up.    MUSCULOSKELETAL PAIN:  She reports significant right shoulder pain with limited range of motion, requiring manual support for elevation. She experiences bilateral knee pain, more severe in the left lower knee, with right knee pain primarily in the upper region. These symptoms worsen in the evening.    VASCULAR SYMPTOMS:  She reports bilateral leg swelling with greenish discoloration throughout the legs. She experiences heaviness in both legs and tingling sensation in feet. These symptoms are present throughout the day and night.    SLEEP:  She reports poor sleep quality and insomnia.    CURRENT MEDICATIONS:  She takes Eliquis for chronic atrial fibrillation, Fosamax 70mg weekly for osteoporosis, Azelastine eye drops for eye symptoms, Voltaren tablets as needed for pain, and Vitamin D 50,000 units weekly.      ROS:  General: -fever, -chills, -fatigue, -weight gain, -weight loss  Eyes: -vision changes, -redness, -discharge, +eye itchiness  ENT: -ear pain, -nasal congestion, -sore throat  Cardiovascular: -chest pain, -palpitations, +lower extremity edema  Respiratory: -cough, -shortness of breath  Gastrointestinal: -abdominal pain, -nausea, -vomiting, -diarrhea, -constipation, -blood in stool  Genitourinary: -dysuria, -hematuria, -frequency  Musculoskeletal: +joint pain, -muscle pain, +limb pain, +nightime pain, +limited movement  Skin: -rash, -lesion  Neurological: -headache, -dizziness, -numbness, +tingling, +sleep disturbances, +difficulty falling asleep, +difficulty staying asleep  Psychiatric: -anxiety, -depression, +sleep difficulty           Medication List with Changes/Refills   New Medications    ALENDRONATE (FOSAMAX) 70 MG TABLET    Take 1 tablet (70 mg total) by mouth every 7 days. Julio luevano     DICLOFENAC (VOLTAREN) 50 MG EC TABLET    Take 1 tablet (50 mg total) by mouth 2 (two) times daily as needed (pain (take with food. do not take together with ibuprofen, naproxen, meloxicam)).    TRAZODONE (DESYREL) 50 MG TABLET    Take 1 tablet (50 mg total) by mouth every evening. [facility] marco antonio   Current Medications    ACETAMINOPHEN (TYLENOL) 500 MG TABLET    Take 1 tablet (500 mg total) by mouth every 6 (six) hours as needed for Pain. Se can, dianne 6 tieng uong 1 gemini cho xavier nhuc    APIXABAN (ELIQUIS) 5 MG TAB    Take 1 tablet (5 mg total) by mouth 2 (two) times daily. Julio humphreysu    DICLOFENAC SODIUM (VOLTAREN) 1 % GEL    Apply 2 g topically 4 (four) times daily.    DOCUSATE SODIUM (COLACE) 100 MG CAPSULE    Take 100 mg by mouth 2 (two) times daily.    ESOMEPRAZOLE (NEXIUM) 40 MG CAPSULE    Take 1 capsule (40 mg total) by mouth before breakfast. [facility] melissa tu    LEVOCETIRIZINE (XYZAL) 5 MG TABLET    TAKE 1 TABLET BY MOUTH IN THE EVENING .  ALLERGY/DI  ZARA.    METOPROLOL SUCCINATE (TOPROL-XL) 25 MG 24 HR TABLET    Take 1 tablet (25 mg total) by mouth once daily. [facility] fady (lizzy vega)   Changed and/or Refilled Medications    Modified Medication Previous Medication    AZELASTINE (OPTIVAR) 0.05 % OPHTHALMIC SOLUTION azelastine (OPTIVAR) 0.05 % ophthalmic solution       Place 1 drop into both eyes 2 (two) times daily.    INSTILL 1 DROP INTO EACH EYE TWICE DAILY .  [facility]  [facility]  MAT.    ERGOCALCIFEROL (ERGOCALCIFEROL) 50,000 UNIT CAP ergocalciferol (ERGOCALCIFEROL) 50,000 unit Cap       Take 1 capsule (50,000 Units total) by mouth every 7 days. Dajuan luevano    Take 1 capsule (50,000 Units total) by mouth every 7 days. Dajuan luevano   Discontinued Medications    CELECOXIB (CELEBREX) 200 MG CAPSULE    Take 1 capsule (200 mg total) by mouth 2 (two) times daily as needed for Pain ([facility] xavier (eva roberts voi [facility] ibuprofen)).       Problem List[1]        Objective:      /85   Pulse 105   Temp 97 °F (36.1 °C)   Resp 18   Ht 5'  "4" (1.626 m)   Wt 56 kg (123 lb 5.6 oz)   LMP  (LMP Unknown)   SpO2 96%   BMI 21.17 kg/m²       Physical Exam    General: No acute distress. Well-developed. Well-nourished.  Eyes: EOMI. Sclerae anicteric.  HENT: Normocephalic. Atraumatic. Nares patent. Moist oral mucosa.  Cardiovascular: Regular rate. Regular rhythm. No murmurs. No rubs. No gallops. Normal S1, S2.  Respiratory: Normal respiratory effort. Clear to auscultation bilaterally. No rales. No rhonchi. No wheezing.  Musculoskeletal: No  obvious deformity.  Extremities: No lower extremity edema.  Neurological: Alert & oriented x3. No slurred speech. Normal gait.  Psychiatric: Normal mood. Normal affect. Good insight. Good judgment.  Skin: Warm. Dry. No rash.           Assessment and Plan:     Assessment & Plan    - Assessed osteoporosis and arthritis management.  - Evaluated chronic a-fib treatment.  - Initiated treatment for insomnia.  - Assessed right shoulder and bilateral knee pain, with left knee being more severe.  - Plan corticosteroid injections for right shoulder and left knee after temporary discontinuation of anticoagulation therapy.    ## CHRONIC ATRIAL FIBRILLATION:  - Continue Eliquis for chronic atrial fibrillation management; prescription refilled.  - Louis instructed to discontinue Eliquis for 5 days prior to scheduled joint injections (right shoulder and left knee) starting Thursday or Friday of current week.  - Resume Eliquis after receiving injections.  - Explained risks of combining pain medication with anticoagulants, which potentially increases bleeding risk.    ## OSTEOPOROSIS:  - Continue Fosamax 70 mg once weekly for osteoporosis; prescription refilled.  - Discussed importance of osteoporosis medication in strengthening bones and reducing fracture risk.  - Refilled vitamin D 50,000 units once weekly for osteoporosis management.    ## RIGHT SHOULDER PAIN:  - Louis reports significant right shoulder pain.  - Ordered right shoulder " x-ray for diagnostic evaluation and referred to orthopedics.  - Recommend physical therapy for shoulder strengthening to reduce pain.  - Scheduled follow-up visit on Wednesday at 9:00 am for corticosteroid injection in right shoulder.    ## BILATERAL KNEE PAIN:  - Louis reports bilateral knee pain, worse in the left knee.  - Ordered bilateral knee x-rays for diagnostic evaluation and referred to orthopedics.  - Scheduled follow-up visit on Wednesday at 9:00 am for corticosteroid injection in left knee.  - Instructed patient to come next week after stopping Eliquis for 5 days before the injection.  - Continue Voltaren gel and tablets for arthritis pain, to be used as needed.    ## INSOMNIA:  - Louis reports not sleeping well.  - Prescribed Trazodone 50 mg at bedtime as needed for insomnia.    ## ALLERGIC CONJUNCTIVITIS:  - Louis reports itchy eyes, indicating ongoing symptoms of allergic conjunctivitis.  - Azelastine eye drops have been helpful according to patient's report; prescription refilled.    ## VITAMIN D DEFICIENCY:  - Refilled vitamin D 50,000 units once weekly for management of vitamin D deficiency.    ## GENERAL RECOMMENDATIONS:  - Recommend regular exercise to maintain joint mobility and reduce stiffness.         Follow Up:   No follow-ups on file.    Other Orders Placed This Visit:  Orders Placed This Encounter    X-Ray Knee Complete 4 Or More Views Bilat    X-ray Shoulder 2 or More Views Right    Ambulatory referral/consult to Orthopedics    alendronate (FOSAMAX) 70 MG tablet    diclofenac (VOLTAREN) 50 MG EC tablet    ergocalciferol (ERGOCALCIFEROL) 50,000 unit Cap    azelastine (OPTIVAR) 0.05 % ophthalmic solution    traZODone (DESYREL) 50 MG tablet         Nhan Bronson MD        This note is dictated on M*Modal word recognition program and This note was generated with the assistance of ambient listening technology. Verbal consent was obtained by the patient and accompanying visitor(s) for the  recording of patient appointment to facilitate this note. I attest to having reviewed and edited the generated note for accuracy, though some syntax or spelling errors may persist. Please contact the author of this note for any clarification.  .  There are word recognition mistakes that are occasionally missed on review.       [1]   Patient Active Problem List  Diagnosis    Vaginal atrophy    Hyperlipidemia    Hypertension    Atrial fibrillation    Decreased right shoulder range of motion    Chronic right shoulder pain    Posture imbalance    Pulmonary hypertension

## 2025-06-18 ENCOUNTER — OFFICE VISIT (OUTPATIENT)
Dept: PRIMARY CARE CLINIC | Facility: CLINIC | Age: 80
End: 2025-06-18
Payer: MEDICARE

## 2025-06-18 VITALS
SYSTOLIC BLOOD PRESSURE: 110 MMHG | HEART RATE: 66 BPM | TEMPERATURE: 98 F | OXYGEN SATURATION: 98 % | DIASTOLIC BLOOD PRESSURE: 70 MMHG | BODY MASS INDEX: 21.19 KG/M2 | WEIGHT: 123.44 LBS

## 2025-06-18 DIAGNOSIS — M25.562 CHRONIC PAIN OF BOTH KNEES: ICD-10-CM

## 2025-06-18 DIAGNOSIS — M25.561 CHRONIC PAIN OF BOTH KNEES: ICD-10-CM

## 2025-06-18 DIAGNOSIS — M25.511 CHRONIC RIGHT SHOULDER PAIN: Primary | ICD-10-CM

## 2025-06-18 DIAGNOSIS — G89.29 CHRONIC RIGHT SHOULDER PAIN: Primary | ICD-10-CM

## 2025-06-18 DIAGNOSIS — G89.29 CHRONIC PAIN OF BOTH KNEES: ICD-10-CM

## 2025-06-18 PROCEDURE — 20610 DRAIN/INJ JOINT/BURSA W/O US: CPT | Mod: 50,S$GLB,, | Performed by: FAMILY MEDICINE

## 2025-06-18 PROCEDURE — 3074F SYST BP LT 130 MM HG: CPT | Mod: CPTII,S$GLB,, | Performed by: FAMILY MEDICINE

## 2025-06-18 PROCEDURE — 1159F MED LIST DOCD IN RCRD: CPT | Mod: CPTII,S$GLB,, | Performed by: FAMILY MEDICINE

## 2025-06-18 PROCEDURE — 1101F PT FALLS ASSESS-DOCD LE1/YR: CPT | Mod: CPTII,S$GLB,, | Performed by: FAMILY MEDICINE

## 2025-06-18 PROCEDURE — 3078F DIAST BP <80 MM HG: CPT | Mod: CPTII,S$GLB,, | Performed by: FAMILY MEDICINE

## 2025-06-18 PROCEDURE — 99999 PR PBB SHADOW E&M-EST. PATIENT-LVL III: CPT | Mod: PBBFAC,,, | Performed by: FAMILY MEDICINE

## 2025-06-18 PROCEDURE — 99213 OFFICE O/P EST LOW 20 MIN: CPT | Mod: 25,S$GLB,, | Performed by: FAMILY MEDICINE

## 2025-06-18 PROCEDURE — 1125F AMNT PAIN NOTED PAIN PRSNT: CPT | Mod: CPTII,S$GLB,, | Performed by: FAMILY MEDICINE

## 2025-06-18 PROCEDURE — 3288F FALL RISK ASSESSMENT DOCD: CPT | Mod: CPTII,S$GLB,, | Performed by: FAMILY MEDICINE

## 2025-06-18 RX ORDER — TRIAMCINOLONE ACETONIDE 40 MG/ML
40 INJECTION, SUSPENSION INTRA-ARTICULAR; INTRAMUSCULAR
Status: DISCONTINUED | OUTPATIENT
Start: 2025-06-18 | End: 2025-06-18 | Stop reason: HOSPADM

## 2025-06-18 RX ORDER — LIDOCAINE HYDROCHLORIDE 10 MG/ML
3 INJECTION, SOLUTION INFILTRATION; PERINEURAL
Status: DISCONTINUED | OUTPATIENT
Start: 2025-06-18 | End: 2025-06-18 | Stop reason: HOSPADM

## 2025-06-18 RX ADMIN — LIDOCAINE HYDROCHLORIDE 3 ML: 10 INJECTION, SOLUTION INFILTRATION; PERINEURAL at 09:06

## 2025-06-18 RX ADMIN — TRIAMCINOLONE ACETONIDE 40 MG: 40 INJECTION, SUSPENSION INTRA-ARTICULAR; INTRAMUSCULAR at 09:06

## 2025-06-18 NOTE — PROGRESS NOTES
Clinic Note  6/18/2025      Subjective:       Patient ID:  Louis is a 80 y.o. female being seen for an established visit.    Chief Complaint: joint injections    Right shoulder pain-here for right shoulder injection.  Stopped her Eliquis 5 days ago    Bilateral knee pain-patient with bilateral knee pain, worse in the left knee.  Has not seen orthopedics yet    Review of Systems   Constitutional:  Negative for chills, fever, malaise/fatigue and weight loss.   HENT:  Negative for congestion, sinus pain and sore throat.    Respiratory:  Negative for cough, shortness of breath and wheezing.    Cardiovascular:  Negative for chest pain and palpitations.   Gastrointestinal:  Negative for constipation, diarrhea, nausea and vomiting.   Genitourinary:  Negative for dysuria, frequency and urgency.   Musculoskeletal:  Positive for joint pain. Negative for myalgias.   Skin:  Negative for rash.   Neurological:  Negative for headaches.       Medication List with Changes/Refills   Current Medications    ACETAMINOPHEN (TYLENOL) 500 MG TABLET    Take 1 tablet (500 mg total) by mouth every 6 (six) hours as needed for Pain. Se can, dianne 6 tieng uong 1 gemini cho xavier nhuc    ALENDRONATE (FOSAMAX) 70 MG TABLET    Take 1 tablet (70 mg total) by mouth every 7 days. Loang xuong    APIXABAN (ELIQUIS) 5 MG TAB    Take 1 tablet (5 mg total) by mouth 2 (two) times daily. Julio veag    AZELASTINE (OPTIVAR) 0.05 % OPHTHALMIC SOLUTION    Place 1 drop into both eyes 2 (two) times daily.    DICLOFENAC (VOLTAREN) 50 MG EC TABLET    Take 1 tablet (50 mg total) by mouth 2 (two) times daily as needed (pain (take with food. do not take together with ibuprofen, naproxen, meloxicam)).    DICLOFENAC SODIUM (VOLTAREN) 1 % GEL    Apply 2 g topically 4 (four) times daily.    DOCUSATE SODIUM (COLACE) 100 MG CAPSULE    Take 100 mg by mouth 2 (two) times daily.    ERGOCALCIFEROL (ERGOCALCIFEROL) 50,000 UNIT CAP    Take 1 capsule (50,000 Units total) by mouth every 7  "days. Dajuan bassem    ESOMEPRAZOLE (NEXIUM) 40 MG CAPSULE    Take 1 capsule (40 mg total) by mouth before breakfast. [facility] melissa tu    LEVOCETIRIZINE (XYZAL) 5 MG TABLET    TAKE 1 TABLET BY MOUTH IN THE EVENING .  ALLERGY/DI  ZARA.    METOPROLOL SUCCINATE (TOPROL-XL) 25 MG 24 HR TABLET    Take 1 tablet (25 mg total) by mouth once daily. [facility] fady (va fish silvia)    TRAZODONE (DESYREL) 50 MG TABLET    Take 1 tablet (50 mg total) by mouth every evening. [facility]       Problem List[1]        Objective:      /70 (BP Location: Left arm, Patient Position: Sitting)   Pulse 66   Temp 98 °F (36.7 °C) (Oral)   Wt 56 kg (123 lb 7.3 oz)   LMP  (LMP Unknown)   SpO2 98%   BMI 21.19 kg/m²   Estimated body mass index is 21.19 kg/m² as calculated from the following:    Height as of 6/9/25: 5' 4" (1.626 m).    Weight as of this encounter: 56 kg (123 lb 7.3 oz).  Physical Exam  Constitutional:       General: She is not in acute distress.     Appearance: She is not diaphoretic.   HENT:      Head: Normocephalic and atraumatic.   Eyes:      Conjunctiva/sclera: Conjunctivae normal.   Pulmonary:      Effort: Pulmonary effort is normal.   Musculoskeletal:         General: Normal range of motion.   Neurological:      Mental Status: She is alert and oriented to person, place, and time.   Psychiatric:         Mood and Affect: Mood and affect normal.         Behavior: Behavior normal.         Thought Content: Thought content normal.         Cognition and Memory: Memory normal.         Judgment: Judgment normal.       XR SHOULDER COMPLETE 2 OR MORE VIEWS RIGHT     CLINICAL HISTORY:  Pain in right shoulder     TECHNIQUE:  Two or three views of the right shoulder were performed.     COMPARISON:  04/07/2021.     FINDINGS:  There is mild demineralization of the osseous structures.  There is no cortical step-off.  There is no evidence of periostitis.     The glenohumeral articulation is maintained.  There is joint space narrowing the " acromioclavicular joint.  The coracoclavicular interval is within normal limits.     The visualized right hemithorax is unremarkable.  There is no evidence of pneumothorax or pulmonary contusion.     There is no evidence of acute fracture or dislocation of the right shoulder.     Impression:     Mild osteoarthrosis of the right shoulder.     No evidence of acute fracture or dislocation.      XR KNEE COMP 4 OR MORE VIEWS BILAT     CLINICAL HISTORY:  Pain in right knee     TECHNIQUE:  Two x-ray views of both knees.     COMPARISON:  None     FINDINGS:  Right knee:     The bone mineralization is within normal limits.  There is no cortical step-off.  There is no evidence of periostitis     There is mild tricompartmental joint space narrowing.  No significant joint effusion is present.     There is no evidence of a fracture or dislocation.     Left knee:     The bone mineralization is within normal limits.  There is no cortical step-off.  There is no evidence of periostitis.     There is mild tricompartmental joint space narrowing.  No significant joint effusion is present.     There is no evidence of a fracture or dislocation.     Impression:     1.  No acute process.     2.  Mild osteoarthrosis of both knees.        Assessment and Plan:     1. Chronic right shoulder pain (Primary)  - can restarted eliquis tomorrow  - R subacromial injection: R subacromial bursa  - LIDOcaine HCL 10 mg/ml (1%) injection 3 mL  - triamcinolone acetonide injection 40 mg    2. Chronic pain of both knees  - R knee injection: L knee  - LIDOcaine HCL 10 mg/ml (1%) injection 3 mL  - triamcinolone acetonide injection 40 mg      Follow Up:   No follow-ups on file.    Other Orders Placed This Visit:  No orders of the defined types were placed in this encounter.        Nhan Bronson MD        This note is dictated on M*Modal word recognition program.  There are word recognition mistakes that are occasionally missed on review.         [1]   Patient  Active Problem List  Diagnosis    Vaginal atrophy    Hyperlipidemia    Hypertension    Atrial fibrillation    Decreased right shoulder range of motion    Chronic right shoulder pain    Posture imbalance    Pulmonary hypertension

## 2025-06-18 NOTE — PROCEDURES
R subacromial injection: R subacromial bursa    Date/Time: 6/18/2025 9:00 AM    Performed by: Nhan Bronson MD  Authorized by: Nhan Bronson MD    Consent Done?:  Yes (Verbal)  Indications:  Pain and arthritis    Details:  Needle Size:  25 G  Location:  Shoulder  Site:  R subacromial bursa  Medications:  3 mL LIDOcaine HCL 10 mg/ml (1%) 10 mg/mL (1 %); 40 mg triamcinolone acetonide 40 mg/mL

## 2025-06-18 NOTE — PROCEDURES
R knee injection: L knee    Date/Time: 6/18/2025 9:00 AM    Performed by: Nhan Bronson MD  Authorized by: Nhan Bronson MD    Consent Done?:  Yes (Verbal)  Indications:  Arthritis and pain    Details:  Needle Size:  25 G  Approach:  Lateral  Location:  Knee  Site:  L knee  Medications:  3 mL LIDOcaine HCL 10 mg/ml (1%) 10 mg/mL (1 %); 40 mg triamcinolone acetonide 40 mg/mL

## 2025-06-25 ENCOUNTER — OFFICE VISIT (OUTPATIENT)
Dept: SPORTS MEDICINE | Facility: CLINIC | Age: 80
End: 2025-06-25
Payer: MEDICARE

## 2025-06-25 VITALS
WEIGHT: 120.13 LBS | HEART RATE: 96 BPM | DIASTOLIC BLOOD PRESSURE: 79 MMHG | BODY MASS INDEX: 20.62 KG/M2 | SYSTOLIC BLOOD PRESSURE: 117 MMHG

## 2025-06-25 DIAGNOSIS — M25.561 CHRONIC PAIN OF BOTH KNEES: ICD-10-CM

## 2025-06-25 DIAGNOSIS — M17.12 PRIMARY OSTEOARTHRITIS OF LEFT KNEE: Primary | ICD-10-CM

## 2025-06-25 DIAGNOSIS — M17.11 PRIMARY OSTEOARTHRITIS OF RIGHT KNEE: ICD-10-CM

## 2025-06-25 DIAGNOSIS — G89.29 CHRONIC PAIN OF BOTH KNEES: ICD-10-CM

## 2025-06-25 DIAGNOSIS — M25.562 CHRONIC PAIN OF BOTH KNEES: ICD-10-CM

## 2025-06-25 PROCEDURE — 3078F DIAST BP <80 MM HG: CPT | Mod: CPTII,S$GLB,, | Performed by: PHYSICIAN ASSISTANT

## 2025-06-25 PROCEDURE — 99999 PR PBB SHADOW E&M-EST. PATIENT-LVL III: CPT | Mod: PBBFAC,,, | Performed by: PHYSICIAN ASSISTANT

## 2025-06-25 PROCEDURE — 3074F SYST BP LT 130 MM HG: CPT | Mod: CPTII,S$GLB,, | Performed by: PHYSICIAN ASSISTANT

## 2025-06-25 PROCEDURE — 99204 OFFICE O/P NEW MOD 45 MIN: CPT | Mod: S$GLB,,, | Performed by: PHYSICIAN ASSISTANT

## 2025-06-25 PROCEDURE — 1125F AMNT PAIN NOTED PAIN PRSNT: CPT | Mod: CPTII,S$GLB,, | Performed by: PHYSICIAN ASSISTANT

## 2025-06-25 PROCEDURE — 1159F MED LIST DOCD IN RCRD: CPT | Mod: CPTII,S$GLB,, | Performed by: PHYSICIAN ASSISTANT

## 2025-06-25 PROCEDURE — 1160F RVW MEDS BY RX/DR IN RCRD: CPT | Mod: CPTII,S$GLB,, | Performed by: PHYSICIAN ASSISTANT

## 2025-06-25 PROCEDURE — 3288F FALL RISK ASSESSMENT DOCD: CPT | Mod: CPTII,S$GLB,, | Performed by: PHYSICIAN ASSISTANT

## 2025-06-25 PROCEDURE — 1101F PT FALLS ASSESS-DOCD LE1/YR: CPT | Mod: CPTII,S$GLB,, | Performed by: PHYSICIAN ASSISTANT

## 2025-06-25 NOTE — PROGRESS NOTES
CC:  Bilateral (left > right) knee pain    Patient is an 80-year-old female who presents today for initial evaluation of bilateral knee pain.  She is accompanied today by her son who assists in giving the majority of her history.  Pain has been present for quite some time, however seems to have acutely worsened over the past couple of months.  No recent falls, injuries, or trauma.  Pain is worse with prolonged walking/bearing weight.  Slightly better with rest.  She saw her primary care provider recently who administered a bilateral knee corticosteroid injections.  These provided relief for approximately 1 week before pain began to return.  No prior injuries or surgeries on the right or left knee.    - mechanical symptoms, - instability    Is affecting ADLs.  Pain is 8/10 at it's worst.    REVIEW OF SYSTEMS:   Constitution: Negative. Negative for chills, fever and night sweats.   HENT: Negative for congestion and headaches.    Eyes: Negative for blurred vision, left vision loss and right vision loss.   Cardiovascular: Negative for chest pain and syncope.   Respiratory: Negative for cough and shortness of breath.    Endocrine: Negative for polydipsia, polyphagia and polyuria.   Hematologic/Lymphatic: Negative for bleeding problem. Does not bruise/bleed easily.   Skin: Negative for dry skin, itching and rash.   Musculoskeletal: Negative for falls. Positive for bilateral (left > right) knee pain and  muscle weakness.   Gastrointestinal: Negative for abdominal pain and bowel incontinence.   Genitourinary: Negative for bladder incontinence and nocturia.   Neurological: Negative for disturbances in coordination, loss of balance and seizures.   Psychiatric/Behavioral: Negative for depression. The patient does not have insomnia.    Allergic/Immunologic: Negative for hives and persistent infections.     PAST MEDICAL HISTORY:   Past Medical History:   Diagnosis Date    H. pylori infection     Hyperlipidemia     Hypertension      Unspecified osteoarthritis, unspecified site        PAST SURGICAL HISTORY:   Past Surgical History:   Procedure Laterality Date    ESOPHAGOGASTRODUODENOSCOPY N/A 11/23/2022    Procedure: EGD (ESOPHAGOGASTRODUODENOSCOPY);  Surgeon: José Becerra MD;  Location: [facility];  Service: Endoscopy;  Laterality: N/A;    UPPER GASTROINTESTINAL ENDOSCOPY  11/23/2022       FAMILY HISTORY:   Family History   Problem Relation Name Age of Onset    Breast cancer Neg Hx      Colon cancer Neg Hx      Ovarian cancer Neg Hx         SOCIAL HISTORY:   Social History[1]    MEDICATIONS:   Current Medications[2]    ALLERGIES:   Review of patient's allergies indicates:  No Known Allergies    VITAL SIGNS:   /79   Pulse 96   Wt 54.5 kg (120 lb 2.4 oz)   LMP  (LMP Unknown)   BMI 20.62 kg/m²      PHYSICAL EXAMINATION:  General:  The patient is alert and oriented x 3.  Mood is pleasant.  Observation of ears, eyes and nose reveal no gross abnormalities.  No labored breathing observed.    BILATERAL KNEE EXAMINATION     OBSERVATION / INSPECTION   Gait:   Nonantalgic   Alignment:  Neutral   Scars:   None   Muscle atrophy: Mild  Effusion:  None   Warmth:  None   Discoloration:   none     TENDERNESS / CREPITUS (T / C):          T / C      T / C   Patella   - / -   Lateral joint line   - / -   Peripatellar medial  -  Medial joint line    + / -   Peripatellar lateral -  Medial plica   - / -   Patellar tendon -   Popliteal fossa   - / -   Quad tendon   -   Gastrocnemius   -   Prepatellar Bursa - / -   Quadricep   -   Tibial tubercle  -  Thigh/hamstring  -   Pes anserine/HS -  Fibula    -   ITB   - / -  Tibia     -   Tib/fib joint  - / -  LCL    -     MFC   - / -   MCL: Proximal  -    LFC   - / -    Distal   -          ROM: (* = pain)  PASSIVE   ACTIVE    Left :   5 / 0 / 120*   5 / 0 / 120*     Right :    5 / 0 / 120*   5 / 0 / 120*    Patellofemoral examination:  See above noted areas of tenderness.   Patella position    Subluxation /  dislocation: Centered           Sup. / Inf;   Normal   Crepitus (PF):    Absent   Patellar Mobility:       Medial-lateral:   Normal    Superior-inferior:  Normal    Inferior tilt   Normal    Patellar tendon:  Normal   Lateral tilt:    Normal   J-sign:     None   Patellofemoral grind:   No pain       MENISCAL SIGNS:     Pain on terminal extension:  -  Pain on terminal flexion:  +  Talias maneuver:  - (for pain)  Squat     deferred    LIGAMENT EXAMINATION:  ACL / Lachman:  normal (-1 to 2mm)    PCL-Post.  drawer: normal 0 to 2mm  MCL- Valgus:  normal 0 to 2mm  LCL- Varus:  normal 0 to 2mm  Pivot shift: normal (Equal)   Dial Test: difference c/w other side   At 30° flexion: normal (< 5°)    At 90° flexion: normal (< 5°)   Reverse Pivot Shift:   normal (Equal)     STRENGTH: (* = with pain) PAINFUL SIDE   Quadricep   5/5   Hamstrin/5    EXTREMITY NEURO-VASCULAR EXAMINATION:   Sensation:  Grossly intact to light touch all dermatomal regions.   Motor Function:  Fully intact motor function at hip, knee, foot and ankle    DTRs;  quadriceps and  achilles 2+.  No clonus and downgoing Babinski.    Vascular status:  DP and PT pulses 2+, brisk capillary refill, symmetric.     OTHER FINDINGS:  none    X-rays bilateral knees (2025):    X-rays including standing, weight bearing AP and flexion bilateral knees, lateral and merchant views ordered and images reviewed by me show:   No acute fracture or dislocation.  Mild medial and lateral tibiofemoral joint space narrowing with early osteophytosis present.  Moderate patellofemoral DJD with cystic change noted to the right patella.  Soft tissues appear normal.    Kellgren Saurav grade 2-3 bilaterally        ASSESSMENT:    1. Primary osteoarthritis of left knee  Ambulatory referral/consult to Orthopedics    Prior authorization Order      2. Chronic pain of both knees  Ambulatory referral/consult to Orthopedics      3. Primary osteoarthritis of right knee  Ambulatory  referral/consult to Orthopedics    Prior authorization Order          PLAN:     I made the decision to obtain old records of the patient including previous notes and imaging. New imaging was ordered today of the extremity or extremities evaluated. I independently reviewed and interpreted the radiographs and/or MRIs today as well as prior imaging, if available.    We discussed at length different treatment options including conservative vs surgical management. These include anti-inflammatories, acetaminophen, rest, ice, heat, formal physical therapy including strengthening and stretching exercises, home exercise programs, dry needling, corticosteroid versus viscosupplementation injections, and finally surgical intervention.      Medical Necessity for viscosupplementation use: After thorough evaluation of the patient, I have determined that viscosupplementation treatment is medically necessary. The patient has painful degenerative joint disease (DJD) of the knee(s) with failure of conservative treatments including lifestyle modifications and rehabilitation exercises. Oral analgesics including NSAIDs have not adequately controlled the patient's symptoms. There is radiographic evidence of Kellgren-Saurav grade II (or greater) osteoarthritic (OA) changes, or if lack of radiographic evidence, there is arthroscopic or other evidence of chondrosis of the knee(s).     Pre-authorization placed for Synvisc series injections.    Follow up in 2 weeks for bilateral knee Synvisc injections.    All questions were answered, pt will contact us for questions or concerns in the interim.      This note was generated with the assistance of ambient listening technology. Verbal consent was obtained by the patient and accompanying visitor(s) for the recording of patient appointment to facilitate this note. I attest to having reviewed and edited the generated note for accuracy, though some syntax or spelling errors may persist. Please  contact the author of this note for any clarification.               [1]   Social History  Socioeconomic History    Marital status:    Tobacco Use    Smoking status: Never    Smokeless tobacco: Never   Substance and Sexual Activity    Alcohol use: No    Drug use: No    Sexual activity: Not Currently   [2]   Current Outpatient Medications:     acetaminophen (TYLENOL) 500 MG tablet, Take 1 tablet (500 mg total) by mouth every 6 (six) hours as needed for Pain. Se can, dianne 6 tiebianca uong 1 gemini cho xavier nhuc, Disp: 60 tablet, Rfl: 0    alendronate (FOSAMAX) 70 MG tablet, Take 1 tablet (70 mg total) by mouth every 7 days. Julio luevano, Disp: 12 tablet, Rfl: 3    apixaban (ELIQUIS) 5 mg Tab, Take 1 tablet (5 mg total) by mouth 2 (two) times daily. Julio vega, Disp: 180 tablet, Rfl: 3    azelastine (OPTIVAR) 0.05 % ophthalmic solution, Place 1 drop into both eyes 2 (two) times daily., Disp: 6 mL, Rfl: 0    diclofenac sodium (VOLTAREN) 1 % Gel, Apply 2 g topically 4 (four) times daily., Disp: 100 g, Rfl: 3    ergocalciferol (ERGOCALCIFEROL) 50,000 unit Cap, Take 1 capsule (50,000 Units total) by mouth every 7 days. Dajuan luevano, Disp: 12 capsule, Rfl: 3    esomeprazole (NEXIUM) 40 MG capsule, Take 1 capsule (40 mg total) by mouth before breakfast. [facility] melissa tu, Disp: 90 capsule, Rfl: 3    metoprolol succinate (TOPROL-XL) 25 MG 24 hr tablet, Take 1 tablet (25 mg total) by mouth once daily. [facility] fady (va fish silvia), Disp: 90 tablet, Rfl: 3    traZODone (DESYREL) 50 MG tablet, Take 1 tablet (50 mg total) by mouth every evening. [facility] marco antonio, Disp: 90 tablet, Rfl: 1    diclofenac (VOLTAREN) 50 MG EC tablet, Take 1 tablet (50 mg total) by mouth 2 (two) times daily as needed (pain (take with food. do not take together with ibuprofen, naproxen, meloxicam)). (Patient not taking: Reported on 6/25/2025), Disp: 30 tablet, Rfl: 2    docusate sodium (COLACE) 100 MG capsule, Take 100 mg by mouth 2 (two) times daily. (Patient not taking:  Reported on 6/25/2025), Disp: , Rfl:     levocetirizine (XYZAL) 5 MG tablet, TAKE 1 TABLET BY MOUTH IN THE EVENING .  ALLERGY/DI  ZARA. (Patient not taking: Reported on 6/25/2025), Disp: 90 tablet, Rfl: 3

## 2025-07-09 ENCOUNTER — OFFICE VISIT (OUTPATIENT)
Dept: SPORTS MEDICINE | Facility: CLINIC | Age: 80
End: 2025-07-09
Payer: MEDICARE

## 2025-07-09 VITALS
DIASTOLIC BLOOD PRESSURE: 84 MMHG | HEART RATE: 90 BPM | BODY MASS INDEX: 20.62 KG/M2 | WEIGHT: 120.13 LBS | SYSTOLIC BLOOD PRESSURE: 126 MMHG

## 2025-07-09 DIAGNOSIS — M17.11 PRIMARY OSTEOARTHRITIS OF RIGHT KNEE: ICD-10-CM

## 2025-07-09 DIAGNOSIS — M17.12 PRIMARY OSTEOARTHRITIS OF LEFT KNEE: Primary | ICD-10-CM

## 2025-07-09 PROCEDURE — 99999 PR PBB SHADOW E&M-EST. PATIENT-LVL III: CPT | Mod: PBBFAC,,, | Performed by: PHYSICIAN ASSISTANT

## 2025-07-09 NOTE — PROGRESS NOTES
CC:  Bilateral knee pain    Patient is here for follow up of right knee pain/osteoarthritis. Pt is requesting Synvisc injection #1.  PMFH reviewed per encounter record. Has failed other conservative modalities including NSAIDS, activity modification, weight loss.     PHYSICAL EXAMINATION:      General: The patient is alert and oriented x 3. Mood is pleasant.   Observation of ears, eyes and nose reveals no gross abnormalities. No   labored breathing observed.      No signs of infection or adverse reaction to the bilateral knee.  TTP over medial and lateral joint lines  AROM 0 to 130 degrees bilaterally  Ligamentously stable  Negative meniscal testing    Assessment:  Osteoarthritis of both knees    Plan:    Bilateral knee Synvisc injections performed today. See procedure note for details.     Follow up in one week for her 2nd Synvisc injections.       All questions were answered. Instructed patient to call with questions or concerns in the interim.

## 2025-07-09 NOTE — PROCEDURES
Large Joint Aspiration/Injection: bilateral knee    Date/Time: 7/9/2025 12:15 PM    Performed by: Jim Rousseau PA-C  Authorized by: Jim Rousseau PA-C    Consent Done?:  Yes (Verbal)  Indications:  Pain and arthritis  Site marked: the procedure site was marked    Timeout: prior to procedure the correct patient, procedure, and site was verified    Prep: patient was prepped and draped in usual sterile fashion    Local anesthesia used?: No      Details:  Needle Size:  22 G  Ultrasonic Guidance for needle placement?: No    Approach:  Superior  Location:  Knee  Laterality:  Bilateral  Site:  Bilateral knee  Medications (Right):  16 mg hyaluronate 16 mg/2 mL  Medications (Left):  16 mg hyaluronate 16 mg/2 mL  Patient tolerance:  Patient tolerated the procedure well with no immediate complications    Synvisc Injection Procedure #1    A time out was performed, including verification of patient ID, procedure, site and side, availability of information and equipment, review of safety issues, and agreement with consent, the procedure site was marked.    The patient was prepped aseptically with povidone-iodine swabsticks. A diagnostic and therapeutic injection of 2cc Synvisc was given under sterile technique using a 22g x 1.5 needle from the superolateral aspect of the bilateral knee Joint in the supine position.   Louis Bronson had no adverse reactions to the medication. Pain decreased. female was instructed to apply ice to the joint for 20 minutes and avoid strenuous activities for 24-36 hours following the injection. female was warned of possible blood pressure changes during that time. Following that time, female can resume activities as prior to the injection.    female was reminded to call the clinic immediately for any adverse side effects as explained in clinic today.    Exp:  1/31/2028  Lot:  [id number]

## 2025-07-16 ENCOUNTER — OFFICE VISIT (OUTPATIENT)
Dept: SPORTS MEDICINE | Facility: CLINIC | Age: 80
End: 2025-07-16
Payer: MEDICARE

## 2025-07-16 VITALS
HEART RATE: 97 BPM | BODY MASS INDEX: 20.7 KG/M2 | WEIGHT: 120.56 LBS | SYSTOLIC BLOOD PRESSURE: 121 MMHG | DIASTOLIC BLOOD PRESSURE: 85 MMHG

## 2025-07-16 DIAGNOSIS — M17.0 PRIMARY OSTEOARTHRITIS OF BOTH KNEES: Primary | ICD-10-CM

## 2025-07-16 PROCEDURE — 99999 PR PBB SHADOW E&M-EST. PATIENT-LVL III: CPT | Mod: PBBFAC,,, | Performed by: PHYSICIAN ASSISTANT

## 2025-07-16 PROCEDURE — 1159F MED LIST DOCD IN RCRD: CPT | Mod: CPTII,S$GLB,, | Performed by: PHYSICIAN ASSISTANT

## 2025-07-16 PROCEDURE — 3079F DIAST BP 80-89 MM HG: CPT | Mod: CPTII,S$GLB,, | Performed by: PHYSICIAN ASSISTANT

## 2025-07-16 PROCEDURE — 1125F AMNT PAIN NOTED PAIN PRSNT: CPT | Mod: CPTII,S$GLB,, | Performed by: PHYSICIAN ASSISTANT

## 2025-07-16 PROCEDURE — 1101F PT FALLS ASSESS-DOCD LE1/YR: CPT | Mod: CPTII,S$GLB,, | Performed by: PHYSICIAN ASSISTANT

## 2025-07-16 PROCEDURE — 20610 DRAIN/INJ JOINT/BURSA W/O US: CPT | Mod: 50,S$GLB,, | Performed by: PHYSICIAN ASSISTANT

## 2025-07-16 PROCEDURE — 3288F FALL RISK ASSESSMENT DOCD: CPT | Mod: CPTII,S$GLB,, | Performed by: PHYSICIAN ASSISTANT

## 2025-07-16 PROCEDURE — 1160F RVW MEDS BY RX/DR IN RCRD: CPT | Mod: CPTII,S$GLB,, | Performed by: PHYSICIAN ASSISTANT

## 2025-07-16 PROCEDURE — 99499 UNLISTED E&M SERVICE: CPT | Mod: S$GLB,,, | Performed by: PHYSICIAN ASSISTANT

## 2025-07-16 PROCEDURE — 3074F SYST BP LT 130 MM HG: CPT | Mod: CPTII,S$GLB,, | Performed by: PHYSICIAN ASSISTANT

## 2025-07-16 NOTE — PROGRESS NOTES
CC:  Bilateral knee pain    Patient is here for follow up of right knee pain/osteoarthritis. Pt is requesting Synvisc injection #2.  PMFH reviewed per encounter record. Has failed other conservative modalities including NSAIDS, activity modification, weight loss.     PHYSICAL EXAMINATION:      General: The patient is alert and oriented x 3. Mood is pleasant.   Observation of ears, eyes and nose reveals no gross abnormalities. No   labored breathing observed.      No signs of infection or adverse reaction to the bilateral knee.  TTP over medial and lateral joint lines  AROM 0 to 130 degrees bilaterally  Ligamentously stable  Negative meniscal testing    Assessment:  Osteoarthritis of bilateral knees    Plan:    Bilateral knee Synvisc injections performed today. See procedure note for details.     Follow up in one week for her 3rd Synvisc injections.       All questions were answered. Instructed patient to call with questions or concerns in the interim.

## 2025-07-16 NOTE — PROCEDURES
Large Joint Aspiration/Injection: bilateral knee    Date/Time: 7/16/2025 12:15 PM    Performed by: Jim Rousseau PA-C  Authorized by: Jim Rousseau PA-C    Consent Done?:  Yes (Verbal)  Indications:  Pain and arthritis  Site marked: the procedure site was marked    Timeout: prior to procedure the correct patient, procedure, and site was verified    Prep: patient was prepped and draped in usual sterile fashion    Local anesthesia used?: No      Details:  Needle Size:  22 G  Ultrasonic Guidance for needle placement?: No    Approach:  Superior  Location:  Knee  Laterality:  Bilateral  Site:  Bilateral knee  Medications (Right):  16 mg hyaluronate 16 mg/2 mL  Medications (Left):  16 mg hyaluronate 16 mg/2 mL  Patient tolerance:  Patient tolerated the procedure well with no immediate complications    Synvisc Injection Procedure #2    A time out was performed, including verification of patient ID, procedure, site and side, availability of information and equipment, review of safety issues, and agreement with consent, the procedure site was marked.    The patient was prepped aseptically with povidone-iodine swabsticks. A diagnostic and therapeutic injection of 2cc Euflexxa was given under sterile technique using a 22g x 1.5 needle from the superolateral aspect of the bilateral knee Joint in the supine position.   Louis Bronson had no adverse reactions to the medication. Pain decreased. female was instructed to apply ice to the joint for 20 minutes and avoid strenuous activities for 24-36 hours following the injection. female was warned of possible blood pressure changes during that time. Following that time, female can resume activities as prior to the injection.    female was reminded to call the clinic immediately for any adverse side effects as explained in clinic today.    Exp:  1/31/2027  Lot:  [id number]

## 2025-07-23 ENCOUNTER — OFFICE VISIT (OUTPATIENT)
Dept: SPORTS MEDICINE | Facility: CLINIC | Age: 80
End: 2025-07-23
Payer: MEDICARE

## 2025-07-23 VITALS
HEART RATE: 57 BPM | WEIGHT: 120.56 LBS | SYSTOLIC BLOOD PRESSURE: 113 MMHG | DIASTOLIC BLOOD PRESSURE: 80 MMHG | BODY MASS INDEX: 20.7 KG/M2

## 2025-07-23 DIAGNOSIS — M17.0 PRIMARY OSTEOARTHRITIS OF BOTH KNEES: Primary | ICD-10-CM

## 2025-07-23 PROCEDURE — 20610 DRAIN/INJ JOINT/BURSA W/O US: CPT | Mod: 50,S$GLB,, | Performed by: PHYSICIAN ASSISTANT

## 2025-07-23 PROCEDURE — 99499 UNLISTED E&M SERVICE: CPT | Mod: S$GLB,,, | Performed by: PHYSICIAN ASSISTANT

## 2025-07-23 PROCEDURE — 99999 PR PBB SHADOW E&M-EST. PATIENT-LVL III: CPT | Mod: PBBFAC,,, | Performed by: PHYSICIAN ASSISTANT

## 2025-07-23 NOTE — PROCEDURES
Large Joint Aspiration/Injection: bilateral knee    Date/Time: 7/23/2025 12:15 PM    Performed by: Jim Rousseau PA-C  Authorized by: Jim Rousseau PA-C    Consent Done?:  Yes (Verbal)  Indications:  Pain and arthritis  Site marked: the procedure site was marked    Timeout: prior to procedure the correct patient, procedure, and site was verified    Prep: patient was prepped and draped in usual sterile fashion    Local anesthesia used?: No      Details:  Needle Size:  22 G  Ultrasonic Guidance for needle placement?: No    Approach:  Superior  Location:  Knee  Laterality:  Bilateral  Site:  Bilateral knee  Medications (Right):  16 mg hyaluronate 16 mg/2 mL  Medications (Left):  16 mg hyaluronate 16 mg/2 mL  Patient tolerance:  Patient tolerated the procedure well with no immediate complications    Synvisc Injection Procedure #3    A time out was performed, including verification of patient ID, procedure, site and side, availability of information and equipment, review of safety issues, and agreement with consent, the procedure site was marked.    The patient was prepped aseptically with povidone-iodine swabsticks. A diagnostic and therapeutic injection of 2cc Synvisc was given under sterile technique using a 22g x 1.5 needle from the superolateral aspect of the bilateral knee Joint in the supine position.   Louis Bronson had no adverse reactions to the medication. Pain decreased. female was instructed to apply ice to the joint for 20 minutes and avoid strenuous activities for 24-36 hours following the injection. female was warned of possible blood pressure changes during that time. Following that time, female can resume activities as prior to the injection.    female was reminded to call the clinic immediately for any adverse side effects as explained in clinic today.    Exp:  1/31/2028  Lot:  [id number]

## 2025-07-23 NOTE — PROGRESS NOTES
CC:  Bilateral knee pain    Patient is here for follow up of right knee pain/osteoarthritis. Pt is requesting Synvisc injection #3.  PMFH reviewed per encounter record. Has failed other conservative modalities including NSAIDS, activity modification, weight loss.     PHYSICAL EXAMINATION:      General: The patient is alert and oriented x 3. Mood is pleasant.   Observation of ears, eyes and nose reveals no gross abnormalities. No   labored breathing observed.      No signs of infection or adverse reaction to the bilateral knee.  TTP over medial and lateral joint lines  AROM 0 to 130 degrees bilaterally  Ligamentously stable  Negative meniscal testing    Assessment:  Osteoarthritis of bilateral knees    Plan:    Bilateral knee Synvisc injections performed today. See procedure note for details.     Follow up in 3-6 months.       All questions were answered. Instructed patient to call with questions or concerns in the interim.

## 2025-07-30 ENCOUNTER — OFFICE VISIT (OUTPATIENT)
Dept: SPORTS MEDICINE | Facility: CLINIC | Age: 80
End: 2025-07-30
Payer: MEDICARE

## 2025-07-30 VITALS
HEART RATE: 86 BPM | HEIGHT: 64 IN | BODY MASS INDEX: 20.7 KG/M2 | SYSTOLIC BLOOD PRESSURE: 116 MMHG | DIASTOLIC BLOOD PRESSURE: 76 MMHG

## 2025-07-30 DIAGNOSIS — M25.511 CHRONIC RIGHT SHOULDER PAIN: Primary | ICD-10-CM

## 2025-07-30 DIAGNOSIS — G89.29 CHRONIC RIGHT SHOULDER PAIN: Primary | ICD-10-CM

## 2025-07-30 DIAGNOSIS — M75.41 SHOULDER IMPINGEMENT SYNDROME, RIGHT: ICD-10-CM

## 2025-07-30 DIAGNOSIS — M75.21 BICEPS TENDINITIS OF RIGHT SHOULDER: ICD-10-CM

## 2025-07-30 PROCEDURE — 99999 PR PBB SHADOW E&M-EST. PATIENT-LVL III: CPT | Mod: PBBFAC,,, | Performed by: PHYSICIAN ASSISTANT

## 2025-07-30 RX ORDER — TRIAMCINOLONE ACETONIDE 40 MG/ML
40 INJECTION, SUSPENSION INTRA-ARTICULAR; INTRAMUSCULAR
Status: DISCONTINUED | OUTPATIENT
Start: 2025-07-30 | End: 2025-07-30 | Stop reason: HOSPADM

## 2025-07-30 RX ORDER — DICLOFENAC SODIUM 10 MG/G
2 GEL TOPICAL 4 TIMES DAILY
Qty: 450 G | Refills: 2 | Status: SHIPPED | OUTPATIENT
Start: 2025-07-30

## 2025-07-30 RX ADMIN — TRIAMCINOLONE ACETONIDE 40 MG: 40 INJECTION, SUSPENSION INTRA-ARTICULAR; INTRAMUSCULAR at 11:07

## 2025-07-30 NOTE — PROGRESS NOTES
CC: RIGHT shoulder pain    Patient is an 80-year-old female who presents today for evaluation of right shoulder pain.  Accompanied today by her son.  Patient reports she has been having pain in her right shoulder for several months.  She denies any specific mechanism of injury or trauma to attribute to this.  She feels that this is primarily related to overuse with activities at home such as cooking, cleaning, etc. she localizes the pain to the anterior aspect of the right shoulder as well as the trapezius musculature.  She denies any associated numbness/tingling of the right upper extremity.  She does feel some subjective weakness of the right shoulder with repetitive use.  No prior injuries or surgeries of the right shoulder.  Treatment at home thus far has included topical analgesics with little relief.    Is affecting ADLs.  Pain is 4/10 at it's worst.      Past Medical History:   Diagnosis Date    H. pylori infection     Hyperlipidemia     Hypertension     Unspecified osteoarthritis, unspecified site        Past Surgical History:   Procedure Laterality Date    ESOPHAGOGASTRODUODENOSCOPY N/A 11/23/2022    Procedure: EGD (ESOPHAGOGASTRODUODENOSCOPY);  Surgeon: José Becerra MD;  Location: [facility];  Service: Endoscopy;  Laterality: N/A;    UPPER GASTROINTESTINAL ENDOSCOPY  11/23/2022       Family History   Problem Relation Name Age of Onset    Breast cancer Neg Hx      Colon cancer Neg Hx      Ovarian cancer Neg Hx         Current Medications[1]    Review of patient's allergies indicates:  No Known Allergies       REVIEW OF SYSTEMS:  Constitution: Negative. Negative for chills, fever and night sweats.   HENT: Negative for congestion and headaches.    Eyes: Negative for blurred vision, left vision loss and right vision loss.   Cardiovascular: Negative for chest pain and syncope.   Respiratory: Negative for cough and shortness of breath.    Endocrine: Negative for polydipsia, polyphagia and polyuria.  "  Hematologic/Lymphatic: Negative for bleeding problem. Does not bruise/bleed easily.   Skin: Negative for dry skin, itching and rash.   Musculoskeletal: Negative for falls.  Positive for right shoulder pain and muscle weakness.   Gastrointestinal: Negative for abdominal pain and bowel incontinence.   Genitourinary: Negative for bladder incontinence and nocturia.   Neurological: Negative for disturbances in coordination, loss of balance and seizures.   Psychiatric/Behavioral: Negative for depression. The patient does not have insomnia.    Allergic/Immunologic: Negative for hives and persistent infections.      PHYSICAL EXAMINATION:  Vitals:  /76   Pulse 86   Ht 5' 4" (1.626 m)   LMP  (LMP Unknown)   BMI 20.70 kg/m²    General: The patient is alert and oriented x 3.  Mood is pleasant.  Observation of ears, eyes and nose reveal no gross abnormalities.  No labored breathing observed.  Gait is coordinated. Patient can toe walk and heel walk without difficulty.      RIGHT SHOULDER / UPPER EXTREMITY EXAM    OBSERVATION:     Swelling  none  Deformity  none   Discoloration  none   Scapular winging none   Scars   none  Atrophy  none    TENDERNESS / CREPITUS (T/C):          T/C      T/C   Clavicle   -/-  SUPRAspinatus    -/-     AC Jt.    -/-  INFRAspinatus  -/-    SC Jt.    -/-  Deltoid    -/-      G. Tuberosity  -/-  LH BICEP groove  +/-   Acromion:  -/-  Midline Neck   -/-     Scapular Spine -/-  Trapezium   +/-   SMA Scapula  -/-  GH jt. line - post  -/-     Scapulothoracic  -/-         ROM: (* = with pain)  Left shoulder   Right shoulder        AROM (PROM)   AROM (PROM)   FE    170° (175°)     160°* (170°*)     ER at 0°    60°  (65°)    50°*  (50°*)   ER at 90° ABD  70°  (70°)    60°*  (70°*)   IR at 90°  ABD   60  (60°)     60*  (60°*)      IR (spine level)   T10     L2*    STRENGTH: (* = with pain) Left shoulder   Right " shoulder   SCAPTION   5/5    4+/5*    IR    5/5    4/5*   ER    5/5    4/5*   BICEPS   5/5    5/5   Deltoid    5/5    5/5     SIGNS:  Painful side       NEER   +    ODOMOS  +    APARICIO   +    SPEEDS  +     DROP ARM   -   BELLY PRESS neg   Superior escape none    LIFT-OFF  neg   X-Body ADD    neg    MOVING VALGUS neg        STABILITY TESTING    Left shoulder   Right shoulder    Translation     Anterior  up face     up face    Posterior  up face    up face    Sulcus   < 10mm    < 10 mm     Signs   Apprehension   neg      neg       Relocation   no change     no change      Jerk test  neg     neg    EXTREMITY NEURO-VASCULAR EXAM:    Sensation grossly intact to light touch all dermatomal regions.    DTR 2+ Biceps, Triceps, BR and Negative Mishas sign   Grossly intact motor function at Elbow, Wrist and Hand   Distal pulses radial and ulnar 2+, brisk cap refill, symmetric.      NECK:  Painless FROM and spinous processes non-tender. Negative Spurlings sign.      OTHER FINDINGS:  + scapular dyskinesia    XRAYS right shoulder (6/11/2025):  Xrays including AP, Outlet and Axillary Lateral of shoulder are ordered / images reviewed by me:  No acute fracture or dislocation.  Mild glenohumeral and moderate acromioclavicular joint osteoarthritis noted.  Soft tissues appear normal.          ASSESSMENT:     1. Chronic right shoulder pain  diclofenac sodium (VOLTAREN) 1 % Gel      2. Biceps tendinitis of right shoulder        3. Shoulder impingement syndrome, right            PLAN:      I made the decision to obtain old records of the patient including previous notes and imaging. New imaging was ordered today of the extremity or extremities evaluated. I independently reviewed and interpreted the radiographs and/or MRIs today as well as prior imaging, if available.    We discussed at length different treatment options including conservative vs surgical management. These include anti-inflammatories, acetaminophen, rest, ice,  heat, formal physical therapy including strengthening and stretching exercises, home exercise programs, dry needling, corticosteroid injections, and finally surgical intervention.      Recommend a trial of conservative treatment at this time.  She would like to try a subacromial corticosteroid injection to help with pain.  See procedure note for details.  Prescription for Voltaren 1% gel to apply topically 3-4 times daily as needed for pain provided today.  Offered a referral to physical therapy, however they declined at this time.    HEP 33079 - I instructed and demonstrated a rotator cuff/periscapular HEP. The patient then demonstrated understanding of exercises and proper technique. This program was performed for 10 minutes.     Follow up as needed.       All questions were answered, patient will contact us for questions or concerns in the interim.      Medical Dictation software was used during the dictation of portions or the entirety of this medical record.  Phonetic or grammatic errors may exist due to the use of this software. For clarification, refer to the author of the document.               [1]   Current Outpatient Medications:     acetaminophen (TYLENOL) 500 MG tablet, Take 1 tablet (500 mg total) by mouth every 6 (six) hours as needed for Pain. Se can, dianne 6 tieng uong 1 gemini cho xaveir [facility], Disp: 60 tablet, Rfl: 0    alendronate (FOSAMAX) 70 MG tablet, Take 1 tablet (70 mg total) by mouth every 7 days. Julio luevano, Disp: 12 tablet, Rfl: 3    apixaban (ELIQUIS) 5 mg Tab, Take 1 tablet (5 mg total) by mouth 2 (two) times daily. Julio vega, Disp: 180 tablet, Rfl: 3    azelastine (OPTIVAR) 0.05 % ophthalmic solution, Place 1 drop into both eyes 2 (two) times daily., Disp: 6 mL, Rfl: 0    diclofenac sodium (VOLTAREN) 1 % Gel, Apply 2 g topically 4 (four) times daily., Disp: 450 g, Rfl: 2    docusate sodium (COLACE) 100 MG capsule, Take 100 mg by mouth 2 (two) times daily. (Patient not taking: Reported on  6/18/2025), Disp: , Rfl:     ergocalciferol (ERGOCALCIFEROL) 50,000 unit Cap, Take 1 capsule (50,000 Units total) by mouth every 7 days. Dajuan luevano, Disp: 12 capsule, Rfl: 3    esomeprazole (NEXIUM) 40 MG capsule, Take 1 capsule (40 mg total) by mouth before breakfast. Cinthia melissa barker, Disp: 90 capsule, Rfl: 3    levocetirizine (XYZAL) 5 MG tablet, TAKE 1 TABLET BY MOUTH IN THE EVENING .  ALLERGY/DI  ZARA. (Patient not taking: Reported on 7/23/2025), Disp: 90 tablet, Rfl: 3    metoprolol succinate (TOPROL-XL) 25 MG 24 hr tablet, Take 1 tablet (25 mg total) by mouth once daily. [facility] fady (lizzy vega), Disp: 90 tablet, Rfl: 3    traZODone (DESYREL) 50 MG tablet, Take 1 tablet (50 mg total) by mouth every evening. mayi marco antonio, Disp: 90 tablet, Rfl: 1

## 2025-07-30 NOTE — PROCEDURES
Large Joint Aspiration/Injection: R subacromial bursa    Date/Time: 7/30/2025 11:15 AM    Performed by: Jim Rousseau PA-C  Authorized by: Jim Rousseau PA-C    Site marked: the procedure site was marked    Timeout: prior to procedure the correct patient, procedure, and site was verified    Prep: patient was prepped and draped in usual sterile fashion    Local anesthesia used?: No      Details:  Needle Size:  22 G  Ultrasonic Guidance for needle placement?: No    Approach:  Posterior  Location:  Shoulder  Site:  R subacromial bursa  Medications:  40 mg triamcinolone acetonide 40 mg/mL  Patient tolerance:  Patient tolerated the procedure well with no immediate complications    Injection Procedure  A time out was performed, including verification of patient ID, procedure, site and side, availability of information and equipment, review of safety issues, and agreement with consent, the procedure site was marked.    After time out was performed, the patient was prepped aseptically with povidone-iodine swabsticks. A diagnostic and therapeutic injection of 1:3cc Kenalog/Marcaine was given under sterile technique using a 22g x 1.5 needle from the Posterior  aspect of the right Subacromial in the sitting position.      Louis Bronson had no adverse reactions to the medication. Pain decreased. She was instructed to apply ice to the joint for 20 minutes and avoid strenuous activities for 24-36 hours following the injection. She was warned of possible blood sugar and/or blood pressure changes during that time. Following that time, she can resume regular activities.    She was reminded to call the clinic immediately for any adverse side effects as explained in clinic today.